# Patient Record
Sex: MALE | Race: WHITE | NOT HISPANIC OR LATINO | Employment: OTHER | URBAN - METROPOLITAN AREA
[De-identification: names, ages, dates, MRNs, and addresses within clinical notes are randomized per-mention and may not be internally consistent; named-entity substitution may affect disease eponyms.]

---

## 2017-10-31 ENCOUNTER — GENERIC CONVERSION - ENCOUNTER (OUTPATIENT)
Dept: OTHER | Facility: OTHER | Age: 77
End: 2017-10-31

## 2017-11-01 ENCOUNTER — APPOINTMENT (OUTPATIENT)
Dept: PHYSICAL THERAPY | Facility: REHABILITATION | Age: 77
End: 2017-11-01
Payer: COMMERCIAL

## 2017-11-01 PROCEDURE — 97110 THERAPEUTIC EXERCISES: CPT

## 2017-11-01 PROCEDURE — 97161 PT EVAL LOW COMPLEX 20 MIN: CPT

## 2017-11-01 PROCEDURE — 97140 MANUAL THERAPY 1/> REGIONS: CPT

## 2017-11-01 PROCEDURE — G8991 OTHER PT/OT GOAL STATUS: HCPCS

## 2017-11-01 PROCEDURE — G8990 OTHER PT/OT CURRENT STATUS: HCPCS

## 2017-11-07 ENCOUNTER — APPOINTMENT (OUTPATIENT)
Dept: PHYSICAL THERAPY | Facility: REHABILITATION | Age: 77
End: 2017-11-07
Payer: COMMERCIAL

## 2017-11-07 PROCEDURE — 97112 NEUROMUSCULAR REEDUCATION: CPT

## 2017-11-07 PROCEDURE — 97110 THERAPEUTIC EXERCISES: CPT

## 2017-11-08 ENCOUNTER — APPOINTMENT (OUTPATIENT)
Dept: PHYSICAL THERAPY | Facility: REHABILITATION | Age: 77
End: 2017-11-08
Payer: COMMERCIAL

## 2017-11-13 ENCOUNTER — APPOINTMENT (OUTPATIENT)
Dept: PHYSICAL THERAPY | Facility: REHABILITATION | Age: 77
End: 2017-11-13
Payer: COMMERCIAL

## 2017-11-13 PROCEDURE — 97110 THERAPEUTIC EXERCISES: CPT

## 2017-11-13 PROCEDURE — 97112 NEUROMUSCULAR REEDUCATION: CPT

## 2017-11-16 ENCOUNTER — APPOINTMENT (OUTPATIENT)
Dept: PHYSICAL THERAPY | Facility: REHABILITATION | Age: 77
End: 2017-11-16
Payer: COMMERCIAL

## 2017-11-16 PROCEDURE — 97140 MANUAL THERAPY 1/> REGIONS: CPT

## 2017-11-16 PROCEDURE — 97112 NEUROMUSCULAR REEDUCATION: CPT

## 2017-11-20 ENCOUNTER — APPOINTMENT (OUTPATIENT)
Dept: PHYSICAL THERAPY | Facility: REHABILITATION | Age: 77
End: 2017-11-20
Payer: COMMERCIAL

## 2017-11-20 PROCEDURE — 97110 THERAPEUTIC EXERCISES: CPT

## 2017-11-20 PROCEDURE — 97112 NEUROMUSCULAR REEDUCATION: CPT

## 2017-11-22 ENCOUNTER — APPOINTMENT (OUTPATIENT)
Dept: PHYSICAL THERAPY | Facility: REHABILITATION | Age: 77
End: 2017-11-22
Payer: COMMERCIAL

## 2017-11-22 PROCEDURE — 97112 NEUROMUSCULAR REEDUCATION: CPT

## 2017-11-22 PROCEDURE — 97110 THERAPEUTIC EXERCISES: CPT

## 2017-11-22 PROCEDURE — 97140 MANUAL THERAPY 1/> REGIONS: CPT

## 2017-11-28 ENCOUNTER — APPOINTMENT (OUTPATIENT)
Dept: PHYSICAL THERAPY | Facility: REHABILITATION | Age: 77
End: 2017-11-28
Payer: COMMERCIAL

## 2017-11-30 ENCOUNTER — APPOINTMENT (OUTPATIENT)
Dept: PHYSICAL THERAPY | Facility: REHABILITATION | Age: 77
End: 2017-11-30
Payer: COMMERCIAL

## 2017-11-30 PROCEDURE — 97112 NEUROMUSCULAR REEDUCATION: CPT

## 2017-11-30 PROCEDURE — 97140 MANUAL THERAPY 1/> REGIONS: CPT

## 2017-11-30 PROCEDURE — 97110 THERAPEUTIC EXERCISES: CPT

## 2017-12-07 ENCOUNTER — APPOINTMENT (OUTPATIENT)
Dept: PHYSICAL THERAPY | Facility: REHABILITATION | Age: 77
End: 2017-12-07
Payer: COMMERCIAL

## 2017-12-12 ENCOUNTER — APPOINTMENT (OUTPATIENT)
Dept: PHYSICAL THERAPY | Facility: REHABILITATION | Age: 77
End: 2017-12-12
Payer: COMMERCIAL

## 2017-12-12 PROCEDURE — 97140 MANUAL THERAPY 1/> REGIONS: CPT

## 2017-12-12 PROCEDURE — 97112 NEUROMUSCULAR REEDUCATION: CPT

## 2017-12-14 ENCOUNTER — APPOINTMENT (OUTPATIENT)
Dept: PHYSICAL THERAPY | Facility: REHABILITATION | Age: 77
End: 2017-12-14
Payer: COMMERCIAL

## 2017-12-14 PROCEDURE — 97112 NEUROMUSCULAR REEDUCATION: CPT

## 2017-12-14 PROCEDURE — 97110 THERAPEUTIC EXERCISES: CPT

## 2017-12-19 ENCOUNTER — APPOINTMENT (OUTPATIENT)
Dept: PHYSICAL THERAPY | Facility: REHABILITATION | Age: 77
End: 2017-12-19
Payer: COMMERCIAL

## 2017-12-19 PROCEDURE — 97110 THERAPEUTIC EXERCISES: CPT

## 2017-12-19 PROCEDURE — 97112 NEUROMUSCULAR REEDUCATION: CPT

## 2017-12-19 PROCEDURE — G8992 OTHER PT/OT  D/C STATUS: HCPCS

## 2017-12-19 PROCEDURE — G8990 OTHER PT/OT CURRENT STATUS: HCPCS

## 2017-12-19 PROCEDURE — G8991 OTHER PT/OT GOAL STATUS: HCPCS

## 2017-12-21 ENCOUNTER — APPOINTMENT (OUTPATIENT)
Dept: PHYSICAL THERAPY | Facility: REHABILITATION | Age: 77
End: 2017-12-21
Payer: COMMERCIAL

## 2018-01-22 VITALS
SYSTOLIC BLOOD PRESSURE: 164 MMHG | HEART RATE: 69 BPM | WEIGHT: 224.5 LBS | HEIGHT: 68 IN | DIASTOLIC BLOOD PRESSURE: 85 MMHG | BODY MASS INDEX: 34.02 KG/M2

## 2023-02-21 ENCOUNTER — OFFICE VISIT (OUTPATIENT)
Dept: PODIATRY | Facility: CLINIC | Age: 83
End: 2023-02-21

## 2023-02-21 VITALS
HEIGHT: 67 IN | WEIGHT: 217 LBS | SYSTOLIC BLOOD PRESSURE: 130 MMHG | BODY MASS INDEX: 34.06 KG/M2 | DIASTOLIC BLOOD PRESSURE: 78 MMHG | HEART RATE: 67 BPM

## 2023-02-21 DIAGNOSIS — M20.42 HAMMER TOE OF LEFT FOOT: ICD-10-CM

## 2023-02-21 DIAGNOSIS — M20.12 HALLUX VALGUS OF LEFT FOOT: Primary | ICD-10-CM

## 2023-02-21 NOTE — PROGRESS NOTES
Assessment/Plan:    I personally reviewed x-rays of the left foot  Severe hallux valgus deformity noted  Hammertoe deformity left second toe present  Metatarsal adductus noted  Degenerative changes of the midfoot present  Reviewed x-rays with patient and wife  Explained that surgery is not recommended due to his age and Parkinson's  Dispensed Silipos pads for the second toe  Recommended cutting toenails and old pairs of shoes both with the bunion hammertoe  Trimmed elongated toenails  Reappoint as needed    No problem-specific Assessment & Plan notes found for this encounter  Diagnoses and all orders for this visit:    Hallux valgus of left foot  -     X-ray foot left 3+ views; Future    Hammer toe of left foot  -     X-ray foot left 3+ views; Future          Subjective:      Patient ID: Petrona Martin is a 78 y o  male  HPI     Patient, an 78-year-old male presents with his wife  Patient complains of pain due to a severe bunion deformity affecting the left foot and a hammertoe deformity affecting the left second toe  Patient has a soft corn on the left second toe due to the bunion deformity  Patient having difficulty finding shoes to wear that are comfortable  Patient notes that he is suffering with Parkinson's syndrome and has poor balance  Wife mentions that he has early stage dementia  The following portions of the patient's history were reviewed and updated as appropriate: allergies, current medications, past family history, past medical history, past social history, past surgical history and problem list     Review of Systems   Musculoskeletal: Positive for gait problem  Neurological: Positive for tremors          Parkinson's   Psychiatric/Behavioral:        Dementia             Objective:      /78 (BP Location: Left arm, Patient Position: Sitting, Cuff Size: Adult)   Pulse 67   Ht 5' 7" (1 702 m)   Wt 98 4 kg (217 lb)   BMI 33 99 kg/m²          Physical Exam  Constitutional:       Appearance: Normal appearance  Cardiovascular:      Pulses: Normal pulses  Musculoskeletal:         General: Deformity present  Comments: Severe hallux valgus deformity left foot  Left second toe is being irritated by hallux which is laterally deviated  Hammertoe deformity left second toe  PIPJ is sensitive  Skin:     Findings: Lesion present  Comments: Soft corn medial aspect left second toe  Neurological:      General: No focal deficit present  Mental Status: He is oriented to person, place, and time  Statement Selected

## 2023-08-08 ENCOUNTER — ESTABLISHED COMPREHENSIVE EXAM (OUTPATIENT)
Dept: URBAN - METROPOLITAN AREA CLINIC 6 | Facility: CLINIC | Age: 83
End: 2023-08-08

## 2023-08-08 DIAGNOSIS — Z96.1: ICD-10-CM

## 2023-08-08 DIAGNOSIS — H04.123: ICD-10-CM

## 2023-08-08 PROCEDURE — 92015 DETERMINE REFRACTIVE STATE: CPT

## 2023-08-08 PROCEDURE — 92014 COMPRE OPH EXAM EST PT 1/>: CPT

## 2023-08-08 ASSESSMENT — KERATOMETRY
OS_AXISANGLE2_DEGREES: 175
OD_AXISANGLE2_DEGREES: 161
OD_AXISANGLE_DEGREES: 71
OS_K1POWER_DIOPTERS: 44.25
OS_K2POWER_DIOPTERS: 45.50
OD_K1POWER_DIOPTERS: 44.00
OD_K2POWER_DIOPTERS: 45.00
OS_AXISANGLE_DEGREES: 85

## 2023-08-08 ASSESSMENT — TONOMETRY
OS_IOP_MMHG: 12
OD_IOP_MMHG: 14

## 2023-08-08 ASSESSMENT — VISUAL ACUITY
OD_CC: 20/40-2
OS_CC: 20/30-1
OU_CC: J1

## 2024-03-25 ENCOUNTER — EVALUATION (OUTPATIENT)
Dept: PHYSICAL THERAPY | Facility: CLINIC | Age: 84
End: 2024-03-25
Payer: COMMERCIAL

## 2024-03-25 DIAGNOSIS — R26.89 BALANCE DISORDER: Primary | ICD-10-CM

## 2024-03-25 PROCEDURE — 97112 NEUROMUSCULAR REEDUCATION: CPT | Performed by: PHYSICAL THERAPIST

## 2024-03-25 PROCEDURE — 97163 PT EVAL HIGH COMPLEX 45 MIN: CPT | Performed by: PHYSICAL THERAPIST

## 2024-03-25 NOTE — PROGRESS NOTES
PT Evaluation     Today's date: 3/25/2024  Patient name: Robi Bello  : 1940  MRN: [id number]  Referring provider: No ref. provider found  Dx:   Encounter Diagnosis     ICD-10-CM    1. Balance disorder  R26.89                         Identified Functional Component Movements (5)  1. Sit to Stand  2.  3.  4.  5.    Identified Hierarchies (1-3)  1.  2.  3.                  Subjective Evaluation    History of Present Illness  Mechanism of injury: Pt diagnosed with PD 5 years ago. Experienced recent fall in 3/16/24 at home and visited ER. Pt tripped over wire and hit head. Pt originally had headache, but pain has subsided since the fall. Imaging was performed and was negative. Pt has received rehab in the last 6 months at [facility] as well as home health physical therapy with [facility]. Spouse reports pt fell 3x in one day and 8x in the same week in January and has been off balance since. Spouse recently had a hip replacement and is unable to help him as much as possible.       Memory           Not a recurrent problem   Quality of life: good    Patient Goals  Patient goals for therapy: increased strength, independence with ADLs/IADLs, return to sport/leisure activities, improved balance and increased motion  Patient goal: Walk without assistive device, drive if possible  Pain  No pain reported    Social Support  Steps to enter house: yes (2 stairs to enter house. No railing.)  2  Stairs in house: yes (Has full staircase, but no longer goes up to second floor.)   Lives in: multiple-level home (Walk in shower installed, has raised toilet seat, has bedroom on main floor)  Lives with: spouse    Employment status: not working (Retired from [employer])  Exercise history: 3 years ago was golfing, not very active at home      Diagnostic Tests  X-ray: normal  MRI studies: normal  CT scan: normal  Treatments  Previous treatment: medication  Discharged from (in last 30 days): home health care        Objective  Patient  Reported Functional Limitations: Walking without AD, stairs, navigating obstacles on the floor     Gait Assessment  03/25/24        6 Minute Walk Test (ft): Pre: HR 66  O2 99%  775 ft with RW  Post: HR 85 O2 98%        2 Minute Walk Test (ft): 225 ft with RW        Gait Speed (ft/s): 20 ft / 8 sec = 2.5 ft/s  0.76 m/s        TUG NT    TUG Score:   TUG Manual Score:   TUG Cognitive Score:        FGA (<22/30 increased fall risk) 14/30         Sit to Stand Assessment  5xSTS score (time): 14 sec   Use of B/L UE (pt did not stand up all the way)         Balance:         Modified Clinical Test of Sensory Interaction on Balance   NT    Firm surface:  EO  EC  FOAM surface  EO  EC Firm surface:  EO  EC  FOAM surface  EO  EC Firm surface:  EO  EC  FOAM surface  EO  EC Firm surface:  EO  EC  FOAM surface  EO  EC     Activities Specific Balance Confidence Scale (ABC) score: 17.5 %        Hand Function Assessment  Hole Peg Test Score: NT          Posture: rounded shoulders, forward head    Vision:  Smooth pursuits: Normal   Horizontal saccades: Normal, but slow speed   Vertical saccades: Normal, but slow speed     Coordination  Finger to Nose: dysmetric b/l,   Rapid forearm supination and pronation: normal at slow speed, unable at increased speed   Heel on Shin: difficulty on RLE, normal on LLE    Manual Muscle Testing - Hip Right  ROM Left ROM   Flexion 5  5      Manual Muscle Testing - Knee Right ROM Left ROM   Flexion 5  5    Extension 5  5      Manual Muscle Testing - Ankle Right ROM LEFT Left   Doriflexion 4  5    Plantarflexion 5  5      Gait Assessment:  Decreased b/l heel strike, Decreased b/l Step Height, Decreased b/l Step Length,    Decreased R Arm Swing, decreased Trunk ROT          Short Term Goal Expiration Date:(***)  Long Term Goal Expiration Date: ()  POC Expiration Date: (***)    Visit count: 1 of 10; PN due ***    POC expires Unit limit Auth Expiration date PT/OT/ST + Visit Limit?   *** N/A N/A BOMN                            Visit/Unit Tracking  AUTH Status:  Date 03/25             BOMN Used 1              Remaining                     Precautions History of falls,        Manuals                                        Neuro Re-Ed                                                                 Ther Ex                                                                        Ther Activity                        Gait Training                        Modalities

## 2024-03-25 NOTE — PROGRESS NOTES
PT Evaluation     Today's date: 3/25/2024  Patient name: Robi Bello  : 1940  MRN: [id number]  Referring provider: No ref. provider found  Dx:   Encounter Diagnosis     ICD-10-CM    1. Balance disorder  R26.89                      Assessment  Assessment details: Patient is a 84 y/o male who presents to outpatient PT with the diagnosis of Parkinson's Disease 5 years ago and a recent fall on 3/16/24. Patient demonstrates deficits in endurance, strength, balance, motor planning, and functional activities. Patient demonstrates co-morbidities of Meniere disease, migraines, and bilateral tremors. Upon evaluation, pt had decreased coordination bilaterally with finger to nose and alternating movement testing with increased difficulty on the R. Pt has strong LE strength according to MMT, but had decreased power and functional strength with 5xSTS testing. Pt is also at an increased fall risk due to his FGA score of 14/30 and gait speed of 0.76 m/s. In addition, pt has poor self-confidence associated with balance and is at an increased risk of falls due to his ABC score of 17.5 %. Pt and family were educated on all results of today's testing as well as importance of safety with ambulating with RW. Pt and family verbalized understanding. Pt would benefit from skilled PT isessions at 2 times per week for 8 weeks in order to maximize progress toward personal goals as established during the first week of therapy interventions.     Impairments: abnormal coordination, abnormal gait, abnormal movement, activity intolerance, impaired balance, impaired physical strength, lacks appropriate home exercise program, safety issue and poor posture   Barriers to therapy: Limited transportation, history of migraines and Meniere disease  Understanding of Dx/Px/POC: good   Prognosis: good    Goals  STG:  Patient will improve 6MWT by 200 ft with RW demonstrating significant improvements in endurance  w/in 4 weeks  Patient will be  able to perform STS without use of UE within 4 weeks to demonstrate improvements in functional strength and power.  Patient will be able to walk household distances (100ft or more) with SPC and supervision within 4 weeks to address his goal of walking more independently.   Patient will be independent with HEP within 4 weeks  LTG:  Patient will improve 6MWT by 400 ft demonstrating significant improvements in endurance  w/in 8 weeks  Patient will demonstrated improved LE strength and endurance by improved 5xSTS to 12s or less without the use of UE within 8 weeks  Patient will improve TUG to 12s or less indicating they are no longer at risk for increased falls 8 weeks  Patient will demonstrates significant improvements in dynamic balance by improving FGA score by 6 points or more within 8 weeks  Patient will improve gait speed to 1.0 m/s indicating they are no longer at higher risk for falls within 8 weeks    Plan  Patient would benefit from: skilled physical therapy  Referral necessary: No  Planned therapy interventions: home exercise program, graded motor, graded exercise, graded activity, gait training, flexibility, functional ROM exercises, community reintegration, ADL training, balance, motor coordination training, neuromuscular re-education, patient education, postural training, sensory integrative techniques, strengthening, stretching, therapeutic activities, therapeutic exercise, therapeutic training and transfer training  Frequency: 2x week  Duration in weeks: 8  Plan of Care beginning date: 3/25/2024  Plan of Care expiration date: 5/20/2024  Treatment plan discussed with: patient and family    Subjective Evaluation    History of Present Illness  Mechanism of injury: Pt diagnosed with PD 5 years ago. Experienced recent fall in 3/16/24 at home and visited ER. Pt tripped over wire and hit head. Pt originally had headache, but pain has subsided since the fall. Imaging was performed and was negative. Pt has  received rehab in the last 6 months at [facility] as well as home health physical therapy with [facility]. Spouse reports pt fell 3x in one day and 8x in the same week in January and has been off balance since. Spouse recently had a hip replacement and is unable to help him as much as possible. Pt reports memory is his most difficult impairment as of right now.        Not a recurrent problem   Quality of life: good    Patient Goals  Patient goals for therapy: increased strength, independence with ADLs/IADLs, return to sport/leisure activities, improved balance and increased motion  Patient goal: Walk without assistive device, drive if possible  Pain  No pain reported    Social Support  Steps to enter house: yes (2 stairs to enter house. No railing.)  2  Stairs in house: yes (Has full staircase, but no longer goes up to second floor.)   Lives in: multiple-level home (Walk in shower installed, has raised toilet seat, has bedroom on main floor)  Lives with: spouse    Employment status: not working (Retired from [employer])  Exercise history: 3 years ago was golfing, not very active at home    Diagnostic Tests  X-ray: normal  MRI studies: normal  CT scan: normal  Treatments  Previous treatment: medication  Discharged from (in last 30 days): home health care    Objective  Patient Reported Functional Limitations: Walking without AD, stairs, navigating obstacles on the floor     Gait Assessment  03/25/24        6 Minute Walk Test (ft): Pre: HR 66  O2 99%  775 ft with RW  Post: HR 85 O2 98%        2 Minute Walk Test (ft): 225 ft with RW        Gait Speed (ft/s): 20 ft / 8 sec = 2.5 ft/s  0.76 m/s        TUG NT    TUG Score:   TUG Manual Score:   TUG Cognitive Score:        FGA (<22/30 increased fall risk) 14/30         Sit to Stand Assessment  5xSTS score (time): 14 sec   Use of B/L UE (pt did not stand up all the way)         Balance:         Modified Clinical Test of Sensory Interaction on Balance   NT    Firm  surface:  EO  EC  FOAM surface  EO  EC Firm surface:  EO  EC  FOAM surface  EO  EC Firm surface:  EO  EC  FOAM surface  EO  EC Firm surface:  EO  EC  FOAM surface  EO  EC     Activities Specific Balance Confidence Scale (ABC) score: 17.5 %        Hand Function Assessment  Hole Peg Test Score: NT          Posture: rounded shoulders, forward head    Vision:  Smooth pursuits: Normal   Horizontal saccades: Normal, but slow speed   Vertical saccades: Normal, but slow speed     Coordination  Finger to Nose: dysmetric b/l,   Rapid forearm supination and pronation: normal at slow speed, unable at increased speed   Heel on Shin: difficulty on RLE, normal on LLE    Manual Muscle Testing - Hip Right  ROM Left ROM   Flexion 5  5      Manual Muscle Testing - Knee Right ROM Left ROM   Flexion 5  5    Extension 5  5      Manual Muscle Testing - Ankle Right ROM LEFT Left   Doriflexion 4  5    Plantarflexion 5  5      Gait Assessment:  Decreased b/l heel strike, Decreased b/l Step Height, Decreased b/l Step Length,    Decreased R Arm Swing, decreased Trunk ROT          Short Term Goal Expiration Date:(04/22/2024)  Long Term Goal Expiration Date: (05/20/2024)  POC Expiration Date: (05/20/2024)    Visit count: 1 of 10; PN due 04/22/2024    POC expires Unit limit Auth Expiration date PT/OT/ST + Visit Limit?   05/20/2024 N/A N/A BOMN                           Visit/Unit Tracking  AUTH Status:  Date 03/25             BOMN Used 1              Remaining                     Precautions History of falls,        Manuals                                        Neuro Re-Ed  Pt and family educated on results of exam findings. Educated on safety with RW.                                                               Ther Ex                                                                        Ther Activity                        Gait Training                        Modalities

## 2024-03-27 ENCOUNTER — APPOINTMENT (OUTPATIENT)
Dept: PHYSICAL THERAPY | Facility: CLINIC | Age: 84
End: 2024-03-27
Payer: COMMERCIAL

## 2024-03-28 ENCOUNTER — OFFICE VISIT (OUTPATIENT)
Dept: PHYSICAL THERAPY | Facility: CLINIC | Age: 84
End: 2024-03-28
Payer: COMMERCIAL

## 2024-03-28 DIAGNOSIS — G20.A1 PARKINSON'S DISEASE, UNSPECIFIED WHETHER DYSKINESIA PRESENT, UNSPECIFIED WHETHER MANIFESTATIONS FLUCTUATE: Primary | ICD-10-CM

## 2024-03-28 DIAGNOSIS — R26.89 BALANCE DISORDER: ICD-10-CM

## 2024-03-28 PROCEDURE — 97112 NEUROMUSCULAR REEDUCATION: CPT | Performed by: PHYSICAL THERAPIST

## 2024-03-28 PROCEDURE — 97150 GROUP THERAPEUTIC PROCEDURES: CPT | Performed by: PHYSICAL THERAPIST

## 2024-03-28 NOTE — PROGRESS NOTES
Daily Note     Today's date: 3/28/2024  Patient name: Robi Bello  : 1940  MRN: [id number]  Referring provider: Joaquim Dickerson MD  Dx:   Encounter Diagnosis     ICD-10-CM    1. Parkinson's disease, unspecified whether dyskinesia present, unspecified whether manifestations fluctuate  G20.A1       2. Balance disorder  R26.89           Start Time: 915  Stop Time:   Total time in clinic (min): 45 minutes    Subjective: Pt reports difficulty with memory.       Objective: See treatment diary below    TUG TUG Score: 26.75 sec (no AD)   TUG Cognitive Score: 28.41 sec (no AD), only able to perform cognitive tasks for first 10 sec      Modified Clinical Test of Sensory Interaction on Balance   Firm surface:  EO 30 sec   EC 30 sec   FOAM surface  EO 30 sec   EC 3 sec, 11 sec, 21 sec      Group ([id number]): 15 minutes   1:1 treatment ([id number]): 30 minutes     Assessment:   Pt tolerated treatment well this session. Upon further testing, pt remains at an increased fall risk at this time due to his TUG score. Performance decreased on TUG with addition of cognitive tasks due to memory deficits. Pt was able to maintain static balance on firm surfaces and on foam with EO. However, pt had significant difficulty maintaining balance on foam with EC which is consistent with decreased vestibular cueing for balance. Pt was challenged with dynamic balance interventions which he tolerated well. Pt was able to increase HR to up to 60% of Hrmax for good promotion of cardiovascular endurance and neuroplasticity. Pt had difficulty with reciprocal arm and leg movements due to decreased coordination. In addition, pt had significant difficulty with lateral stepping requiring max verbal cues for technique and UE support. Pt would benefit from further skilled PT sessions to address deficits in endurance, balance, strength, and overall safety needed to achieve his personal and functional goals. Pt would also benefit from  "potential speech therapy evaluation to address memory deficits. Plan to discuss with patient and spouse next session.       Plan: Continue per plan of care.      Short Term Goal Expiration Date:(04/22/2024)  Long Term Goal Expiration Date: (05/20/2024)  POC Expiration Date: (05/20/2024)    Visit count: 2 of 10; PN due 04/22/2024    POC expires Unit limit Auth Expiration date PT/OT/ST + Visit Limit?   05/20/2024 N/A N/A BOMN                           Visit/Unit Tracking  AUTH Status:  Date 03/25 03/28            BOMN Used 1 2             Remaining                   Manuals 03/25 03/28                                      Neuro Re-Ed  Pt and family educated on results of exam findings. Educated on safety with RW. Pt education on results of test findings.       Hurdles   Solo   1/4 track   No UE support   6\" hurdles   Fwd x 3 laps   HR 80   O2 98%    Lat x 2 laps   (Max verbal cues to stay on task)   HR 77   O2 97%      HKM  Solo   1/4 track   Reciprocal arm swing     X 4 laps       Dynamic balance   Lateral stepping   Solo   1/4 track   X 2 laps       STS  B/L UE   2 x 10   Mod verbal cues for technique   HR 74   O2 97%                              Ther Ex                                                                        Ther Activity                        Gait Training                        Modalities                                "

## 2024-04-02 ENCOUNTER — OFFICE VISIT (OUTPATIENT)
Dept: PHYSICAL THERAPY | Facility: CLINIC | Age: 84
End: 2024-04-02
Payer: COMMERCIAL

## 2024-04-02 DIAGNOSIS — G20.A1 PARKINSON'S DISEASE, UNSPECIFIED WHETHER DYSKINESIA PRESENT, UNSPECIFIED WHETHER MANIFESTATIONS FLUCTUATE: Primary | ICD-10-CM

## 2024-04-02 DIAGNOSIS — R26.89 BALANCE DISORDER: ICD-10-CM

## 2024-04-02 PROCEDURE — 97112 NEUROMUSCULAR REEDUCATION: CPT | Performed by: PHYSICAL THERAPIST

## 2024-04-02 PROCEDURE — 97116 GAIT TRAINING THERAPY: CPT | Performed by: PHYSICAL THERAPIST

## 2024-04-02 NOTE — PROGRESS NOTES
Daily Note     Today's date: 2024  Patient name: Robi Bello  : 1940  MRN: [id number]  Referring provider: Joaquim Dickerson MD  Dx:   Encounter Diagnosis     ICD-10-CM    1. Parkinson's disease, unspecified whether dyskinesia present, unspecified whether manifestations fluctuate  G20.A1       2. Balance disorder  R26.89           Start Time: 1100  Stop Time: 1145  Total time in clinic (min): 45 minutes    Subjective: Overall feels good today. Reports occasional feelings of lightheadedness from supine -> sit transfers or sit -> stand transfers. Continues to complain about memory.       Objective: See treatment diary below    BP:   Seated: 168/84 mmHg L arm seated, (2 min after) 142/82 mmHg L arm seated   Standin/78 mmHg L arm standing       Assessment:   Pt tolerated the treatment fairly well this session. Pt was able to perform treadmill walking, but required max verbal cues for big steps to control festination and shuffling gait. Attempted using [employer] for visual cue for big steps, but performance was inconsistent. Pt responded better to verbal cueing. Following treadmill walking, pt felt lightheaded. An orthostatic vital assessment was performed. Pt's BP was increased in sitting, but was able to recover quickly. BP remained stable in standing which was not indicative of orthostatic hypotension at this time. Pt educated to stay hydrated and to take time with positional changes to improve symptoms of possible orthostatic hypotension due to potential side effects of medications. Pt verbalized understanding. Pt did well with dynamic balance interventions such as marching and stair negotiation, but had the most difficulty with backwards walking. Performance may have been impaired due to memory deficits and difficulty staying on task. Responded well to verbal and visual cues for technique. Pt would benefit from further skilled PT sessions to address deficits in endurance, strength, balance,  "and overall safety needed for ADLs and community ambulation.       Plan: Continue per plan of care.      Short Term Goal Expiration Date:(04/22/2024)  Long Term Goal Expiration Date: (05/20/2024)  POC Expiration Date: (05/20/2024)    Visit count: 3 of 10; PN due 04/22/2024    POC expires Unit limit Auth Expiration date PT/OT/ST + Visit Limit?   05/20/2024 N/A N/A BOMN                           Visit/Unit Tracking  AUTH Status:  Date 03/25 03/28 04/02           BOMN Used 1 2 3            Remaining                 uthorized (3/25/2024-12/31/2024)  Visits Requested Visits Authorized Visits Completed Visits Scheduled   [phone] 5       Manuals 03/25 03/28 04/02                                     Neuro Re-Ed  Pt and family educated on results of exam findings. Educated on safety with RW. Pt education on results of test findings.  Pt education on taking time getting up from supine lying due to symptoms of orthostatic hypotension.      Hurdles   Solo   1/4 track   No UE support   6\" hurdles   Fwd x 3 laps   HR 80   O2 98%    Lat x 2 laps   (Max verbal cues to stay on task)   HR 77   O2 97%      HKM  Solo   1/4 track   Reciprocal arm swing     X 4 laps  Solo   1/4 track   X 4 laps   HR 73  O2 99%    Visual and verbal cues to lift knee to walker     Dynamic balance   Lateral stepping   Solo   1/4 track   X 2 laps  Backwards walking   // bars   X 1 lap     Max verbal cues for technique      STS  B/L UE   2 x 10   Mod verbal cues for technique   HR 74   O2 97%      Stairs    Solo  Clinic staircase   Reciprocal pattern on ascent   Step-to pattern on descent   X 3 laps     Mod verbal cues for technique     Needed to remove glasses due to bifocals throwing balance off                      Ther Ex                                                                        Ther Activity                        Gait Training        Treadmill    Solo   1.2 mph   0% incline   10 minutes   HR 84  O2 99%    Physioball for visual cue for big " stepping   Max verbal cues for big steps              Modalities

## 2024-04-04 ENCOUNTER — OFFICE VISIT (OUTPATIENT)
Dept: PHYSICAL THERAPY | Facility: CLINIC | Age: 84
End: 2024-04-04
Payer: COMMERCIAL

## 2024-04-04 DIAGNOSIS — G20.A1 PARKINSON'S DISEASE, UNSPECIFIED WHETHER DYSKINESIA PRESENT, UNSPECIFIED WHETHER MANIFESTATIONS FLUCTUATE: Primary | ICD-10-CM

## 2024-04-04 DIAGNOSIS — R26.89 BALANCE DISORDER: ICD-10-CM

## 2024-04-04 PROCEDURE — 97112 NEUROMUSCULAR REEDUCATION: CPT | Performed by: PHYSICAL THERAPIST

## 2024-04-04 NOTE — PROGRESS NOTES
Daily Note     Today's date: 2024  Patient name: Robi Bello  : 1940  MRN: [id number]  Referring provider: Joaquim Dickerson MD  Dx:   Encounter Diagnosis     ICD-10-CM    1. Parkinson's disease, unspecified whether dyskinesia present, unspecified whether manifestations fluctuate  G20.A1       2. Balance disorder  R26.89           Start Time: 1100  Stop Time: 1145  Total time in clinic (min): 45 minutes    Subjective: Pt reports no new complaints at the start of the session. Pt shared that he enjoys golfing, but hasn't been able to do it in years because of his PD. At the end of the session, pt reported that he almost fell in the bathroom twice when getting up from the toilet.       Objective: See treatment diary below      Assessment:   Pt tolerated treatment well. Pt attempted the recumbent bike this session to promote cardiovascular endurance and neural priming, but had significant difficulty with maintaining feet on pedals requiring mod-max assistance for foot placement by therapist. Increased external cueing for big movements were utilized this session such as circles on the floor for increased step length and height of walker for marching. Pt responded well, but continues to require increased verbal cues for continued performance. Pt demonstrated improvements with STS this session and was encouraged to be loud with counting and raise arms overhead to encourage trunk extension and big movements. At the end of the session, pt needed to go to the bathroom. Pt asked for SPT to give him privacy and leave the restroom. SPT educated him on using grab bars to improve safety with toilet transfer prior to leaving. Pt then reported he almost fell twice while alone in the bathroom. Plan to ask wife about safety with toileting at home next session and plan to guard pt in bathroom during future sessions until he demonstrates safer transfers. Pt would benefit from further skilled PT sessions to address  "deficits in endurance, strength, balance, and overall safety needed for ADLs and community ambulation.     Plan: Continue per plan of care.      Short Term Goal Expiration Date:(04/22/2024)  Long Term Goal Expiration Date: (05/20/2024)  POC Expiration Date: (05/20/2024)    Visit count: 4 of 10; PN due 04/22/2024    POC expires Unit limit Auth Expiration date PT/OT/ST + Visit Limit?   05/20/2024 N/A N/A BOMN                           Visit/Unit Tracking  AUTH Status:  Date 03/25 03/28 04/02 04/04          BOMN Used 1 2 3 4           Remaining                 uthorized (3/25/2024-12/31/2024)  Visits Requested Visits Authorized Visits Completed Visits Scheduled   [phone] 5       Manuals 03/25 03/28 04/02 04/04                                    Neuro Re-Ed  Pt and family educated on results of exam findings. Educated on safety with RW. Pt education on results of test findings.  Pt education on taking time getting up from supine lying due to symptoms of orthostatic hypotension.  Pt education on safety with toilet transfers.     Pt education on benefits of big movements and loud voice to have a more \"normal\" movement pattern and voice projection.     Hurdles   Solo   1/4 track   No UE support   6\" hurdles   Fwd x 3 laps   HR 80   O2 98%    Lat x 2 laps   (Max verbal cues to stay on task)   HR 77   O2 97%      HKM  Solo   1/4 track   Reciprocal arm swing     X 4 laps  Solo   1/4 track   X 4 laps   HR 73  O2 99%    Visual and verbal cues to lift knee to walker Solo   1/4 track   X 4 laps   HR 88  O2 98%    Visual and verbal cues to lift knee to walker    Dynamic balance   Lateral stepping   Solo   1/4 track   X 2 laps  Backwards walking   // bars   X 1 lap     Max verbal cues for technique  Big stepping with [place] targets on floor for external cue  X 4 laps   Solo   1/4 track     STS  B/L UE   2 x 10   Mod verbal cues for technique   HR 74   O2 97%  B/L UE   2 x 10   W/ above head arm raise for high five   HR 82, 100  O2 " 98%, 99%    Emphasis on loud counting and big movements     Stairs    Solo  Clinic staircase   Reciprocal pattern on ascent   Step-to pattern on descent   X 3 laps     Mod verbal cues for technique     Needed to remove glasses due to bifocals throwing balance off      Boxing     Reciprocal punches (cross body to encourage trunk rotation)   Solo  2 x 2 minute rounds   HR 82   O2 98%    Mod verbal cues for technique             Ther Ex        Bike    Recumbent bike   L1 x 6 minutes     Mod-max A for foot placement on pedals   Verbal cues to maintain foot on pedals                                                             Ther Activity                        Gait Training        Treadmill    Solo   1.2 mph   0% incline   10 minutes   HR 84  O2 99%    Physioball for visual cue for big stepping   Max verbal cues for big steps              Modalities

## 2024-04-05 ENCOUNTER — APPOINTMENT (OUTPATIENT)
Dept: SPEECH THERAPY | Facility: CLINIC | Age: 84
End: 2024-04-05
Payer: COMMERCIAL

## 2024-04-05 ENCOUNTER — APPOINTMENT (OUTPATIENT)
Dept: PHYSICAL THERAPY | Facility: CLINIC | Age: 84
End: 2024-04-05
Payer: COMMERCIAL

## 2024-04-08 ENCOUNTER — APPOINTMENT (OUTPATIENT)
Dept: PHYSICAL THERAPY | Facility: CLINIC | Age: 84
End: 2024-04-08
Payer: COMMERCIAL

## 2024-04-08 ENCOUNTER — APPOINTMENT (OUTPATIENT)
Dept: SPEECH THERAPY | Facility: CLINIC | Age: 84
End: 2024-04-08
Payer: COMMERCIAL

## 2024-04-09 ENCOUNTER — OFFICE VISIT (OUTPATIENT)
Dept: PHYSICAL THERAPY | Facility: CLINIC | Age: 84
End: 2024-04-09
Payer: COMMERCIAL

## 2024-04-09 DIAGNOSIS — G20.A1 PARKINSON'S DISEASE, UNSPECIFIED WHETHER DYSKINESIA PRESENT, UNSPECIFIED WHETHER MANIFESTATIONS FLUCTUATE: Primary | ICD-10-CM

## 2024-04-09 DIAGNOSIS — R26.89 BALANCE DISORDER: ICD-10-CM

## 2024-04-09 PROCEDURE — 97116 GAIT TRAINING THERAPY: CPT | Performed by: PHYSICAL THERAPIST

## 2024-04-09 PROCEDURE — 97112 NEUROMUSCULAR REEDUCATION: CPT | Performed by: PHYSICAL THERAPIST

## 2024-04-09 NOTE — PROGRESS NOTES
Daily Note     Today's date: 2024  Patient name: Robi Bello  : 1940  MRN: [id number]  Referring provider: Joaquim Dickerson MD  Dx: No diagnosis found.               Subjective: Has been having trouble with rolling in bed because he feels stiff. Also complains of R LBP with bed mobility that feels like it's sharp and aching.       Objective: See treatment diary below    Transfers    Sit To Stand Independent, use of b/l UE    Supine to sit  Mod Assist   Sit To Supine Independent   Roll Independent, more difficulty to the L      Assessment:   Pt tolerated the treatment well this session. Bed mobility was assessed this session due to pt's subjective reports of difficulty at home. Pt is able to perform sit <> supine transfer and rolling independently, but is slow with all movements. Pt has more difficulty rolling to the L than the R and requires modAx1 to perform sidelying <> sit transfer due to truncal rigidity and LBP. Pt performed trunk rotations and floor to ceiling daily maximal exercise to encourage trunk rotation and flexibility. Pt demonstrated good technique, but required max verbal and visual cues to perform exercises. Pt was provided with written handout of ceiling to floor daily maximal exercise for HEP. Pt verbalized understanding. Pt demonstrated improvements with treadmill walking this session by increasing step height and length with verbal cues and an external cue of tapping where the pt needed to step. Pt demonstrated increased fatigue post-session. Pt would benefit from further skilled PT sessions to address deficits in flexibility, endurance, balance, strength, and overall safety needed for ADLs and community navigation.       Plan: Continue per plan of care.      Short Term Goal Expiration Date:(2024)  Long Term Goal Expiration Date: (2024)  POC Expiration Date: (2024)    Visit count: 4 of 10; PN due 2024    POC expires Unit limit Auth Expiration date  "PT/OT/ST + Visit Limit?   05/20/2024 N/A N/A BOMN                           Visit/Unit Tracking  AUTH Status:  Date 03/25 03/28 04/02 04/04 04/09         BOMN Used 1 2 3 4 5          Remaining                 uthorized (3/25/2024-12/31/2024)  Visits Requested Visits Authorized Visits Completed Visits Scheduled   99 [phone] 5       Manuals 03/25 03/28 04/02 04/04 04/09                                   Neuro Re-Ed  Pt and family educated on results of exam findings. Educated on safety with RW. Pt education on results of test findings.  Pt education on taking time getting up from supine lying due to symptoms of orthostatic hypotension.  Pt education on safety with toilet transfers.     Pt education on benefits of big movements and loud voice to have a more \"normal\" movement pattern and voice projection.  Pt education of benefits of daily maximal exercises to improve ROM and mobility with Parkinson's disease.     Pt education on breathing during exercise.    Hurdles   Solo   1/4 track   No UE support   6\" hurdles   Fwd x 3 laps   HR 80   O2 98%    Lat x 2 laps   (Max verbal cues to stay on task)   HR 77   O2 97%      HKM  Solo   1/4 track   Reciprocal arm swing     X 4 laps  Solo   1/4 track   X 4 laps   HR 73  O2 99%    Visual and verbal cues to lift knee to walker Solo   1/4 track   X 4 laps   HR 88  O2 98%    Visual and verbal cues to lift knee to walker    Dynamic balance   Lateral stepping   Solo   1/4 track   X 2 laps  Backwards walking   // bars   X 1 lap     Max verbal cues for technique  Big stepping with [place] targets on floor for external cue  X 4 laps   Solo   1/4 track     STS  B/L UE   2 x 10   Mod verbal cues for technique   HR 74   O2 97%  B/L UE   2 x 10   W/ above head arm raise for high five   HR 82, 100  O2 98%, 99%    Emphasis on loud counting and big movements     Stairs    [facility] staircase   Reciprocal pattern on ascent   Step-to pattern on descent   X 3 laps     Mod verbal cues for technique "     Needed to remove glasses due to bifocals throwing balance off      Boxing     Reciprocal punches (cross body to encourage trunk rotation)   Solo  2 x 2 minute rounds   HR 82   O2 98%    Mod verbal cues for technique     Daily maximal exercises      Floor to ceiling   X 20   Seated    Max verbal and visual cues for technique    Review for HEP   Diaphragmatic breathing      B/L UE D2 flexion with diaphragmatic breathing  Seated   X 10   (Encourage upright posture and breathing during exercises)   Bed mobility      Sit <> supine transfers   Rolling b/l   W/ supervision     Supine <> sit (mod Ax1 and max verbal cues for technique)    Open books      X 10 each side  (Encourage trunk rotation and decrease truncal rigidity due to PD )      Max verbal cues for technique    Ther Ex        Bike    Recumbent bike   L1 x 6 minutes     Mod-max A for foot placement on pedals   Verbal cues to maintain foot on pedals                                                             Ther Activity                        Gait Training        Treadmill    Solo   1.2 mph   0% incline   10 minutes   HR 84  O2 99%    Physioball for visual cue for big stepping   Max verbal cues for big steps   Solo   0.8 mph   0% incline   10 minutes   HR 88  O2 99%    Mod verbal cues for big stepping and foot clearance.   Mod visual cue for big stepping by tapping spot on the treadmill to step           Modalities

## 2024-04-11 ENCOUNTER — APPOINTMENT (OUTPATIENT)
Dept: PHYSICAL THERAPY | Facility: CLINIC | Age: 84
End: 2024-04-11
Payer: COMMERCIAL

## 2024-04-11 ENCOUNTER — OFFICE VISIT (OUTPATIENT)
Dept: PHYSICAL THERAPY | Facility: CLINIC | Age: 84
End: 2024-04-11
Payer: COMMERCIAL

## 2024-04-11 ENCOUNTER — APPOINTMENT (OUTPATIENT)
Dept: SPEECH THERAPY | Facility: CLINIC | Age: 84
End: 2024-04-11
Payer: COMMERCIAL

## 2024-04-11 DIAGNOSIS — R26.89 BALANCE DISORDER: ICD-10-CM

## 2024-04-11 DIAGNOSIS — G20.A1 PARKINSON'S DISEASE, UNSPECIFIED WHETHER DYSKINESIA PRESENT, UNSPECIFIED WHETHER MANIFESTATIONS FLUCTUATE: Primary | ICD-10-CM

## 2024-04-11 PROCEDURE — 97530 THERAPEUTIC ACTIVITIES: CPT | Performed by: PHYSICAL THERAPIST

## 2024-04-11 PROCEDURE — 97112 NEUROMUSCULAR REEDUCATION: CPT | Performed by: PHYSICAL THERAPIST

## 2024-04-11 NOTE — PROGRESS NOTES
Daily Note     Today's date: 2024  Patient name: Robi Bello  : 1940  MRN: [id number]  Referring provider: Joaquim Dickerson MD  Dx:   Encounter Diagnosis     ICD-10-CM    1. Parkinson's disease, unspecified whether dyskinesia present, unspecified whether manifestations fluctuate  G20.A1       2. Balance disorder  R26.89           Start Time: 1215  Stop Time: 1300  Total time in clinic (min): 45 minutes    Subjective: Didn't complete HEP provided at last session notes he forget about it shortly after session due to difficulty with memory.       Objective: See treatment diary below      Assessment: Tolerated treatment well. Patient demonstrated fatigue post treatment and would benefit from continued PT. Session focused on trunk mobility; flexion/extension and rotation  to assist with bed mobility.   Cues to breath and avoid holding his breath with extending out COG. Requires reminders cues and demo to stay on task and for proper technique.   Requires constant cueing and min ax1 75% of the time with all bed mobility. Slight back pain reported after bed mobility. Decreased after a few minutes in upright posture. Patient demonstrated freezing with obstacles in path navigating throughout gym, required cues and HHA to navigate. Demonstrated increased step height and length with cueing.        Plan: Continue per plan of care.      Short Term Goal Expiration Date:(2024)  Long Term Goal Expiration Date: (2024)  POC Expiration Date: (2024)    Visit count: 4 of 10; PN due 2024    POC expires Unit limit Auth Expiration date PT/OT/ST + Visit Limit?   2024 N/A N/A BOMN                           Visit/Unit Tracking  AUTH Status:  Date         BOMN Used 1 2 3 4 5 6         Remaining                 Authorized (3/25/2024-2024)  Visits Requested Visits Authorized Visits Completed Visits Scheduled   99 [phone] 2   Details      Manuals   "04/04 04/09                                   Neuro Re-Ed    Pt education on taking time getting up from supine lying due to symptoms of orthostatic hypotension.  Pt education on safety with toilet transfers.     Pt education on benefits of big movements and loud voice to have a more \"normal\" movement pattern and voice projection.  Pt education of benefits of daily maximal exercises to improve ROM and mobility with Parkinson's disease.     Pt education on breathing during exercise.    Hurdles         HKM   Solo   1/4 track   X 4 laps   HR 73  O2 99%    Visual and verbal cues to lift knee to walker Solo   1/4 track   X 4 laps   HR 88  O2 98%    Visual and verbal cues to lift knee to walker    Dynamic balance    Backwards walking   // bars   X 1 lap     Max verbal cues for technique  Big stepping with [place] targets on floor for external cue  X 4 laps   Solo   1/4 track     STS    B/L UE   2 x 10   W/ above head arm raise for high five   HR 82, 100  O2 98%, 99%    Emphasis on loud counting and big movements     Stairs    Solo  Clinic staircase   Reciprocal pattern on ascent   Step-to pattern on descent   X 3 laps     Mod verbal cues for technique     Needed to remove glasses due to bifocals throwing balance off      Boxing     Reciprocal punches (cross body to encourage trunk rotation)   Solo  2 x 2 minute rounds   HR 82   O2 98%    Mod verbal cues for technique     Daily maximal exercises  Floor to ceiling with demo and max cues for increase excursion     Side to side rotation with external cues placing clips on vertical stick promoting increase trunk rotation and uE flexion out side of comfort zone.     X 20 each direction     Floor to ceiling   X 20   Seated    Max verbal and visual cues for technique    Review for HEP   Diaphragmatic breathing      B/L UE D2 flexion with diaphragmatic breathing  Seated   X 10   (Encourage upright posture and breathing during exercises)   Bed mobility      Sit <> supine transfers "   Rolling b/l   W/ supervision     Supine <> sit (mod Ax1 and max verbal cues for technique)    Open books      X 10 each side  (Encourage trunk rotation and decrease truncal rigidity due to PD )      Max verbal cues for technique    Ther Ex        Bike    Recumbent bike   L1 x 6 minutes     Mod-max A for foot placement on pedals   Verbal cues to maintain foot on pedals                                                             Ther Activity        Bed mobility  Rolling x 5   With min a x 1     Supine to sit x3  W/ min a x 1                Gait Training        Treadmill  Resume NV  Did not complete due to time constraint  Solo   1.2 mph   0% incline   10 minutes   HR 84  O2 99%    Physioball for visual cue for big stepping   Max verbal cues for big steps   Solo   0.8 mph   0% incline   10 minutes   HR 88  O2 99%    Mod verbal cues for big stepping and foot clearance.   Mod visual cue for big stepping by tapping spot on the treadmill to step   Level surface  Busy environment in and out of clinic  No AD    CG x 150ft     HHA 150ft cues for navigating obstacles             Modalities

## 2024-04-16 ENCOUNTER — OFFICE VISIT (OUTPATIENT)
Dept: PHYSICAL THERAPY | Facility: CLINIC | Age: 84
End: 2024-04-16
Payer: COMMERCIAL

## 2024-04-16 ENCOUNTER — EVALUATION (OUTPATIENT)
Dept: SPEECH THERAPY | Facility: CLINIC | Age: 84
End: 2024-04-16
Payer: COMMERCIAL

## 2024-04-16 DIAGNOSIS — G20.A1 MODERATE DEMENTIA DUE TO PARKINSON'S DISEASE, UNSPECIFIED WHETHER BEHAVIORAL, PSYCHOTIC, OR MOOD DISTURBANCE OR ANXIETY (HCC): ICD-10-CM

## 2024-04-16 DIAGNOSIS — R48.8 OTHER SYMBOLIC DYSFUNCTIONS: Primary | ICD-10-CM

## 2024-04-16 DIAGNOSIS — G20.A1 PARKINSON'S DISEASE, UNSPECIFIED WHETHER DYSKINESIA PRESENT, UNSPECIFIED WHETHER MANIFESTATIONS FLUCTUATE: Primary | ICD-10-CM

## 2024-04-16 DIAGNOSIS — R26.89 BALANCE DISORDER: ICD-10-CM

## 2024-04-16 DIAGNOSIS — F02.B0 MODERATE DEMENTIA DUE TO PARKINSON'S DISEASE, UNSPECIFIED WHETHER BEHAVIORAL, PSYCHOTIC, OR MOOD DISTURBANCE OR ANXIETY (HCC): ICD-10-CM

## 2024-04-16 PROCEDURE — 97116 GAIT TRAINING THERAPY: CPT | Performed by: PHYSICAL THERAPIST

## 2024-04-16 PROCEDURE — 97112 NEUROMUSCULAR REEDUCATION: CPT | Performed by: PHYSICAL THERAPIST

## 2024-04-16 PROCEDURE — 96125 COGNITIVE TEST BY HC PRO: CPT

## 2024-04-16 RX ORDER — MEMANTINE HYDROCHLORIDE 10 MG/1
10 TABLET ORAL 2 TIMES DAILY
COMMUNITY

## 2024-04-16 NOTE — LETTER
2024    Joaquim Dickerson MD  [address] [place] [id number]    Patient: Robi Bello   YOB: 1940   Date of Visit: 2024     Encounter Diagnosis     ICD-10-CM    1. Other symbolic dysfunctions  R48.8       2. Moderate dementia due to Parkinson's disease, unspecified whether behavioral, psychotic, or mood disturbance or anxiety (HCC)  G20.A1     F02.B0           Dear Dr. Dickerson:    Thank you for your recent referral of Robi Bello. Please review the attached evaluation summary from Robi's recent visit.     Please verify that you agree with the plan of care by signing the attached order.     If you have any questions or concerns, please do not hesitate to call.     I sincerely appreciate the opportunity to share in the care of one of your patients and hope to have another opportunity to work with you in the near future.     Sincerely,    Nori Johnson, SLP      Referring Provider:     Based upon review of the patient's progress and continued therapy plan, it is my medical opinion that Robi Bello should continue speech therapy treatment at the Physical Therapy at [address]:                    Joaquim Dickerson MD  [address] [place] [id number]  Via Fax: [phone]        Speech-Language Pathology Initial Evaluation    Today's date: 2024   Patient’s name: Robi eBllo  : 1940  MRN: [id number]  Safety measures: PD, Fall Risk  Referring provider: Joaquim Dickerson MD    Encounter Diagnosis     ICD-10-CM    1. Other symbolic dysfunctions  R48.8       2. Moderate dementia due to Parkinson's disease, unspecified whether behavioral, psychotic, or mood disturbance or anxiety (HCC)  G20.A1     F02.B0           Assessment: Patient presents with moderate to severe cognitive-linguistic deficits characterized by decreased auditory immediate memory, auditory and visual attention, and word finding secondary to Dementia from PD.  During  testing today, Patient demonstrated great strengths with temporal and spatial orientation in regards to his demographic information.  Patient was also noted to have strengths with confrontational naming.  Patient demonstrated weaknesses with visual attention (e.g. symbol cancellation, symbol trails, mazes, and design generation), auditory memory (e.g. story retelling), and thought organization (e.g. clock drawing, symbol trails, mazes, design generation).  Patient was noted to ask for clarification on tasks throughout task.  Clinician continued to remind him that the information he needed to complete tasks was often found on the page in front of him.  Patient did recognize when he made errors, but was unable to correct them himself.  Based on testing results today and nature of Patient's diagnosis, it is recommended that he be put on a maintenance program.  Patient will be seen 1X a week to target the goals mentioned below to maintain his current level of functioning and attempt to prevent rapid regression.        Short Term Goals  1. Patient will complete word generation tasks (e.g., categorical naming, sentence/phrase completion, word deduction, definitions, etc.) at 75% accuracy with min semantic and phonemic cues from clinician as needed to target the maintenance of his/her expressive vocabulary, as well as promote socialization and safety.    2. Patient will complete cognitive-linguistic activities (e.g., verbal and visual problem solving scenarios, sequencing, reasoning, etc.) at 75% accuracy with min semantic cues from clinician as needed to target the maintenance of his/her cognitive-linguistic functioning, as well as promote safety and overall QOL.    Long Term Goals  1. Patient will maintain his/her highest level of independence with her current cognitive-linguistic functioning to meet her needs with the implementation of compensatory strategies to promote positive communication interactions and  socialization, participation in meaningful activities, safety, and quality of life (to be achieved by discharge).    2. Patient will complete cognitive-linguistic therapy that addresses patient’s specific deficits in processing speed, short-term working memory, attention to detail, monitoring, sequencing, and organization skills, with instruction, to alleviate effects of executive functioning disorder deficits by discharge.      Plan  Patient would benefit from outpatient skilled Speech Therapy services: Cognitive-linguistic therapy and Maintenance therapy program    Frequency: 1x weekly  Duration:  8-10 Weeks    Intervention certification from: 4/16/2024  Intervention certification to: 06/26/2024      Subjective  History of present illness: Patient is a 84 y.o. male who was referred to outpatient skilled Speech Therapy services for a cognitive-linguistic evaluation. Patient was diagnosed with PD approx. 5 years ago.  Since then, he has demonstrated difficulty with cognition.  Patient denied any changes or difficulty with swallowing and voicing at this time.  Patient's wife reports that he has the most trouble with memory.  Patient does have h/o hearing loss which is currently untreated.  This could be contributing to his trouble with memory as well.  Patient is currently taking 10 mg of Namenda.      Patient's goal(s): I am not sure.     Hearing: Decreased in R ear   Vision: WFL for testing (with glasses)     Home environment/lifestyle: At home with his wifeTresa  Highest level of education: High school  Vocational status: Retired ( for [employer])       Objective (testing)  The Cognitive Linguistic Quick Test (CLQT) is designed to measure an individual’s five cognitive domains (attention, memory, executive functions, language, and visuospatial skills). This norm-referenced tool has been standardized on adults ages 18 through 89 years old with neurological impairment as a result of CVA, TBI, or  dementia. The following results were obtained during the administration of the assessment.    Cognitive Domain: Score: Range of Severity:   Attention 31/215 Severe   Memory 104/185 Moderate   Executive Functions 3/40 Severe   Language  21/37 Moderate   Visuospatial Skills  22/105 Moderate        *Composite Severity Ratin.6/4.0  Moderate             Clock Drawin/13 Severe     Patient scored below Criterion Cut Scores on the following subtests: Symbol Cancellation, Clock Drawing, Story Retelling, Symbol Trails, Generative Naming, Mazes, and Design Generation.      *Patient named 3 concrete category members (animals) in 60 sec (norm=15+). -- BELOW AVERAGE    *Patient named 3 abstract category members (words beginning with letter 'm') in 60 sec (norm=10+). -- BELOW AVERAGE        Treatment  No treatment was performed on this date of service.      Visit tracking:  Re-eval Due POC Expires Auth Expiration Date ST Visit Limit   2024 BOMN                           Visit/Unit Tracking  AUTH Status:  Date 2024   BOMN Used 1    Remaining  BOMN       Intervention comments:  45 minutes test administration; 45 minutes scoring and documentation of POC

## 2024-04-16 NOTE — PROGRESS NOTES
Speech-Language Pathology Initial Evaluation    Today's date: 2024   Patient’s name: Robi Bello  : 1940  MRN: [id number]  Safety measures: PD, Fall Risk  Referring provider: Joaquim Dickerson MD    Encounter Diagnosis     ICD-10-CM    1. Other symbolic dysfunctions  R48.8       2. Moderate dementia due to Parkinson's disease, unspecified whether behavioral, psychotic, or mood disturbance or anxiety ([facility])  G20.A1     F02.B0           Assessment: Patient presents with moderate to severe cognitive-linguistic deficits characterized by decreased auditory immediate memory, auditory and visual attention, and word finding secondary to Dementia from PD.  During testing today, Patient demonstrated great strengths with temporal and spatial orientation in regards to his demographic information.  Patient was also noted to have strengths with confrontational naming.  Patient demonstrated weaknesses with visual attention (e.g. symbol cancellation, symbol trails, mazes, and design generation), auditory memory (e.g. story retelling), and thought organization (e.g. clock drawing, symbol trails, mazes, design generation).  Patient was noted to ask for clarification on tasks throughout task.  Clinician continued to remind him that the information he needed to complete tasks was often found on the page in front of him.  Patient did recognize when he made errors, but was unable to correct them himself.  Based on testing results today and nature of Patient's diagnosis, it is recommended that he be put on a maintenance program.  Patient will be seen 1X a week to target the goals mentioned below to maintain his current level of functioning and attempt to prevent rapid regression.        Short Term Goals  1. Patient will complete word generation tasks (e.g., categorical naming, sentence/phrase completion, word deduction, definitions, etc.) at 75% accuracy with min semantic and phonemic cues from clinician as needed to  target the maintenance of his/her expressive vocabulary, as well as promote socialization and safety.    2. Patient will complete cognitive-linguistic activities (e.g., verbal and visual problem solving scenarios, sequencing, reasoning, etc.) at 75% accuracy with min semantic cues from clinician as needed to target the maintenance of his/her cognitive-linguistic functioning, as well as promote safety and overall QOL.    Long Term Goals  1. Patient will maintain his/her highest level of independence with her current cognitive-linguistic functioning to meet her needs with the implementation of compensatory strategies to promote positive communication interactions and socialization, participation in meaningful activities, safety, and quality of life (to be achieved by discharge).    2. Patient will complete cognitive-linguistic therapy that addresses patient’s specific deficits in processing speed, short-term working memory, attention to detail, monitoring, sequencing, and organization skills, with instruction, to alleviate effects of executive functioning disorder deficits by discharge.      Plan  Patient would benefit from outpatient skilled Speech Therapy services: Cognitive-linguistic therapy and Maintenance therapy program    Frequency: 1x weekly  Duration:  8-10 Weeks    Intervention certification from: 4/16/2024  Intervention certification to: 06/26/2024      Subjective  History of present illness: Patient is a 84 y.o. male who was referred to outpatient skilled Speech Therapy services for a cognitive-linguistic evaluation. Patient was diagnosed with PD approx. 5 years ago.  Since then, he has demonstrated difficulty with cognition.  Patient denied any changes or difficulty with swallowing and voicing at this time.  Patient's wife reports that he has the most trouble with memory.  Patient does have h/o hearing loss which is currently untreated.  This could be contributing to his trouble with memory as well.   Patient is currently taking 10 mg of Namenda.      Patient's goal(s): I am not sure.     Hearing: Decreased in R ear   Vision: WFL for testing (with glasses)     Home environment/lifestyle: At home with his wife, Tresa  Highest level of education: High school  Vocational status: Retired ( for [employer])       Objective (testing)  The Cognitive Linguistic Quick Test (CLQT) is designed to measure an individual’s five cognitive domains (attention, memory, executive functions, language, and visuospatial skills). This norm-referenced tool has been standardized on adults ages 18 through 89 years old with neurological impairment as a result of CVA, TBI, or dementia. The following results were obtained during the administration of the assessment.    Cognitive Domain: Score: Range of Severity:   Attention 31/215 Severe   Memory 104/185 Moderate   Executive Functions 3/40 Severe   Language  21/37 Moderate   Visuospatial Skills  22/105 Moderate        *Composite Severity Ratin.6/4.0  Moderate             Clock Drawin/13 Severe     Patient scored below Criterion Cut Scores on the following subtests: Symbol Cancellation, Clock Drawing, Story Retelling, Symbol Trails, Generative Naming, Mazes, and Design Generation.      *Patient named 3 concrete category members (animals) in 60 sec (norm=15+). -- BELOW AVERAGE    *Patient named 3 abstract category members (words beginning with letter 'm') in 60 sec (norm=10+). -- BELOW AVERAGE        Treatment  No treatment was performed on this date of service.      Visit tracking:  Re-eval Due POC Expires Auth Expiration Date ST Visit Limit   2024 BOMN                           Visit/Unit Tracking  AUTH Status:  Date 2024   BOMN Used 1    Remaining  BOMN       Intervention comments:  45 minutes test administration; 45 minutes scoring and documentation of POC

## 2024-04-16 NOTE — PROGRESS NOTES
Daily Note     Today's date: 2024  Patient name: Robi Bello  : 1940  MRN: [id number]  Referring provider: Joaquim Dickerson MD  Dx: No diagnosis found.               Subjective: Trying to do some walking at home. Bed mobility is still difficult due to back pain.      Objective: See treatment diary below      Assessment:   Pt tolerated the session well. Pt performed treadmill walking for good promotion of cardiovascular endurance and neuroplasticity. Pt continues to have deficits in step length and foot clearance, but responded well to external cues of stepping to a pool noodle and verbal cues for big steps. Pt demonstrated improved bed mobility this session by being able to roll independently with up to 5 repetitions for practice. Pt also showed improved independence with sidelying <> sit transfer requiring only minAx1. However, pt requires max verbal cues for technique and requires increased repetitions to aid in learning bed mobility skills. Pt performed HKM well with the external cue of using walker for step height, but had increased difficulty with backwards walking requiring max verbal cues for technique and to stay on task. Pt would benefit from further skilled PT sessions to address deficits in gait, balance, endurance, flexibility, and overall safety needed for ADLs and community navigation.       Plan: Continue per plan of care.      Short Term Goal Expiration Date:(2024)  Long Term Goal Expiration Date: (2024)  POC Expiration Date: (2024)    Visit count: 4 of 10; PN due 2024    POC expires Unit limit Auth Expiration date PT/OT/ST + Visit Limit?   2024 N/A N/A BOMN                           Visit/Unit Tracking  AUTH Status:  Date        BOMN Used 1 2 3 4 5 6 7        Remaining                 Authorized (3/25/2024-2024)  Visits Requested Visits Authorized Visits Completed Visits Scheduled   99 99 6 2  "  Details      Manuals 04/11 04/16 04/02 04/04 04/09                                   Neuro Re-Ed   Pt education on importance of increasing trunk rotation and flexibility to perform bed mobility tasks.  Pt education on taking time getting up from supine lying due to symptoms of orthostatic hypotension.  Pt education on safety with toilet transfers.     Pt education on benefits of big movements and loud voice to have a more \"normal\" movement pattern and voice projection.  Pt education of benefits of daily maximal exercises to improve ROM and mobility with Parkinson's disease.     Pt education on breathing during exercise.    Hurdles         HKM  Solo   1/4 track   X 3 laps     Visual and verbal cues to lift knee to walker Solo   1/4 track   X 4 laps   HR 73  O2 99%    Visual and verbal cues to lift knee to walker Solo   1/4 track   X 4 laps   HR 88  O2 98%    Visual and verbal cues to lift knee to walker    Dynamic balance   Backwards walking   Solo   1/4 track   X 1 lap     Max verbal cues for technique  Backwards walking   // bars   X 1 lap     Max verbal cues for technique  Big stepping with [place] targets on floor for external cue  X 4 laps   Solo   1/4 track     STS    B/L UE   2 x 10   W/ above head arm raise for high five   HR 82, 100  O2 98%, 99%    Emphasis on loud counting and big movements     Stairs    Solo  Clinic staircase   Reciprocal pattern on ascent   Step-to pattern on descent   X 3 laps     Mod verbal cues for technique     Needed to remove glasses due to bifocals throwing balance off      Boxing     Reciprocal punches (cross body to encourage trunk rotation)   Solo  2 x 2 minute rounds   HR 82   O2 98%    Mod verbal cues for technique     Daily maximal exercises  Floor to ceiling with demo and max cues for increase excursion     Side to side rotation with external cues placing clips on vertical stick promoting increase trunk rotation and uE flexion out side of comfort zone.     X 20 each " direction     Floor to ceiling   X 20   Seated    Max verbal and visual cues for technique    Review for HEP   Diaphragmatic breathing      B/L UE D2 flexion with diaphragmatic breathing  Seated   X 10   (Encourage upright posture and breathing during exercises)   Bed mobility   Sit > supine transfers w/ supervision     Rolling to L and R, increased cueing to reaching across with opposite arm and cross leg over to roll  X 10 each side     Sidelying > sit transfer   MinAx1   Max verbal cues    Sit <> supine transfers   Rolling b/l   W/ supervision     Supine <> sit (mod Ax1 and max verbal cues for technique)    Open books      X 10 each side  (Encourage trunk rotation and decrease truncal rigidity due to PD )      Max verbal cues for technique    Ther Ex        Bike    Recumbent bike   L1 x 6 minutes     Mod-max A for foot placement on pedals   Verbal cues to maintain foot on pedals                                                             Ther Activity        Bed mobility  Rolling x 5   With min a x 1     Supine to sit x3  W/ min a x 1                Gait Training        Treadmill  Resume NV  Did not complete due to time constraint Solo   1.2 mph   0% incline   10 minutes   HR 74  O2 97%    Pool noodle for visual cue for big stepping.  Max verbal cues for big steps.  Solo   1.2 mph   0% incline   10 minutes   HR 84  O2 99%    Physioball for visual cue for big stepping   Max verbal cues for big steps   Solo   0.8 mph   0% incline   10 minutes   HR 88  O2 99%    Mod verbal cues for big stepping and foot clearance.   Mod visual cue for big stepping by tapping spot on the treadmill to step   Level surface  Busy environment in and out of clinic  No AD    CG x 150ft     HHA 150ft cues for navigating obstacles             Modalities

## 2024-04-18 ENCOUNTER — OFFICE VISIT (OUTPATIENT)
Dept: PHYSICAL THERAPY | Facility: CLINIC | Age: 84
End: 2024-04-18
Payer: COMMERCIAL

## 2024-04-18 DIAGNOSIS — G20.A1 PARKINSON'S DISEASE, UNSPECIFIED WHETHER DYSKINESIA PRESENT, UNSPECIFIED WHETHER MANIFESTATIONS FLUCTUATE: Primary | ICD-10-CM

## 2024-04-18 DIAGNOSIS — R26.89 BALANCE DISORDER: ICD-10-CM

## 2024-04-18 PROCEDURE — 97112 NEUROMUSCULAR REEDUCATION: CPT | Performed by: PHYSICAL THERAPIST

## 2024-04-25 ENCOUNTER — OFFICE VISIT (OUTPATIENT)
Dept: SPEECH THERAPY | Facility: CLINIC | Age: 84
End: 2024-04-25
Payer: COMMERCIAL

## 2024-04-25 ENCOUNTER — OFFICE VISIT (OUTPATIENT)
Dept: PHYSICAL THERAPY | Facility: CLINIC | Age: 84
End: 2024-04-25
Payer: COMMERCIAL

## 2024-04-25 DIAGNOSIS — F02.B0 MODERATE DEMENTIA DUE TO PARKINSON'S DISEASE, UNSPECIFIED WHETHER BEHAVIORAL, PSYCHOTIC, OR MOOD DISTURBANCE OR ANXIETY (HCC): ICD-10-CM

## 2024-04-25 DIAGNOSIS — R26.89 BALANCE DISORDER: ICD-10-CM

## 2024-04-25 DIAGNOSIS — R48.8 OTHER SYMBOLIC DYSFUNCTIONS: Primary | ICD-10-CM

## 2024-04-25 DIAGNOSIS — G20.A1 PARKINSON'S DISEASE, UNSPECIFIED WHETHER DYSKINESIA PRESENT, UNSPECIFIED WHETHER MANIFESTATIONS FLUCTUATE: Primary | ICD-10-CM

## 2024-04-25 DIAGNOSIS — G20.A1 MODERATE DEMENTIA DUE TO PARKINSON'S DISEASE, UNSPECIFIED WHETHER BEHAVIORAL, PSYCHOTIC, OR MOOD DISTURBANCE OR ANXIETY (HCC): ICD-10-CM

## 2024-04-25 PROCEDURE — 97129 THER IVNTJ 1ST 15 MIN: CPT

## 2024-04-25 PROCEDURE — 97110 THERAPEUTIC EXERCISES: CPT | Performed by: PHYSICAL THERAPIST

## 2024-04-25 PROCEDURE — 97130 THER IVNTJ EA ADDL 15 MIN: CPT

## 2024-04-25 PROCEDURE — 97112 NEUROMUSCULAR REEDUCATION: CPT | Performed by: PHYSICAL THERAPIST

## 2024-04-25 PROCEDURE — 97150 GROUP THERAPEUTIC PROCEDURES: CPT | Performed by: PHYSICAL THERAPIST

## 2024-04-25 NOTE — PROGRESS NOTES
Progress Update     Today's date: 2024  Patient name: Robi Bello  : 1940  MRN: [id number]  Referring provider: Joaquim Dickerson MD  Dx:   Encounter Diagnosis     ICD-10-CM    1. Parkinson's disease, unspecified whether dyskinesia present, unspecified whether manifestations fluctuate  G20.A1       2. Balance disorder  R26.89           Start Time: 1015  Stop Time: 1100  Total time in clinic (min): 45 minutes  Assessment:   Pt tolerated treatment well. Upon reassessment, pt has shown improvements in endurance, balance, and strength. Pt demonstrated improved LE strength and power as evidenced by being able to perform the 5xSTS without use of UEs , a significant increase in gait speed to 1.02 m/s without an AD, improved balance via an FGA score from 1430 to 19/30 as well as being able to perform static standing for 30 sec with EC on foam. Pt also had improved confidence associated with balance with an improved ABC scale score from 17.5% to 51.8%. Although his gait speed demonstrates a decreased fall risk and other improvements, pt continues to be at a high risk of falls based on his FGA, TUG, 5xSTS, and ABC scale. Pt was able to meet all short term goals at this point except for his 6MWT test goal. However, pt was able to complete the 6MWT without an AD this session which may have contributed to not increasing his walking distance during the test. Due to the patients deficits in endurance, balance, strength, and overall safety, pt would benefit from further skilled PT sessions to improve mobility related to his ADLs and community ambulation.     Impairments: abnormal coordination, abnormal gait, abnormal movement, activity intolerance, impaired balance, impaired physical strength, lacks appropriate home exercise program, safety issue and poor posture   Barriers to therapy: Limited transportation, history of migraines and Meniere disease  Understanding of Dx/Px/POC: good   Prognosis:  good    Goals  STG:  Patient will improve 6MWT by 200 ft with RW demonstrating significant improvements in endurance  w/in 4 weeks (Not MET, but was able to complete without AD)  Patient will be able to perform STS without use of UE within 4 weeks to demonstrate improvements in functional strength and power. MET  Patient will be able to walk household distances (100ft or more) with SPC and supervision within 4 weeks to address his goal of walking more independently. MET  Patient will be independent with HEP within 4 weeks MET  LTG:  Patient will improve 6MWT by 400 ft demonstrating significant improvements in endurance  w/in 8 weeks  Patient will demonstrated improved LE strength and endurance by improved 5xSTS to 12s or less without the use of UE within 8 weeks  Patient will improve TUG to 12s or less indicating they are no longer at risk for increased falls 8 weeks  Patient will demonstrates significant improvements in dynamic balance by improving FGA score by 6 points or more within 8 weeks  Patient will improve gait speed to 1.0 m/s indicating they are no longer at higher risk for falls within 8 weeks MET (4/25)    New Goals as of 4/25/24:  Patient will be able to perform all bed mobility independently with minimal verbal cues within 4 weeks.   Patient will improve gait speed to 1.2 m/s indicating a speed needed to safely cross the street within 4 weeks.   Patient will be able to walk 1000ft or more during the 6MWT without an assistive device within 4 weeks to demonstrate improved endurance.     Plan  Patient would benefit from: skilled physical therapy  Referral necessary: No  Planned therapy interventions: home exercise program, graded motor, graded exercise, graded activity, gait training, flexibility, functional ROM exercises, community reintegration, ADL training, balance, motor coordination training, neuromuscular re-education, patient education, postural training, sensory integrative techniques,  strengthening, stretching, therapeutic activities, therapeutic exercise, therapeutic training and transfer training  Frequency: 2x week  Duration in weeks: 8  Plan of Care beginning date: 3/25/2024  Plan of Care expiration date: 5/20/2024  Treatment plan discussed with: patient and family    Subjective: Believes therapy is helping. HEP is going well. Rolling in bed is getting easier, but still having trouble getting to sitting without assistance from wife.     Patient pain: 0/10  Patient goals: be able to sit up from laying in bed independently, improve balance, improve walking     Objective: See treatment diary below    Gait Assessment  03/25/24 03/28/24 04/25/24      6 Minute Walk Test (ft): Pre: HR 66  O2 99%  775 ft with RW  Post: HR 85 O2 98%  750 ft without RW   Post: HR 87 O2 99%      2 Minute Walk Test (ft): 225 ft with RW        Gait Speed (ft/s): 20 ft / 8 sec = 2.5 ft/s  0.76 m/s  20 ft / 6 sec =   1.02 m/s       TUG NT    TUG Score:   TUG Manual Score:   TUG Cognitive Score: TUG Score: 26.75 sec (no AD)  TUG Cognitive Score: 28.41 sec (no AD), only able to perform cognitive tasks for first 10 sec  TUG Score: 23 sec (No AD)  Tug COG Score: 26 cog (No AD)      FGA (<22/30 increased fall risk) 14/30 19/30      Sit to Stand Assessment  5xSTS score (time): 14 sec   Use of B/L UE (pt did not stand up all the way)   40 sec no UE       Balance:         Modified Clinical Test of Sensory Interaction on Balance   NT  Firm surface:  EO  EC  FOAM surface  EO  EC Firm surface:  EO 30 sec   EC 30 sec   FOAM surface  EO 30 sec   EC 3 sec, 11 sec, 21 sec  Firm surface:  EO 30 sec   EC 30 sec   FOAM surface  EO 30 sec   EC 11sec, 30 sec  Firm surface:  EO  EC  FOAM surface  EO  EC     Activities Specific Balance Confidence Scale (ABC) score: 17.5 %  51.88%      Hand Function Assessment  Hole Peg Test Score: NT  NT        1:1 Treatment (7361-6471): 30 minutes   Group (5769-2370): 10 minutes   Self directed (9253-0445): 5  "minutes        Short Term Goal Expiration Date:(04/22/2024)  Long Term Goal Expiration Date: (05/20/2024)  POC Expiration Date: (05/20/2024)    Visit count: 9 of 10; PN due 04/22/2024    POC expires Unit limit Auth Expiration date PT/OT/ST + Visit Limit?   05/20/2024 N/A N/A BOMN                           Visit/Unit Tracking  AUTH Status:  Date 03/25 03/28 04/02 04/04 04/09 04/11 04/16 04/18 04/25  PN     BOMN Used 1 2 3 4 5 6 7 8 9      Remaining  9 8 7 6 5 4 3 2 1       Authorized (3/25/2024-12/31/2024)  Visits Requested Visits Authorized Visits Completed Visits Scheduled   99 99 6 2   Details      Manuals 04/11 04/16 04/18 04/25 04/09                                   Neuro Re-Ed   Pt education on importance of increasing trunk rotation and flexibility to perform bed mobility tasks.  Pt education on technique with bed mobility and that decreased trunk rotation and rigidity may lead to back pain.  Pt education on all balance test findings including FGA, mCTSIB, and TUG Pt education of benefits of daily maximal exercises to improve ROM and mobility with Parkinson's disease.     Pt education on breathing during exercise.    Hurdles         HKM  Solo   1/4 track   X 3 laps     Visual and verbal cues to lift knee to walker Hallway   No AD   X 100 ft     Mod verbal cues for technique      Dynamic balance   Backwards walking   Solo   1/4 track   X 1 lap     Max verbal cues for technique  Forward walking in hallway   No AD   X 100 ft   Focus on big steps     Forward walking in hallway   No AD   Reciprocal arm swing   X 3 laps (300 ft)    Backwards walking in hallway   No AD   Reciprocal arm swing   X 1 lap (100ft)     STS   Single UE   2 x 10   Emphasis on upright posture during stand     Verbal cues to \"jump out of chair\" to emphasize big movement      Stairs         Boxing         Daily maximal exercises  Floor to ceiling with demo and max cues for increase excursion     Side to side rotation with external cues placing " clips on vertical stick promoting increase trunk rotation and uE flexion out side of comfort zone.     X 20 each direction     Floor to ceiling   X 20   Seated    Max verbal and visual cues for technique    Review for HEP   Diaphragmatic breathing      B/L UE D2 flexion with diaphragmatic breathing  Seated   X 10   (Encourage upright posture and breathing during exercises)   Bed mobility   Sit > supine transfers w/ supervision     Rolling to L and R, increased cueing to reaching across with opposite arm and cross leg over to roll  X 10 each side     Sidelying > sit transfer   MinAx1   Max verbal cues  Sit > supine transfer w/ supervison   X 2     Rolling to L and R with use of 1 blazepod for external cue to reach   X 12 each side     Min verbal cues for technique     Sidelying > sit transfer   MinAx1   Max verbal cues     Review for HEP   Sit <> supine transfers   Rolling b/l   W/ supervision     Supine <> sit (mod Ax1 and max verbal cues for technique)    Open books      X 10 each side  (Encourage trunk rotation and decrease truncal rigidity due to PD )      Max verbal cues for technique    Ther Ex    Pt education on all test findings such as 5xSTS and 6MWT    Bike                                                                Ther Activity        Bed mobility  Rolling x 5   With min a x 1     Supine to sit x3  W/ min a x 1                Gait Training        Treadmill  Resume NV  Did not complete due to time constraint Solo   1.2 mph   0% incline   10 minutes   HR 74  O2 97%    Pool noodle for visual cue for big stepping.  Max verbal cues for big steps.    Solo   0.8 mph   0% incline   10 minutes   HR 88  O2 99%    Mod verbal cues for big stepping and foot clearance.   Mod visual cue for big stepping by tapping spot on the treadmill to step   Level surface  Busy environment in and out of clinic  No AD    CG x 150ft     HHA 150ft cues for navigating obstacles             Modalities

## 2024-04-25 NOTE — PROGRESS NOTES
Daily Speech Treatment Note    Today's date: 2024   Patient’s name: Robi Bello  : 1940  MRN: [id number]  Safety measures: PD, Fall Risk  Referring provider: Joaquim Dickerson MD    Encounter Diagnosis     ICD-10-CM    1. Other symbolic dysfunctions  R48.8       2. Moderate dementia due to Parkinson's disease, unspecified whether behavioral, psychotic, or mood disturbance or anxiety ([facility])  G20.A1     F02.B0           Visit Tracking:  Visit tracking:  Re-eval Due POC Expires Auth Expiration Date ST Visit Limit   2024 BOMN                           Visit/Unit Tracking  AUTH Status:  Date 2024   BOMN Used 1 2    Remaining  BOMN BOMN       Subjective/Behavioral:  -Patient stated they preferred the BLINK! Game used during today's session more so than the more drill based activities.    Objective/Assessment:  -Reviewed testing results and goals in plan care with patient. Patient is in agreement at this time.    Short-term goals:  1. Patient will complete word generation tasks (e.g., categorical naming, sentence/phrase completion, word deduction, definitions, etc.) at 75% accuracy with min semantic and phonemic cues from clinician as needed to target the maintenance of his/her expressive vocabulary, as well as promote socialization and safety.    To target word generation to promote QOL: Patient worked on generating words for concrete categories based on items given (e.g. asparagus, radish, cabbage, _________). Across 6 attempts and given minimal semantic cues and phonemic cues for the category and other items, the patient generated an item for 5/6 (83%) questions. Task was slow for the patient and there moments where they attempted to provide an item that was listed already. Rest of page was not provided as homework due to patient struggling with the sheet without the assistance of the clinician's guidance.    2. Patient will complete cognitive-linguistic  activities (e.g., verbal and visual problem solving scenarios, sequencing, reasoning, etc.) at 75% accuracy with min semantic cues from clinician as needed to target the maintenance of his/her cognitive-linguistic functioning, as well as promote safety and overall QOL.    To target cognitive-linguistic skills to target maintenance: Patient played a modified game of BLINK! Where the clinician and patient matched cards based on features to items already played. Across 11 cards the patient correctly matched or didn't play cards do to there being no match 8/11 (73%) times given minimal guiding cues regarding the game by the clinician.   Next, the patient worked on recalling details about [place] by studying cards for 1 minute and 30 seconds and then answering questions. For questions they got wrong, the patient was given the cards back for 1 minute and the clinician cued them for what questions to look over again. Across 14 questions, the patient recalled 10/14 (71%) items on a first attempt or a second attempt given minimal semantic or phonological cueing. Patient needed to be cued to use rehearsal during their time studying the card.    Plan:  -Continue with current plan of care.

## 2024-04-30 ENCOUNTER — OFFICE VISIT (OUTPATIENT)
Dept: PHYSICAL THERAPY | Facility: CLINIC | Age: 84
End: 2024-04-30
Payer: COMMERCIAL

## 2024-04-30 DIAGNOSIS — G20.A1 PARKINSON'S DISEASE, UNSPECIFIED WHETHER DYSKINESIA PRESENT, UNSPECIFIED WHETHER MANIFESTATIONS FLUCTUATE: Primary | ICD-10-CM

## 2024-04-30 DIAGNOSIS — R26.89 BALANCE DISORDER: ICD-10-CM

## 2024-04-30 PROCEDURE — 97112 NEUROMUSCULAR REEDUCATION: CPT | Performed by: PHYSICAL THERAPIST

## 2024-04-30 NOTE — PROGRESS NOTES
Daily Note     Today's date: 2024  Patient name: Robi Bello  : 1940  MRN: [id number]  Referring provider: Jaoquim Dickerson MD  Dx:   Encounter Diagnosis     ICD-10-CM    1. Parkinson's disease, unspecified whether dyskinesia present, unspecified whether manifestations fluctuate  G20.A1       2. Balance disorder  R26.89           Start Time: 1020  Stop Time: 1100  Total time in clinic (min): 40 minutes    Subjective: No new complaints this session. Rolling has been getting easier and has been practicing daily for HEP. Feeling mixed up at times and having trouble with memory.       Objective: See treatment diary below      Assessment:   Pt tolerated treatment well this session. Pt was challenged with dynamic balance interventions such as hurdles and transitioning from even and uneven surfaces. Pt tolerated well despite having difficulty with lateral stepping over hurdles. Pt needed max cues to stay on task and for technique with exercises. Pt also experienced freezing which resolved with lateral weight shifting and marching in place. Pt did well transitioning from airex pads and steps, but required SPC for support and max verbal cues for sequencing. Pt would benefit from further skilled PT sessions to address deficits in endurance, balance, strength, and overall safety needed for ADLs and community ambulation.       Plan: Continue per plan of care.      Short Term Goal Expiration Date:(2024)  Long Term Goal Expiration Date: (2024)  POC Expiration Date: (2024)    Visit count: 9 of 10; PN due 2024    POC expires Unit limit Auth Expiration date PT/OT/ST + Visit Limit?   2024 N/A N/A BOMN                           Visit/Unit Tracking  AUTH Status:  Date   PN     BOMN Used 1 2 3 4 5 6 7 8 9 10     Remaining  9 8 7 6 5 4 3 2 1 0      Authorized (3/25/2024-2024)  Visits Requested Visits Authorized Visits Completed  "Visits Scheduled   99 [phone] 9         Manuals 04/11 04/16 04/18 04/25 04/30                                   Neuro Re-Ed   Pt education on importance of increasing trunk rotation and flexibility to perform bed mobility tasks.  Pt education on technique with bed mobility and that decreased trunk rotation and rigidity may lead to back pain.  Pt education on all balance test findings including FGA, mCTSIB, and TUG Pt education on strategies to reduce freezing episodes including lateral weight shifting and marching.    Hurdles      Solo   1/4 track   4# AW B/L  6\" hurdles staggered for increased step length   X 3 laps     Lateral x 2 laps   (Max verbal cues)    HKM  Solo   1/4 track   X 3 laps     Visual and verbal cues to lift knee to walker Hallway   No AD   X 100 ft     Mod verbal cues for technique   Solo   1/4 track   X 3 laps     Attempted to do reciprical arm swing but was unable to coordinate movement   Dynamic balance   Backwards walking   Solo   1/4 track   X 1 lap     Max verbal cues for technique  Forward walking in hallway   No AD   X 100 ft   Focus on big steps     Forward walking in hallway   No AD   Reciprocal arm swing   X 3 laps (300 ft)    Backwards walking in hallway   No AD   Reciprocal arm swing   X 1 lap (100ft)  Solo   1/4 track   Obstacles   (3 airex pads, 2x6\" steps)   Use of SPC   X 3 laps   Max verbal cues for technique    STS   Single UE   2 x 10   Emphasis on upright posture during stand     Verbal cues to \"jump out of chair\" to emphasize big movement   Single UE   2 x 10   Emphasis on upright posture during stand    Stairs         Boxing         Daily maximal exercises  Floor to ceiling with demo and max cues for increase excursion     Side to side rotation with external cues placing clips on vertical stick promoting increase trunk rotation and uE flexion out side of comfort zone.     X 20 each direction        Diaphragmatic breathing         Bed mobility   Sit > supine transfers w/ " supervision     Rolling to L and R, increased cueing to reaching across with opposite arm and cross leg over to roll  X 10 each side     Sidelying > sit transfer   MinAx1   Max verbal cues  Sit > supine transfer w/ supervison   X 2     Rolling to L and R with use of 1 blazepod for external cue to reach   X 12 each side     Min verbal cues for technique     Sidelying > sit transfer   MinAx1   Max verbal cues     Review for HEP      Open books         Ther Ex    Pt education on all test findings such as 5xSTS and 6MWT    Bike                                                                Ther Activity        Bed mobility  Rolling x 5   With min a x 1     Supine to sit x3  W/ min a x 1                Gait Training        Treadmill  Resume NV  Did not complete due to time constraint Solo   1.2 mph   0% incline   10 minutes   HR 74  O2 97%    Pool noodle for visual cue for big stepping.  Max verbal cues for big steps.       Level surface  Busy environment in and out of clinic  No AD    CG x 150ft     HHA 150ft cues for navigating obstacles             Modalities

## 2024-05-02 ENCOUNTER — OFFICE VISIT (OUTPATIENT)
Dept: PHYSICAL THERAPY | Facility: CLINIC | Age: 84
End: 2024-05-02
Payer: COMMERCIAL

## 2024-05-02 DIAGNOSIS — R26.89 BALANCE DISORDER: ICD-10-CM

## 2024-05-02 DIAGNOSIS — G20.A1 PARKINSON'S DISEASE, UNSPECIFIED WHETHER DYSKINESIA PRESENT, UNSPECIFIED WHETHER MANIFESTATIONS FLUCTUATE: Primary | ICD-10-CM

## 2024-05-02 PROCEDURE — 97110 THERAPEUTIC EXERCISES: CPT | Performed by: PHYSICAL THERAPIST

## 2024-05-02 PROCEDURE — 97112 NEUROMUSCULAR REEDUCATION: CPT | Performed by: PHYSICAL THERAPIST

## 2024-05-02 NOTE — PROGRESS NOTES
"Daily Note     Today's date: 2024  Patient name: Robi Bello  : 1940  MRN: [id number]  Referring provider: Joqauim Dickerson MD  Dx: No diagnosis found.               Subjective: \"My head doesn't feel right. I can't remember and think straight. R LBP is acting up.\" Haven't been sleeping in his usual bed which has been bothering his back.      Objective: See treatment diary below  LBP: 6/10     Assessment:   Pt tolerated treatment well this session. Pt performed biking on the Nu-step to encourage cardiovascular endurance and flexibility. Pt was also challenged with dynamic balance interventions with blazepods and cone weaving. Pt tolerated well despite having difficulty with cone weaving requiring mod verbal cues for technique. Pt demonstrated improved ability to perform backwards walking and lateral stepping. Pt responded well to counting the amount of steps to take to each blaze pod to encourage increased step length. Pt would benefit from further skilled PT sessions to address deficits in endurance, balance, strength, and overall safety needed for ADLs and community ambulation.       Plan: Continue per plan of care.      Short Term Goal Expiration Date:(2024)  Long Term Goal Expiration Date: (2024)  POC Expiration Date: (2024)    Visit count: 9 of 10; PN due 2024    POC expires Unit limit Auth Expiration date PT/OT/ST + Visit Limit?   2024 N/A N/A BOMN                           Visit/Unit Tracking  AUTH Status:  Date   PN    BOMN Used 1 2 3 4 5 6 7 8 9 10 1    Remaining  9 8 7 6 5 4 3 2 1 0 9     Authorized (3/25/2024-2024)  Visits Requested Visits Authorized Visits Completed Visits Scheduled   [phone]         Manuals                                    Neuro Re-Ed   Pt education on importance of increasing trunk rotation and flexibility to perform bed mobility tasks.  " "Pt education on technique with bed mobility and that decreased trunk rotation and rigidity may lead to back pain.  Pt education on all balance test findings including FGA, mCTSIB, and TUG Pt education on strategies to reduce freezing episodes including lateral weight shifting and marching.    Hurdles      Solo   1/4 track   4# AW B/L  6\" hurdles staggered for increased step length   X 3 laps     Lateral x 2 laps   (Max verbal cues)    HKM  Solo   1/4 track   X 3 laps     Visual and verbal cues to lift knee to walker Hallway   No AD   X 100 ft     Mod verbal cues for technique   Solo   1/4 track   X 3 laps     Attempted to do reciprical arm swing but was unable to coordinate movement   Dynamic balance  Solo  1/4 track  Cone weaving   5 cones   Mod verbal cues for technique   X 2 laps    Backwards walking   Solo   1/4 track   X 1 lap     Max verbal cues for technique  Forward walking in hallway   No AD   X 100 ft   Focus on big steps     Forward walking in hallway   No AD   Reciprocal arm swing   X 3 laps (300 ft)    Backwards walking in hallway   No AD   Reciprocal arm swing   X 1 lap (100ft)  Solo   1/4 track   Obstacles   (3 airex pads, 2x6\" steps)   Use of SPC   X 3 laps   Max verbal cues for technique    STS Single UE   1 x 10     No UE   Tactile cue for forward weight shift   2 x 10   Single UE   2 x 10   Emphasis on upright posture during stand     Verbal cues to \"jump out of chair\" to emphasize big movement   Single UE   2 x 10   Emphasis on upright posture during stand    Stairs         Boxing         Daily maximal exercises         Diaphragmatic breathing         Bed mobility   Sit > supine transfers w/ supervision     Rolling to L and R, increased cueing to reaching across with opposite arm and cross leg over to roll  X 10 each side     Sidelying > sit transfer   MinAx1   Max verbal cues  Sit > supine transfer w/ supervison   X 2     Rolling to L and R with use of 1 blazepod for external cue to reach   X 12 " each side     Min verbal cues for technique     Sidelying > sit transfer   MinAx1   Max verbal cues     Review for HEP      Open books         Blazepods  4 blazepods   Solo   1/4 track   Fwd/bwd walking   X 2 trials   2 min rounds  Trial 1: 8 hits  Trial 2: 8 hits     4 blazepods   Solo   1/4 track   Lateral stepping   X 4 trials  1  min rounds   Trial 1: 5 hits   Trial 2: 5 hits   Trial 3: 5 hits  Trial 4: 6 hits   - Verbal cueing for taking a certain number of steps to each pod to improve step length        Ther Ex    Pt education on all test findings such as 5xSTS and 6MWT    Bike Nu-step   L5 x 10 min     Flexibility and endurance                                                                Ther Activity        Bed mobility                 Gait Training        Treadmill   Solo   1.2 mph   0% incline   10 minutes   HR 74  O2 97%    Pool noodle for visual cue for big stepping.  Max verbal cues for big steps.       Level surface            Modalities

## 2024-05-03 NOTE — PROGRESS NOTES
"Daily Speech Treatment Note    Today's date: 2024  Patient’s name: Robi Bello  : 1940  MRN: [id number]  Safety measures: PD, Fall Risk  Referring provider: Joaquim Dickerson MD    Encounter Diagnosis     ICD-10-CM    1. Other symbolic dysfunctions  R48.8       2. Moderate dementia due to Parkinson's disease, unspecified whether behavioral, psychotic, or mood disturbance or anxiety ([facility])  G20.A1     F02.B0           Visit Tracking:  Visit tracking:  Re-eval Due POC Expires Auth Expiration Date ST Visit Limit   2024 BOMN                           Visit/Unit Tracking  AUTH Status:  Date 2024   BOMN Used 1 2 3    Remaining  BOMN BOMN BOMN       Subjective/Behavioral:  -Patient participated in therapeutic activities    Objective/Assessment:  -Reviewed testing results and goals in plan care with patient. Patient is in agreement at this time.    Short-term goals:  1. Patient will complete word generation tasks (e.g., categorical naming, sentence/phrase completion, word deduction, definitions, etc.) at 75% accuracy with min semantic and phonemic cues from clinician as needed to target the maintenance of his/her expressive vocabulary, as well as promote socialization and safety.  Patient completed phrase completion with 85% accuracy    SLP gave description and patient named object with 85% accuracy    2. Patient will complete cognitive-linguistic activities (e.g., verbal and visual problem solving scenarios, sequencing, reasoning, etc.) at 75% accuracy with min semantic cues from clinician as needed to target the maintenance of his/her cognitive-linguistic functioning, as well as promote safety and overall QOL.  Patient able to name correct time for activities of daily living in 8/8 trials; patient had difficulty with temporal problem solving, such as \"if your dr's appt is at 9:00 and it takes 30 minutes to get there. What time do you need to " "leave?\"    Patient completed calendar activity with 50% accuracy when looking at a calendar      Plan:  -Continue with current plan of care.   "

## 2024-05-07 ENCOUNTER — OFFICE VISIT (OUTPATIENT)
Dept: PHYSICAL THERAPY | Facility: CLINIC | Age: 84
End: 2024-05-07
Payer: COMMERCIAL

## 2024-05-07 ENCOUNTER — OFFICE VISIT (OUTPATIENT)
Dept: SPEECH THERAPY | Facility: CLINIC | Age: 84
End: 2024-05-07
Payer: COMMERCIAL

## 2024-05-07 DIAGNOSIS — R48.8 OTHER SYMBOLIC DYSFUNCTIONS: Primary | ICD-10-CM

## 2024-05-07 DIAGNOSIS — R26.89 BALANCE DISORDER: ICD-10-CM

## 2024-05-07 DIAGNOSIS — G20.A1 MODERATE DEMENTIA DUE TO PARKINSON'S DISEASE, UNSPECIFIED WHETHER BEHAVIORAL, PSYCHOTIC, OR MOOD DISTURBANCE OR ANXIETY (HCC): ICD-10-CM

## 2024-05-07 DIAGNOSIS — G20.A1 PARKINSON'S DISEASE, UNSPECIFIED WHETHER DYSKINESIA PRESENT, UNSPECIFIED WHETHER MANIFESTATIONS FLUCTUATE: Primary | ICD-10-CM

## 2024-05-07 DIAGNOSIS — F02.B0 MODERATE DEMENTIA DUE TO PARKINSON'S DISEASE, UNSPECIFIED WHETHER BEHAVIORAL, PSYCHOTIC, OR MOOD DISTURBANCE OR ANXIETY (HCC): ICD-10-CM

## 2024-05-07 PROCEDURE — 97116 GAIT TRAINING THERAPY: CPT | Performed by: PHYSICAL THERAPIST

## 2024-05-07 PROCEDURE — 92507 TX SP LANG VOICE COMM INDIV: CPT

## 2024-05-07 PROCEDURE — 97112 NEUROMUSCULAR REEDUCATION: CPT | Performed by: PHYSICAL THERAPIST

## 2024-05-07 NOTE — PROGRESS NOTES
Daily Note     Today's date: 2024  Patient name: Robi Bello  : 1940  MRN: [id number]  Referring provider: Joaquim Dickerson MD  Dx: No diagnosis found.               Subjective: Some complaints of lightheadedness with activity.       Objective: See treatment diary below      Assessment:   Pt tolerated the treatment well this session. Pt performed treadmill walking to encourage cardiovascular endurance, neuroplasticity, and improve gait. Pt responded well to using gray physioball as an external cue for big stepping, but had an episode of lightheadedness after walking for 5 minutes. Pt had an increased BP after treadmill walking which recovered with a 2 minute rest. Pt was educated during rest to stay hydrated and to perform transfers slowly to encourage safety with orthostatic-like symptoms. Pt performed cone weaving better this session with decreased verbal cues needed for technique. However, pt seemed to have decreased scanning for cones due to neck and trunk rigidity. Pt responded well to playing catch with physioball to encourage trunk rotation. Pt would benefit from further skilled PT sessions to address deficits in endurance, balance, strength, and overall safety needed to peform ADLs and community ambulation.     Plan: Continue per plan of care.      Short Term Goal Expiration Date:(2024)  Long Term Goal Expiration Date: (2024)  POC Expiration Date: (2024)    Visit count: 9 of 10; PN due 2024    POC expires Unit limit Auth Expiration date PT/OT/ST + Visit Limit?   2024 N/A N/A BOMN                           Visit/Unit Tracking  AUTH Status:  Date   PN    BOMN Used 1 2 3 4 5 6 7 8 9 10 1 2    Remaining  9 8 7 6 5 4 3 2 1 0 9 8   uthorized (3/25/2024-2024)  Visits Requested Visits Authorized Visits Completed Visits Scheduled   [phone] 13 [phone]   Details        Manuals   "04/30                                   Neuro Re-Ed    Pt education on technique with bed mobility and that decreased trunk rotation and rigidity may lead to back pain.  Pt education on all balance test findings including FGA, mCTSIB, and TUG Pt education on strategies to reduce freezing episodes including lateral weight shifting and marching.    Hurdles      Solo   1/4 track   4# AW B/L  6\" hurdles staggered for increased step length   X 3 laps     Lateral x 2 laps   (Max verbal cues)    HKM  Solo   1/4 track   Reciprocal arm swing   X 3 laps    Hallway   No AD   X 100 ft     Mod verbal cues for technique   Solo   1/4 track   X 3 laps     Attempted to do reciprical arm swing but was unable to coordinate movement   Dynamic balance  Solo  1/4 track  Cone weaving   5 cones   Mod verbal cues for technique   X 2 laps    Solo  1/4 track  Cone weaving   5 cones   Min verbal cues for technique   X 3 laps     Solo  1/4 track   WBOS   Foam beams   Use of SPC   X 4 laps    Forward walking in hallway   No AD   X 100 ft   Focus on big steps     Forward walking in hallway   No AD   Reciprocal arm swing   X 3 laps (300 ft)    Backwards walking in hallway   No AD   Reciprocal arm swing   X 1 lap (100ft)  Solo   1/4 track   Obstacles   (3 airex pads, 2x6\" steps)   Use of SPC   X 3 laps   Max verbal cues for technique    STS Single UE   1 x 10     No UE   Tactile cue for forward weight shift   2 x 10  Single UE   2 x 10  Single UE   2 x 10   Emphasis on upright posture during stand     Verbal cues to \"jump out of chair\" to emphasize big movement   Single UE   2 x 10   Emphasis on upright posture during stand    Stairs         Boxing         Daily maximal exercises         Diaphragmatic breathing         Bed mobility    Sit > supine transfer w/ supervison   X 2     Rolling to L and R with use of 1 blazepod for external cue to reach   X 12 each side     Min verbal cues for technique     Sidelying > sit transfer   MinAx1   Max verbal " cues     Review for HEP      Open books         Blazepods  4 blazepods   Solo   1/4 track   Fwd/bwd walking   X 2 trials   2 min rounds  Trial 1: 8 hits  Trial 2: 8 hits     4 blazepods   Solo   1/4 track   Lateral stepping   X 4 trials  1  min rounds   Trial 1: 5 hits   Trial 2: 5 hits   Trial 3: 5 hits  Trial 4: 6 hits   - Verbal cueing for taking a certain number of steps to each pod to improve step length        Ther Ex    Pt education on all test findings such as 5xSTS and 6MWT    Bike Nu-step   L5 x 10 min     Flexibility and endurance                                                                Ther Activity        Bed mobility                 Gait Training        Treadmill   Treadmill walking   1.0 mph   5 minutes   HR 59   O2 99%  /86   (5 min after, 132/80)     3 minutes   1.2 mph     Gray physioball for exaggerated heel strike and external cue for big stepping      Level surface            Modalities

## 2024-05-09 ENCOUNTER — OFFICE VISIT (OUTPATIENT)
Dept: PHYSICAL THERAPY | Facility: CLINIC | Age: 84
End: 2024-05-09
Payer: COMMERCIAL

## 2024-05-09 DIAGNOSIS — R26.89 BALANCE DISORDER: ICD-10-CM

## 2024-05-09 DIAGNOSIS — G20.A1 PARKINSON'S DISEASE, UNSPECIFIED WHETHER DYSKINESIA PRESENT, UNSPECIFIED WHETHER MANIFESTATIONS FLUCTUATE: Primary | ICD-10-CM

## 2024-05-09 PROCEDURE — 97112 NEUROMUSCULAR REEDUCATION: CPT | Performed by: PHYSICAL THERAPIST

## 2024-05-09 NOTE — PROGRESS NOTES
Daily Note     Today's date: 2024  Patient name: Robi Bello  : 1940  MRN: [id number]  Referring provider: Joaquim Dickerson MD  Dx:   Encounter Diagnosis     ICD-10-CM    1. Parkinson's disease, unspecified whether dyskinesia present, unspecified whether manifestations fluctuate  G20.A1       2. Balance disorder  R26.89           Start Time: 1100  Stop Time: 1145  Total time in clinic (min): 45 minutes    Subjective: Feeling good today. Still having difficulty getting out of bed due to back pain. Has been on and off with keeping up with HEP.       Objective: See treatment diary below      Assessment:   Pt tolerated the treatment well this session.Pt demonstrated improvements with walking with reciprocal arm swing and lateral stepping this session requiring decreased verbal cueing for technique since last session. Pt responds well to having a goal number of steps to reach destination to facilitate increased step length. Pt performed exercises to encourage trunk rotation this session to help decrease truncal rigidity and LBP. Pt responded well. Pt had a few episodes of lightheadedness this session with STS transfers and supine to sitting. Pt was encouraged to stay hydrated and to perform transfers slowly to encourage safety with orthostatic-like symptoms. Pt continues to have difficulty with sidelying <> sitting transfers requiring max verbal cues and minAx1 by SPT. Pt would benefit from further skilled PT sessions to address deficits in endurance, balance, strength, and overall safety needed to peform ADLs and community ambulation.       Plan: Continue per plan of care.      Short Term Goal Expiration Date:(2024)  Long Term Goal Expiration Date: (2024)  POC Expiration Date: (2024)    Visit count: 9 of 10; PN due 2024    POC expires Unit limit Auth Expiration date PT/OT/ST + Visit Limit?   2024 N/A N/A BOMN                           Visit/Unit Tracking  AUTH Status:   "Date 03/25 03/28 04/02 04/04 04/09 04/11 04/16 04/18 04/25  PN 04/30 05/01 05/07 05/09   BOMN Used 1 2 3 4 5 6 7 8 9 1 2 3 4    Remaining  9 8 7 6 5 4 3 2 1 9 8 7 6     Authorized (3/25/2024-12/31/2024)  Visits Requested Visits Authorized Visits Completed Visits Scheduled   [phone]         Manuals 05/01 05/07 05/09 04/25 04/30                                   Neuro Re-Ed    Pt education on reasons lightheaded symptoms may be happening and strategies and safety measures to use when feeling lightheaded.  Pt education on all balance test findings including FGA, mCTSIB, and TUG Pt education on strategies to reduce freezing episodes including lateral weight shifting and marching.    Hurdles      Solo   1/4 track   4# AW B/L  6\" hurdles staggered for increased step length   X 3 laps     Lateral x 2 laps   (Max verbal cues)    HKM  Solo   1/4 track   Reciprocal arm swing   X 3 laps    Solo   1/4 track   Reciprocal arm swing   X 4 laps   Solo   1/4 track   X 3 laps     Attempted to do reciprical arm swing but was unable to coordinate movement   Dynamic balance  Solo  1/4 track  Cone weaving   5 cones   Mod verbal cues for technique   X 2 laps    Solo  1/4 track  Cone weaving   5 cones   Min verbal cues for technique   X 3 laps     Solo  1/4 track   WBOS   Foam beams   Use of SPC   X 4 laps    Solo   1/4 track   Bwd walking   X 3 laps     Solo   1/4 track   Lateral stepping   X 3 laps   Solo   1/4 track   Obstacles   (3 airex pads, 2x6\" steps)   Use of SPC   X 3 laps   Max verbal cues for technique    STS Single UE   1 x 10     No UE   Tactile cue for forward weight shift   2 x 10  Single UE   2 x 10  B/L UE   1 x 10   Single UE   2 x 10   Emphasis on upright posture during stand    Stairs         Boxing         Daily maximal exercises         Diaphragmatic breathing         Bed mobility    Rolling   Max cues for technique to reach and cross leg towards side pt rolls to   X 5 each side     Sidelying <> Sit transfers   X " "3   MinAx1      Open books         Blazepods  4 blazepods   Solo   1/4 track   Fwd/bwd walking   X 2 trials   2 min rounds  Trial 1: 8 hits  Trial 2: 8 hits     4 blazepods   Solo   1/4 track   Lateral stepping   X 4 trials  1  min rounds   Trial 1: 5 hits   Trial 2: 5 hits   Trial 3: 5 hits  Trial 4: 6 hits   - Verbal cueing for taking a certain number of steps to each pod to improve step length   Seated on low mat table   2 blazepods on either side of pt   \"[place]\" ball lifts to hit blazepod either side   2 x 1:30 rounds   Trial 1: 8 hits   Trial 2: 12 hits      Trunk rotation    Solo   1/4 track   Passing red physioball with SPT with emphasis on trunk rotation and big UE reaching  X 10 each side     Supine   Lower trunk rotations   X 10 each side      Ther Ex    Pt education on all test findings such as 5xSTS and 6MWT    Bike Nu-step   L5 x 10 min     Flexibility and endurance                                                                Ther Activity        Bed mobility                 Gait Training        Treadmill   Treadmill walking   1.0 mph   5 minutes   HR 59   O2 99%  /86   (5 min after, 132/80)     3 minutes   1.2 mph     Gray physioball for exaggerated heel strike and external cue for big stepping      Level surface            Modalities                                                      "

## 2024-05-14 ENCOUNTER — OFFICE VISIT (OUTPATIENT)
Dept: PHYSICAL THERAPY | Facility: CLINIC | Age: 84
End: 2024-05-14
Payer: COMMERCIAL

## 2024-05-14 DIAGNOSIS — G20.A1 PARKINSON'S DISEASE, UNSPECIFIED WHETHER DYSKINESIA PRESENT, UNSPECIFIED WHETHER MANIFESTATIONS FLUCTUATE: Primary | ICD-10-CM

## 2024-05-14 DIAGNOSIS — R26.89 BALANCE DISORDER: ICD-10-CM

## 2024-05-14 PROCEDURE — 97112 NEUROMUSCULAR REEDUCATION: CPT | Performed by: PHYSICAL THERAPIST

## 2024-05-14 PROCEDURE — 97110 THERAPEUTIC EXERCISES: CPT | Performed by: PHYSICAL THERAPIST

## 2024-05-14 NOTE — PROGRESS NOTES
Daily Note     Today's date: 2024  Patient name: Robi Bello  : 1940  MRN: [id number]  Referring provider: Joaquim Dickerson MD  Dx:   Encounter Diagnosis     ICD-10-CM    1. Parkinson's disease, unspecified whether dyskinesia present, unspecified whether manifestations fluctuate  G20.A1       2. Balance disorder  R26.89           Start Time: 1200          Subjective: notes he hasn't been feeling the greatest over the last week or so feels disconnected.   Rolling in bed takes him awhile but he is able to do it a little bit better     Objective: See treatment diary below  Start of session /85      Assessment: Tolerated treatment well. Patient demonstrated fatigue post treatment and would benefit from continued PT  Requies vc's and demos to attend to task and for proper sequencing and technique. With slower processing. Challenged with reciprocal arm and leg movements. Slight complaints of low back pain throughout session but able to progress thorugh.     Plan: Continue per plan of care.  Attempt use of ankle weights at NV     Short Term Goal Expiration Date:(2024)  Long Term Goal Expiration Date: (2024)  POC Expiration Date: (2024)    Visit count: 5 of 10; PN due 2024    POC expires Unit limit Auth Expiration date PT/OT/ST + Visit Limit?   2024 N/A N/A BOMN                           Visit/Unit Tracking  AUTH Status:  Date 03/25 03/28 04/02 04/04 04/09 04/11 04/16 04/18 04/25  PN    BOMN Used 1 2 3 4 5 6 7 8 9 1 2 3 4    Remaining  9 8 7 6 5 4 3 2 1 9 8 7 6     AUTH Status:  Date                BOMN Used 5                Remaining  5                 Authorized (3/25/  Authorized (3/25/2024-2024)  Visits Requested Visits Authorized Visits Completed Visits Scheduled   [phone]         Manuals                                     Neuro Re-Ed    Pt education on reasons lightheaded symptoms may be happening and  "strategies and safety measures to use when feeling lightheaded.      Hurdles     Solo   Low  W/SPC  Non reciprocally   Fwdx4 laps    Lat  x 2 laps     HKM  Solo   1/4 track   Reciprocal arm swing   X 3 laps    Solo   1/4 track   Reciprocal arm swing   X 4 laps    Solo   1/4 track   Reciprocal arm swing     X 1 lap with demo   X2 laps no UE  X 2  laps w/UE    Dynamic balance  Solo  1/4 track  Cone weaving   5 cones   Mod verbal cues for technique   X 2 laps    Solo  1/4 track  Cone weaving   5 cones   Min verbal cues for technique   X 3 laps     Solo  1/4 track   WBOS   Foam beams   Use of SPC   X 4 laps    Solo   1/4 track   Bwd walking   X 3 laps     Solo   1/4 track   Lateral stepping   X 3 laps  Solo   1/4 track   Bwd walking   X 4 laps     STS Single UE   1 x 10     No UE   Tactile cue for forward weight shift   2 x 10  Single UE   2 x 10  B/L UE   1 x 10      Stairs         Boxing         Daily maximal exercises         Diaphragmatic breathing         Bed mobility    Rolling   Max cues for technique to reach and cross leg towards side pt rolls to   X 5 each side     Sidelying <> Sit transfers   X 3   MinAx1      Open books         Blazepods  4 blazepods   Solo   1/4 track   Fwd/bwd walking   X 2 trials   2 min rounds  Trial 1: 8 hits  Trial 2: 8 hits     4 blazepods   Solo   1/4 track   Lateral stepping   X 4 trials  1  min rounds   Trial 1: 5 hits   Trial 2: 5 hits   Trial 3: 5 hits  Trial 4: 6 hits   - Verbal cueing for taking a certain number of steps to each pod to improve step length   Seated on low mat table   2 blazepods on either side of pt   \"[place]\" ball lifts to hit blazepod either side   2 x 1:30 rounds   Trial 1: 8 hits   Trial 2: 12 hits      Trunk rotation    Solo   1/4 track   Passing red physioball with SPT with emphasis on trunk rotation and big UE reaching  X 10 each side     Supine   Lower trunk rotations   X 10 each side      Ther Ex    BP pre bike  135/80    Attempted recumbent unable "         Bike Nu-step   L5 x 10 min     Flexibility and endurance    Nustep  L5 x 10 min for endurance training                                                             Ther Activity        Bed mobility                 Gait Training        Treadmill   Treadmill walking   1.0 mph   5 minutes   HR 59   O2 99%  /86   (5 min after, 132/80)     3 minutes   1.2 mph     Gray physioball for exaggerated heel strike and external cue for big stepping      Level surface        CS in/out of clinic with cues to carry over big steps with use SPC  100ft x2    Modalities

## 2024-05-16 ENCOUNTER — OFFICE VISIT (OUTPATIENT)
Dept: PHYSICAL THERAPY | Facility: CLINIC | Age: 84
End: 2024-05-16
Payer: COMMERCIAL

## 2024-05-16 ENCOUNTER — OFFICE VISIT (OUTPATIENT)
Dept: SPEECH THERAPY | Facility: CLINIC | Age: 84
End: 2024-05-16
Payer: COMMERCIAL

## 2024-05-16 DIAGNOSIS — G20.A1 MODERATE DEMENTIA DUE TO PARKINSON'S DISEASE, UNSPECIFIED WHETHER BEHAVIORAL, PSYCHOTIC, OR MOOD DISTURBANCE OR ANXIETY (HCC): ICD-10-CM

## 2024-05-16 DIAGNOSIS — G20.A1 PARKINSON'S DISEASE, UNSPECIFIED WHETHER DYSKINESIA PRESENT, UNSPECIFIED WHETHER MANIFESTATIONS FLUCTUATE: Primary | ICD-10-CM

## 2024-05-16 DIAGNOSIS — R48.8 OTHER SYMBOLIC DYSFUNCTIONS: Primary | ICD-10-CM

## 2024-05-16 DIAGNOSIS — R26.89 BALANCE DISORDER: ICD-10-CM

## 2024-05-16 DIAGNOSIS — F02.B0 MODERATE DEMENTIA DUE TO PARKINSON'S DISEASE, UNSPECIFIED WHETHER BEHAVIORAL, PSYCHOTIC, OR MOOD DISTURBANCE OR ANXIETY (HCC): ICD-10-CM

## 2024-05-16 PROCEDURE — 97116 GAIT TRAINING THERAPY: CPT | Performed by: PHYSICAL THERAPIST

## 2024-05-16 PROCEDURE — 92507 TX SP LANG VOICE COMM INDIV: CPT

## 2024-05-16 PROCEDURE — 97112 NEUROMUSCULAR REEDUCATION: CPT | Performed by: PHYSICAL THERAPIST

## 2024-05-16 NOTE — PROGRESS NOTES
Daily Note     Today's date: 2024  Patient name: Robi Bello  : 1940  MRN: [id number]  Referring provider: Joaquim Dickerson MD  Dx:   Encounter Diagnosis     ICD-10-CM    1. Parkinson's disease, unspecified whether dyskinesia present, unspecified whether manifestations fluctuate  G20.A1       2. Balance disorder  R26.89           Start Time: 1230          Subjective: Still feeling a bit off. Had some issue with constipation.     Objective: See treatment diary below      Assessment: Tolerated treatment well. Patient demonstrated fatigue post treatment and would benefit from continued PT  Inconsistent carry over with increased step height and length with gait training requiring VC's. Requires cues to progress through session for sequencing and to navigate through clinic. With complaints of forearm soreness on treadmill likely from gripping tightly. Improved navigation over obstacles with repeated practice, demonstrate good step height and length to clear.     Plan: Continue per plan of care.  Progress update NV      Short Term Goal Expiration Date:(2024)  Long Term Goal Expiration Date: (2024)  POC Expiration Date: (2024)    Visit count: 5 of 10; PN due 2024    POC expires Unit limit Auth Expiration date PT/OT/ST + Visit Limit?   2024 N/A N/A BOMN                           Visit/Unit Tracking  AUTH Status:  Date 03/25 03/28 04/02 04/04 04/09 04/11 04/16 04/18 04/25  PN    BOMN Used 1 2 3 4 5 6 7 8 9 1 2 3 4    Remaining  9 8 7 6 5 4 3 2 1 9 8 7 6     AUTH Status:  Date               BOMN Used 5 6               Remaining  5 4                Authorized (3/25/  Authorized (3/25/2024-2024)  Visits Requested Visits Authorized Visits Completed Visits Scheduled   [phone] 5         Manuals                                    Neuro Re-Ed    Pt education on reasons lightheaded symptoms may be happening and  "strategies and safety measures to use when feeling lightheaded.      Hurdles     Solo   Low  W/SPC  Non reciprocally   Fwdx4 laps    Lat  x 2 laps  Solo    Obstacles of varying width and height (pool noodles crossed, bolsters x2 and foam beam)    Fwd x 5 laps no UE   Lat x 4 laps    HKM  Solo   1/4 track   Reciprocal arm swing   X 3 laps    Solo   1/4 track   Reciprocal arm swing   X 4 laps    Solo   1/4 track   Reciprocal arm swing     X 1 lap with demo   X2 laps no UE  X 2  laps w/UE    Dynamic balance  Solo  1/4 track  Cone weaving   5 cones   Mod verbal cues for technique   X 2 laps    Solo  1/4 track  Cone weaving   5 cones   Min verbal cues for technique   X 3 laps     Solo  1/4 track   WBOS   Foam beams   Use of SPC   X 4 laps    Solo   1/4 track   Bwd walking   X 3 laps     Solo   1/4 track   Lateral stepping   X 3 laps  Solo   1/4 track   Bwd walking   X 4 laps  Trunk ROT with physio ball ball toss   X5 ea   STS Single UE   1 x 10     No UE   Tactile cue for forward weight shift   2 x 10  Single UE   2 x 10  B/L UE   1 x 10   Single UE  2x10   Cues for upright posture in standing and controlled descent    Stairs         Boxing         Daily maximal exercises         Diaphragmatic breathing         Bed mobility    Rolling   Max cues for technique to reach and cross leg towards side pt rolls to   X 5 each side     Sidelying <> Sit transfers   X 3   MinAx1      Open books         Blazepods  4 blazepods   Solo   1/4 track   Fwd/bwd walking   X 2 trials   2 min rounds  Trial 1: 8 hits  Trial 2: 8 hits     4 blazepods   Solo   1/4 track   Lateral stepping   X 4 trials  1  min rounds   Trial 1: 5 hits   Trial 2: 5 hits   Trial 3: 5 hits  Trial 4: 6 hits   - Verbal cueing for taking a certain number of steps to each pod to improve step length   Seated on low mat table   2 blazepods on either side of pt   \"[place]\" ball lifts to hit blazepod either side   2 x 1:30 rounds   Trial 1: 8 hits   Trial 2: 12 hits    "   Trunk rotation    Solo   1/4 track   Passing red physioball with SPT with emphasis on trunk rotation and big UE reaching  X 10 each side     Supine   Lower trunk rotations   X 10 each side      Ther Ex    BP pre bike  135/80    Attempted recumbent unable         Bike Nu-step   L5 x 10 min     Flexibility and endurance    Nustep  L5 x 10 min for endurance training                                                             Ther Activity        Bed mobility                 Gait Training        Treadmill   Treadmill walking   1.0 mph   5 minutes   HR 59   O2 99%  /86   (5 min after, 132/80)     3 minutes   1.2 mph     Gray physioball for exaggerated heel strike and external cue for big stepping   1.0 mph  B/L UE   VC for increase step height and length     5min x2  HR 85   O2 96%   Level surface        CS in/out of clinic with cues to carry over big steps with use SPC  100ft x2    Modalities

## 2024-05-16 NOTE — PROGRESS NOTES
Daily Speech Treatment Note    Today's date: 2024  Patient’s name: Robi Bello  : 1940  MRN: [id number]  Safety measures: PD, Fall Risk  Referring provider: Joaquim Dickerson MD    Encounter Diagnosis     ICD-10-CM    1. Other symbolic dysfunctions  R48.8       2. Moderate dementia due to Parkinson's disease, unspecified whether behavioral, psychotic, or mood disturbance or anxiety ([facility])  G20.A1     F02.B0           Visit Tracking:  Visit tracking:  Re-eval Due POC Expires Auth Expiration Date ST Visit Limit   2024 BOMN                           Visit/Unit Tracking  AUTH Status:  Date 2024   BOMN Used 1 2 3 4    Remaining  BOMN BOMN BOMN BOMN       Subjective/Behavioral:  -Patient stated that nothing is new.     Objective/Assessment:  -Reviewed testing results and goals in plan care with patient. Patient is in agreement at this time.      Short-term goals:  1. Patient will complete word generation tasks (e.g., categorical naming, sentence/phrase completion, word deduction, definitions, etc.) at 75% accuracy with min semantic and phonemic cues from clinician as needed to target the maintenance of his/her expressive vocabulary, as well as promote socialization and safety.    To target word generation: Patient was verbally presented with an analogy with the last blank missing (e.g. [place] is to [place] as [place] is to ____).  Patient completed this task in 20/25 (80%) opportunities independently, increasing to 25/25 (100%) opportunities given semantic cues.    2. Patient will complete cognitive-linguistic activities (e.g., verbal and visual problem solving scenarios, sequencing, reasoning, etc.) at 75% accuracy with min semantic cues from clinician as needed to target the maintenance of his/her cognitive-linguistic functioning, as well as promote safety and overall QOL.    To target thought organization: Patient created patterns using  colorful blocks following a template.      Trial One: 5/16 (31%) opportunities independently, increasing to 16/16 (100%) opportunities given moderate to maximum visual and verbal cues.    Trial Two: 8/16 (50%) opportunities independently, increasing to 16/16 (100%) opportunities given moderate to maximum visual and verbal cues.      Plan:  -Continue with current plan of care.

## 2024-05-20 ENCOUNTER — PROBLEM (OUTPATIENT)
Dept: URBAN - METROPOLITAN AREA CLINIC 6 | Facility: CLINIC | Age: 84
End: 2024-05-20

## 2024-05-20 DIAGNOSIS — H04.123: ICD-10-CM

## 2024-05-20 DIAGNOSIS — Z96.1: ICD-10-CM

## 2024-05-20 DIAGNOSIS — H02.052: ICD-10-CM

## 2024-05-20 DIAGNOSIS — H02.885: ICD-10-CM

## 2024-05-20 DIAGNOSIS — H02.882: ICD-10-CM

## 2024-05-20 PROCEDURE — 67820 REVISE EYELASHES: CPT

## 2024-05-20 PROCEDURE — 92012 INTRM OPH EXAM EST PATIENT: CPT | Mod: 25

## 2024-05-20 ASSESSMENT — KERATOMETRY
OD_AXISANGLE2_DEGREES: 161
OD_K2POWER_DIOPTERS: 45.00
OS_AXISANGLE2_DEGREES: 175
OD_K1POWER_DIOPTERS: 44.00
OS_K2POWER_DIOPTERS: 45.50
OS_AXISANGLE_DEGREES: 85
OS_K1POWER_DIOPTERS: 44.25
OD_AXISANGLE_DEGREES: 71

## 2024-05-20 ASSESSMENT — VISUAL ACUITY
OS_CC: 20/40-1
OD_CC: 20/40-1

## 2024-05-20 ASSESSMENT — TONOMETRY
OS_IOP_MMHG: 11
OD_IOP_MMHG: 13

## 2024-05-21 ENCOUNTER — OFFICE VISIT (OUTPATIENT)
Dept: PHYSICAL THERAPY | Facility: CLINIC | Age: 84
End: 2024-05-21
Payer: COMMERCIAL

## 2024-05-21 ENCOUNTER — OFFICE VISIT (OUTPATIENT)
Dept: SPEECH THERAPY | Facility: CLINIC | Age: 84
End: 2024-05-21
Payer: COMMERCIAL

## 2024-05-21 DIAGNOSIS — G20.A1 PARKINSON'S DISEASE, UNSPECIFIED WHETHER DYSKINESIA PRESENT, UNSPECIFIED WHETHER MANIFESTATIONS FLUCTUATE: Primary | ICD-10-CM

## 2024-05-21 DIAGNOSIS — G20.A1 MODERATE DEMENTIA DUE TO PARKINSON'S DISEASE, UNSPECIFIED WHETHER BEHAVIORAL, PSYCHOTIC, OR MOOD DISTURBANCE OR ANXIETY (HCC): ICD-10-CM

## 2024-05-21 DIAGNOSIS — R48.8 OTHER SYMBOLIC DYSFUNCTIONS: Primary | ICD-10-CM

## 2024-05-21 DIAGNOSIS — F02.B0 MODERATE DEMENTIA DUE TO PARKINSON'S DISEASE, UNSPECIFIED WHETHER BEHAVIORAL, PSYCHOTIC, OR MOOD DISTURBANCE OR ANXIETY (HCC): ICD-10-CM

## 2024-05-21 DIAGNOSIS — R26.89 BALANCE DISORDER: ICD-10-CM

## 2024-05-21 PROCEDURE — 97530 THERAPEUTIC ACTIVITIES: CPT | Performed by: PHYSICAL THERAPIST

## 2024-05-21 PROCEDURE — 97112 NEUROMUSCULAR REEDUCATION: CPT | Performed by: PHYSICAL THERAPIST

## 2024-05-21 PROCEDURE — 92507 TX SP LANG VOICE COMM INDIV: CPT

## 2024-05-21 NOTE — LETTER
May 24, 2024    Joaquim Dickerson MD  [address] [place] [id number]    Patient: Robi Bello   YOB: 1940   Date of Visit: 2024     Encounter Diagnosis     ICD-10-CM    1. Parkinson's disease, unspecified whether dyskinesia present, unspecified whether manifestations fluctuate  G20.A1       2. Balance disorder  R26.89           Dear Dr. Dickerson:    Thank you for your recent referral of Robi Bello. Please review the attached evaluation summary from Robi's recent visit.     Please verify that you agree with the plan of care by signing the attached order.     If you have any questions or concerns, please do not hesitate to call.     I sincerely appreciate the opportunity to share in the care of one of your patients and hope to have another opportunity to work with you in the near future.       Sincerely,    Gerda Bolanos, PT      Referring Provider:      I certify that I have read the below Plan of Care and certify the need for these services furnished under this plan of treatment while under my care.                    Joaquim Dickerson MD  [address] [place] [id number]  Via Fax: [phone]          RE-Evaluation    Today's date: 2024  Patient name: Robi Belol  : 1940  MRN: [id number]  Referring provider: Joaquim Dickerson MD  Dx:   Encounter Diagnosis     ICD-10-CM    1. Parkinson's disease, unspecified whether dyskinesia present, unspecified whether manifestations fluctuate  G20.A1       2. Balance disorder  R26.89           Start Time: 1153  Stop Time: 1231  Total time in clinic (min): 38 minutes    Assessment:  Progress update performed today as patient plan of care has . Patient limited with endurance, balance and gait. AT this time he has made limited progress which is likely due to low back pain, memory and cognitive issues, decreased compliance with HEP and movement and slow processing. Due to progressive nature of disease process,  as well as on and off time with fluctuations of good days and bad days. Patient would benefit from another session of PT for testing in order to ensure scores obtained today are accurate and reflect his true functional status. Spoke with patients wife indicating results and benefits of increased activity outside of therapy, patient wife agrees noting patient isn't always safe at home. Agreed to increasing frequency of skilled therapy to 3x/week moving forward to help facilitate neuroplasticity and motor learning.        Impairments: abnormal coordination, abnormal gait, abnormal movement, activity intolerance, impaired balance, impaired physical strength, lacks appropriate home exercise program, safety issue and poor posture   Barriers to therapy: Limited transportation, history of migraines and Meniere disease  Understanding of Dx/Px/POC: good   Prognosis: good    Goals  STG:  Patient will improve 6MWT by 200 ft with RW demonstrating significant improvements in endurance  w/in 4 weeks (Not MET, but was able to complete without AD)  Patient will be able to perform STS without use of UE within 4 weeks to demonstrate improvements in functional strength and power. MET  Patient will be able to walk household distances (100ft or more) with SPC and supervision within 4 weeks to address his goal of walking more independently. MET  Patient will be independent with HEP within 4 weeks MET  LTG:  Patient will improve 6MWT by 400 ft demonstrating significant improvements in endurance  w/in 8 weeks  Patient will demonstrated improved LE strength and endurance by improved 5xSTS to 12s or less without the use of UE within 8 weeks  Patient will improve TUG to 12s or less indicating they are no longer at risk for increased falls 8 weeks  Patient will demonstrates significant improvements in dynamic balance by improving FGA score by 6 points or more within 8 weeks  Patient will improve gait speed to 1.0 m/s indicating they are no  longer at higher risk for falls within 8 weeks MET (4/25)    New Goals as of 4/25/24: further review at next visit   Patient will be able to perform all bed mobility independently with minimal verbal cues within 4 weeks. - MET  Patient will improve gait speed to 1.2 m/s indicating a speed needed to safely cross the street within 4 weeks.   Patient will be able to walk 1000ft or more during the 6MWT without an assistive device within 4 weeks to demonstrate improved endurance.     Plan  Patient would benefit from: skilled physical therapy  Referral necessary: No  Planned therapy interventions: home exercise program, graded motor, graded exercise, graded activity, gait training, flexibility, functional ROM exercises, community reintegration, ADL training, balance, motor coordination training, neuromuscular re-education, patient education, postural training, sensory integrative techniques, strengthening, stretching, therapeutic activities, therapeutic exercise, therapeutic training and transfer training  Frequency: 2x week  Duration in weeks: 8  Plan of Care beginning date: 3/25/2024  Plan of Care expiration date: 5/20/2024 please extend until next PT visit,  expected 05/23/2024 in order for further assess and evaluate.   Treatment plan discussed with: patient and family    Subjective: with complaints of back pain = center/top hasn't changed much just is always there.     Objective: See treatment diary below      Gait Assessment  03/25/24 03/28/24 04/25/24 05/21     6 Minute Walk Test (ft): Pre: HR 66  O2 99%  775 ft with RW  Post: HR 85 O2 98%  750 ft without RW   Post: HR 87 O2 99% 615ft     2 Minute Walk Test (ft): 225 ft with RW        Gait Speed (ft/s): 20 ft / 8 sec = 2.5 ft/s  0.76 m/s  20 ft / 6 sec = 1.02 m/s  10s     TUG NT    TUG Score:   TUG Manual Score:   TUG Cognitive Score: TUG Score: 26.75 sec (no AD)  TUG Cognitive Score: 28.41 sec (no AD), only able to perform cognitive tasks for first 10 sec  TUG  Score: 23 sec (No AD)  Tug COG Score: 26 cog (No AD) 24s W/AD  22a no AD      FGA (<22/30 increased fall risk) 14/30 19/30      Sit to Stand Assessment  5xSTS score (time): 14 sec   Use of B/L UE (pt did not stand up all the way)   40 sec no UE  W/UE 14s       Balance:         Modified Clinical Test of Sensory Interaction on Balance   NT  Firm surface:  EO  EC  FOAM surface  EO  EC Firm surface:  EO 30 sec   EC 30 sec   FOAM surface  EO 30 sec   EC 3 sec, 11 sec, 21 sec  Firm surface:  EO 30 sec   EC 30 sec   FOAM surface  EO 30 sec   EC 11sec, 30 sec  Firm surface:  EO  EC  FOAM surface  EO  EC     Activities Specific Balance Confidence Scale (ABC) score: 17.5 %  51.88%      Hand Function Assessment  Hole Peg Test Score: NT  NT             Short Term Goal Expiration Date:(04/22/2024)  Long Term Goal Expiration Date: (05/20/2024)  POC Expiration Date: (05/20/2024)    Visit count: 5 of 10; PN due 05/20/2024    POC expires Unit limit Auth Expiration date PT/OT/ST + Visit Limit?   05/20/2024 N/A N/A BOMN                           Visit/Unit Tracking  AUTH Status:  Date 03/25 03/28 04/02 04/04 04/09 04/11 04/16 04/18 04/25  PN 04/30 05/01 05/07 05/09   BOMN Used 1 2 3 4 5 6 7 8 9 1 2 3 4    Remaining  9 8 7 6 5 4 3 2 1 9 8 7 6     AUTH Status:  Date 05/14 05/16 05/21             BOMN Used 5 6 7              Remaining  5 4 3               Authorized (3/25/  Authorized (3/25/2024-12/31/2024)  Visits Requested Visits Authorized Visits Completed Visits Scheduled   [phone]         Manuals 5/21 05/09 05/14 05/16                                   Neuro Re-Ed    Pt education on reasons lightheaded symptoms may be happening and strategies and safety measures to use when feeling lightheaded.      Hurdles     Solo   Low  W/SPC  Non reciprocally   Fwdx4 laps    Lat  x 2 laps  Solo    Obstacles of varying width and height (pool noodles crossed, bolsters x2 and foam beam)    Fwd x 5 laps no UE   Lat x 4 laps    HKM   Solo   1/4  "track   Reciprocal arm swing   X 4 laps    Solo   1/4 track   Reciprocal arm swing     X 1 lap with demo   X2 laps no UE  X 2  laps w/UE    Dynamic balance    Solo   1/4 track   Bwd walking   X 3 laps     Solo   1/4 track   Lateral stepping   X 3 laps  Solo   1/4 track   Bwd walking   X 4 laps  Trunk ROT with physio ball ball toss   X5 ea   STS   B/L UE   1 x 10   Single UE  2x10   Cues for upright posture in standing and controlled descent    Stairs         Boxing         Daily maximal exercises         Diaphragmatic breathing         Bed mobility    Rolling   Max cues for technique to reach and cross leg towards side pt rolls to   X 5 each side     Sidelying <> Sit transfers   X 3   MinAx1      Open books         Blazepods    Seated on low mat table   2 blazepods on either side of pt   \"[place]\" ball lifts to hit blazepod either side   2 x 1:30 rounds   Trial 1: 8 hits   Trial 2: 12 hits      Trunk rotation    Solo   1/4 track   Passing red physioball with SPT with emphasis on trunk rotation and big UE reaching  X 10 each side     Supine   Lower trunk rotations   X 10 each side      Ther Ex    BP pre bike  135/80    Attempted recumbent unable         Bike    Nustep  L5 x 10 min for endurance training                                                             Ther Activity        Bed mobility                 Gait Training        Treadmill      1.0 mph  B/L UE   VC for increase step height and length     5min x2  HR 85   O2 96%   Level surface     CS in/out of clinic with cues to carry over big steps with use SPC  100ft x2    Modalities                                                                            "

## 2024-05-21 NOTE — PROGRESS NOTES
"Daily Speech Treatment Note    Today's date: 2024  Patient’s name: Robi Bello  : 1940  MRN: [id number]  Safety measures: PD, Fall Risk  Referring provider: Joaquim Dickerson MD    Encounter Diagnosis     ICD-10-CM    1. Other symbolic dysfunctions  R48.8       2. Moderate dementia due to Parkinson's disease, unspecified whether behavioral, psychotic, or mood disturbance or anxiety ([facility])  G20.A1     F02.B0             Visit Tracking:  Visit tracking:  Re-eval Due POC Expires Auth Expiration Date ST Visit Limit   2024 BOMN                           Visit/Unit Tracking  AUTH Status:  Date 2024   BOMN Used 1 2 3 4 5    Remaining  BOMN BOMN BOMN BOMN BOMN       Subjective/Behavioral:  -\"I guess I am not getting this.\" (Patient reported this when attempting the alphabet sorting task.  In order to help, Clinician suggested that Patient put all the cards onto the table and then sort them starting at A and working his way toward Z.  Patient also benefited from singing the alphabet song.     Objective/Assessment:  -Reviewed testing results and goals in plan care with patient. Patient is in agreement at this time.      Short-term goals:  1. Patient will complete word generation tasks (e.g., categorical naming, sentence/phrase completion, word deduction, definitions, etc.) at 75% accuracy with min semantic and phonemic cues from clinician as needed to target the maintenance of his/her expressive vocabulary, as well as promote socialization and safety.    To target thought organization: Patient was presented with 26 letter cards and was instructed to sort the cards from A to Z.  At first, Patient attempted to sort cards as they were in his hand (e.g. putting M toward the middle of the table, putting Y toward then end of the table), but this appeared to confuse Patient.  Once Patient had all the cards on the table, he was better able " "to sort into the appropriate sequence.  Singing the alphabet song also appeared to help.  Patient completed this task in 20/26 (77%) opportunities independently, increasing to 26/26 (100%) opportunities given mod verbal cues.  Patient was noted to perseverate on \"w\" throughout task.     Once cards were sorted, Patient was then given pictures and was instructed to place the picture on the letter that matched (e.g. apple - a, boat - b, cat - c).  He completed this task in 24/26 (92%) opportunities independently, increasing to 26/26 (100%) opportunities given min verbal cues.    2. Patient will complete cognitive-linguistic activities (e.g., verbal and visual problem solving scenarios, sequencing, reasoning, etc.) at 75% accuracy with min semantic cues from clinician as needed to target the maintenance of his/her cognitive-linguistic functioning, as well as promote safety and overall QOL.    To target temporal orientation: Patient was presented with calendars and was asked questions regarding calendar navigation.  He completed this task in 8/18 (44%) opportunities independently, increasing to 18/18 (100%) opportunities given mod to max verbal and visual cues.  It should be noted that Patient demonstrated the most difficulty with more simple tasks (e.g. point to the month, point to the year) versus more complex directives (e.g. point to the last Wednesday of the month, point to all the [employer] holidays).        Plan:  -Continue with current plan of care.   "

## 2024-05-21 NOTE — PROGRESS NOTES
RE-Evaluation    Today's date: 2024  Patient name: Robi Bello  : 1940  MRN: [id number]  Referring provider: Joaquim Dickerson MD  Dx:   Encounter Diagnosis     ICD-10-CM    1. Parkinson's disease, unspecified whether dyskinesia present, unspecified whether manifestations fluctuate  G20.A1       2. Balance disorder  R26.89           Start Time: 1153  Stop Time: 1231  Total time in clinic (min): 38 minutes    Assessment:  Progress update performed today as patient plan of care has . Patient limited with endurance, balance and gait. AT this time he has made limited progress which is likely due to low back pain, memory and cognitive issues, decreased compliance with HEP and movement and slow processing. Due to progressive nature of disease process, as well as on and off time with fluctuations of good days and bad days. Patient would benefit from another session of PT for testing in order to ensure scores obtained today are accurate and reflect his true functional status. Spoke with patients wife indicating results and benefits of increased activity outside of therapy, patient wife agrees noting patient isn't always safe at home. Agreed to increasing frequency of skilled therapy to 3x/week moving forward to help facilitate neuroplasticity and motor learning.        Impairments: abnormal coordination, abnormal gait, abnormal movement, activity intolerance, impaired balance, impaired physical strength, lacks appropriate home exercise program, safety issue and poor posture   Barriers to therapy: Limited transportation, history of migraines and Meniere disease  Understanding of Dx/Px/POC: good   Prognosis: good    Goals  STG:  Patient will improve 6MWT by 200 ft with RW demonstrating significant improvements in endurance  w/in 4 weeks (Not MET, but was able to complete without AD)  Patient will be able to perform STS without use of UE within 4 weeks to demonstrate improvements in functional strength  and power. MET  Patient will be able to walk household distances (100ft or more) with SPC and supervision within 4 weeks to address his goal of walking more independently. MET  Patient will be independent with HEP within 4 weeks MET  LTG:  Patient will improve 6MWT by 400 ft demonstrating significant improvements in endurance  w/in 8 weeks  Patient will demonstrated improved LE strength and endurance by improved 5xSTS to 12s or less without the use of UE within 8 weeks  Patient will improve TUG to 12s or less indicating they are no longer at risk for increased falls 8 weeks  Patient will demonstrates significant improvements in dynamic balance by improving FGA score by 6 points or more within 8 weeks  Patient will improve gait speed to 1.0 m/s indicating they are no longer at higher risk for falls within 8 weeks MET (4/25)    New Goals as of 4/25/24: further review at next visit   Patient will be able to perform all bed mobility independently with minimal verbal cues within 4 weeks. - MET  Patient will improve gait speed to 1.2 m/s indicating a speed needed to safely cross the street within 4 weeks.   Patient will be able to walk 1000ft or more during the 6MWT without an assistive device within 4 weeks to demonstrate improved endurance.     Plan  Patient would benefit from: skilled physical therapy  Referral necessary: No  Planned therapy interventions: home exercise program, graded motor, graded exercise, graded activity, gait training, flexibility, functional ROM exercises, community reintegration, ADL training, balance, motor coordination training, neuromuscular re-education, patient education, postural training, sensory integrative techniques, strengthening, stretching, therapeutic activities, therapeutic exercise, therapeutic training and transfer training  Frequency: 2x week  Duration in weeks: 8  Plan of Care beginning date: 3/25/2024  Plan of Care expiration date: 5/20/2024 please extend until next PT visit,   expected 05/23/2024 in order for further assess and evaluate.   Treatment plan discussed with: patient and family    Subjective: with complaints of back pain = center/top hasn't changed much just is always there.     Objective: See treatment diary below      Gait Assessment  03/25/24 03/28/24 04/25/24 05/21     6 Minute Walk Test (ft): Pre: HR 66  O2 99%  775 ft with RW  Post: HR 85 O2 98%  750 ft without RW   Post: HR 87 O2 99% 615ft     2 Minute Walk Test (ft): 225 ft with RW        Gait Speed (ft/s): 20 ft / 8 sec = 2.5 ft/s  0.76 m/s  20 ft / 6 sec = 1.02 m/s  10s     TUG NT    TUG Score:   TUG Manual Score:   TUG Cognitive Score: TUG Score: 26.75 sec (no AD)  TUG Cognitive Score: 28.41 sec (no AD), only able to perform cognitive tasks for first 10 sec  TUG Score: 23 sec (No AD)  Tug COG Score: 26 cog (No AD) 24s W/AD  22a no AD      FGA (<22/30 increased fall risk) 14/30 19/30      Sit to Stand Assessment  5xSTS score (time): 14 sec   Use of B/L UE (pt did not stand up all the way)   40 sec no UE  W/UE 14s       Balance:         Modified Clinical Test of Sensory Interaction on Balance   NT  Firm surface:  EO  EC  FOAM surface  EO  EC Firm surface:  EO 30 sec   EC 30 sec   FOAM surface  EO 30 sec   EC 3 sec, 11 sec, 21 sec  Firm surface:  EO 30 sec   EC 30 sec   FOAM surface  EO 30 sec   EC 11sec, 30 sec  Firm surface:  EO  EC  FOAM surface  EO  EC     Activities Specific Balance Confidence Scale (ABC) score: 17.5 %  51.88%      Hand Function Assessment  Hole Peg Test Score: NT  NT             Short Term Goal Expiration Date:(04/22/2024)  Long Term Goal Expiration Date: (05/20/2024)  POC Expiration Date: (05/20/2024)    Visit count: 5 of 10; PN due 05/20/2024    POC expires Unit limit Auth Expiration date PT/OT/ST + Visit Limit?   05/20/2024 N/A N/A BOMN                           Visit/Unit Tracking  AUTH Status:  Date 03/25 03/28 04/02 04/04 04/09 04/11 04/16 04/18 04/25  PN 04/30 05/01 05/07 05/09   BOMN  "Used 1 2 3 4 5 6 7 8 9 1 2 3 4    Remaining  9 8 7 6 5 4 3 2 1 9 8 7 6     AUTH Status:  Date 05/14 05/16 05/21             BOMN Used 5 6 7              Remaining  5 4 3               Authorized (3/25/  Authorized (3/25/2024-12/31/2024)  Visits Requested Visits Authorized Visits Completed Visits Scheduled   [phone]         Manuals 5/21 05/09 05/14 05/16                                   Neuro Re-Ed    Pt education on reasons lightheaded symptoms may be happening and strategies and safety measures to use when feeling lightheaded.      Hurdles     Solo   Low  W/SPC  Non reciprocally   Fwdx4 laps    Lat  x 2 laps  Solo    Obstacles of varying width and height (pool noodles crossed, bolsters x2 and foam beam)    Fwd x 5 laps no UE   Lat x 4 laps    HKM   Solo   1/4 track   Reciprocal arm swing   X 4 laps    Solo   1/4 track   Reciprocal arm swing     X 1 lap with demo   X2 laps no UE  X 2  laps w/UE    Dynamic balance    Solo   1/4 track   Bwd walking   X 3 laps     Solo   1/4 track   Lateral stepping   X 3 laps  Solo   1/4 track   Bwd walking   X 4 laps  Trunk ROT with physio ball ball toss   X5 ea   STS   B/L UE   1 x 10   Single UE  2x10   Cues for upright posture in standing and controlled descent    Stairs         Boxing         Daily maximal exercises         Diaphragmatic breathing         Bed mobility    Rolling   Max cues for technique to reach and cross leg towards side pt rolls to   X 5 each side     Sidelying <> Sit transfers   X 3   MinAx1      Open books         Blazepods    Seated on low mat table   2 blazepods on either side of pt   \"[place]\" ball lifts to hit blazepod either side   2 x 1:30 rounds   Trial 1: 8 hits   Trial 2: 12 hits      Trunk rotation    Solo   1/4 track   Passing red physioball with SPT with emphasis on trunk rotation and big UE reaching  X 10 each side     Supine   Lower trunk rotations   X 10 each side      Ther Ex    BP pre bike  135/80    Attempted recumbent unable       "   Bike    Nustep  L5 x 10 min for endurance training                                                             Ther Activity        Bed mobility                 Gait Training        Treadmill      1.0 mph  B/L UE   VC for increase step height and length     5min x2  HR 85   O2 96%   Level surface     CS in/out of clinic with cues to carry over big steps with use SPC  100ft x2    Modalities

## 2024-05-23 ENCOUNTER — OFFICE VISIT (OUTPATIENT)
Dept: PHYSICAL THERAPY | Facility: CLINIC | Age: 84
End: 2024-05-23
Payer: COMMERCIAL

## 2024-05-23 DIAGNOSIS — R26.89 BALANCE DISORDER: ICD-10-CM

## 2024-05-23 DIAGNOSIS — G20.A1 PARKINSON'S DISEASE, UNSPECIFIED WHETHER DYSKINESIA PRESENT, UNSPECIFIED WHETHER MANIFESTATIONS FLUCTUATE: Primary | ICD-10-CM

## 2024-05-23 PROCEDURE — 97112 NEUROMUSCULAR REEDUCATION: CPT | Performed by: PHYSICAL THERAPIST

## 2024-05-23 PROCEDURE — 97530 THERAPEUTIC ACTIVITIES: CPT | Performed by: PHYSICAL THERAPIST

## 2024-05-23 NOTE — PROGRESS NOTES
Re-Evaluation     Today's date: 2024  Patient name: Robi Bello  : 1940  MRN: [id number]  Referring provider: Joaquim Dickerson MD  Dx:   Encounter Diagnosis     ICD-10-CM    1. Parkinson's disease, unspecified whether dyskinesia present, unspecified whether manifestations fluctuate  G20.A1       2. Balance disorder  R26.89           Start Time: 1105  Stop Time: 1145  Total time in clinic (min): 40 minutes      Assessment: Tolerated treatment well. Patient demonstrated fatigue post treatment and would benefit from continued PT  With confusion upon entering clinic patient initially unsure what he was coming to PT for but remembered after reminders. Due to progressive nature of movement disorder and fluctuating on off times testing was completed again today. Patient made some improvements compared to last session and appeared more confident ambulating without AD today.  Demonstrated improved TUG without AD and with dual task challenge, although score still suggest high fall risk. He continues to demonstrate antalgic gait patter, short step length decreased gait speed and endurance and remains at higher risk for falls. He continues to be a good candidate for skilled therapy to further maximize functional mobility and slow progression of disease process. Limited progress in therapy could be attributed to lack of HEP performance, low back pain, dementia and slow cognitive processing. He would benefit from 3x/week vs 2x/week as patient mostly sedentary at home due to decreased safety awareness and slow processing.      Impairments: abnormal coordination, abnormal gait, abnormal movement, activity intolerance, impaired balance, impaired physical strength, lacks appropriate home exercise program, safety issue and poor posture   Barriers to therapy: Limited transportation, history of migraines and Meniere disease  Understanding of Dx/Px/POC: good   Prognosis: good    LTG:  Patient will improve 6MWT by 400  ft demonstrating significant improvements in endurance  w/in 8 weeks - In progress   Patient will demonstrated improved LE strength and endurance by improved 5xSTS to 12s or less without the use of UE within 8 weeks - nOT ME but WNL   Patient will improve TUG to 12s or less indicating they are no longer at risk for increased falls 8 weeks - NOT MET   Patient will demonstrates significant improvements in dynamic balance by improving FGA score by 6 points or more within 8 weeks -NOT MET   Patient will improve gait speed to 1.0 m/s indicating they are no longer at higher risk for falls within 8 weeks MET (4/25)    New Goals as of 4/25/24:  Patient will be able to perform all bed mobility independently with minimal verbal cues within 4 weeks. - MET  Patient will improve gait speed to 1.2 m/s indicating a speed needed to safely cross the street within 4 weeks. - NOT MET   Patient will be able to walk 1000ft or more during the 6MWT without an assistive device within 4 weeks to demonstrate improved endurance. - NOT MET     NEW GOALS AS OF 05/23/2024  STG:  Patient will achieve gait speed of 1.0 m/s to demonstrate community ambulation within 4 weeks   Patient will be complaint with increasing activity level at home for improved endurance within 4 weeks     LTG  Patient will increased 6 MWT by 200ft or great within 8 weeks   Patient will improve  FGA by 4 points or greater demonstrating significant improvements in balance within 8 weeks     Plan  Patient would benefit from: skilled physical therapy  Referral necessary: No  Planned therapy interventions: home exercise program, graded motor, graded exercise, graded activity, gait training, flexibility, functional ROM exercises, community reintegration, ADL training, balance, motor coordination training, neuromuscular re-education, patient education, postural training, sensory integrative techniques, strengthening, stretching, therapeutic activities, therapeutic exercise,  "therapeutic training and transfer training  Frequency: 3x week  Duration in weeks: 8  Plan of Care beginning date: 05/23/2024  Plan of Care expiration date: 07/18/2024  Treatment plan discussed with: patient and family    Subjective: Feeling okay today \"I still can't remember much\"      Objective: See treatment diary below    Gait Assessment  03/25/24 03/28/24 04/25/24 05/21 05/23    6 Minute Walk Test (ft): Pre: HR 66  O2 99%  775 ft with RW  Post: HR 85 O2 98%  750 ft without RW   Post: HR 87 O2 99% 615ft w/SPC 700ft  w/o SPC    HR 76  O2 97%     2 Minute Walk Test (ft): 225 ft with RW        Gait Speed (ft/s): 20 ft / 8 sec = 2.5 ft/s  0.76 m/s  20 ft / 6 sec = 1.02 m/s  20ft/10s w/SPC    20ft/8.3s w/SPC = 2.4ft/s  0.73 m/s   20ft/7.3s w/o SPC=2.7ft/s  0.83 m/s    TUG NT    TUG Score:   TUG Manual Score:   TUG Cognitive Score: TUG Score: 26.75 sec (no AD)  TUG Cognitive Score: 28.41 sec (no AD), only able to perform cognitive tasks for first 10 sec  TUG Score: 23 sec (No AD)  Tug COG Score: 26 cog (No AD) 24s W/AD  22a no AD  17.7s W/AD  18.0s W/O AD  W/cog task: 20.8s with inconsistent count and passes target    FGA (<22/30 increased fall risk) 14/30 19/30 16/30    Sit to Stand Assessment  5xSTS score (time): 14 sec   Use of B/L UE (pt did not stand up all the way)   40 sec no UE  W/UE 14s   14.5s w/UE    Balance:         Modified Clinical Test of Sensory Interaction on Balance   NT  Firm surface:  EO  EC  FOAM surface  EO  EC Firm surface:  EO 30 sec   EC 30 sec   FOAM surface  EO 30 sec   EC 3 sec, 11 sec, 21 sec  Firm surface:  EO 30 sec   EC 30 sec   FOAM surface  EO 30 sec   EC 11sec, 30 sec                Short Term Goal Expiration Date:(06/20/2024  Long Term Goal Expiration Date: (07/18/2024)  POC Expiration Date: (07/18/2024)    Visit count: 1 of 10; PN due 06/20/2024    POC expires Unit limit Auth Expiration date PT/OT/ST + Visit Limit?   05/20/2024 N/A N/A BOMN                           Visit/Unit " "Tracking  AUTH Status:  Date 03/25 03/28 04/02 04/04 04/09 04/11 04/16 04/18 04/25  PN 04/30 05/01 05/07 05/09   BOMN Used 1 2 3 4 5 6 7 8 9 1 2 3 4    Remaining  9 8 7 6 5 4 3 2 1 9 8 7 6     AUTH Status:  Date 05/14 05/16 05/21 05/23            BOMN Used 5 6 7 1             Remaining  5 4 3 9              Auth  Authorized (3/25/2024-12/31/2024)  Visits Requested Visits Authorized Visits Completed Visits Scheduled   [phone]         Manuals 5/21 05/23 05/09 05/14 05/16                                   Neuro Re-Ed    Pt education on reasons lightheaded symptoms may be happening and strategies and safety measures to use when feeling lightheaded.      Hurdles     Solo   Low  W/SPC  Non reciprocally   Fwdx4 laps    Lat  x 2 laps  Solo    Obstacles of varying width and height (pool noodles crossed, bolsters x2 and foam beam)    Fwd x 5 laps no UE   Lat x 4 laps    HKM   Solo   1/4 track   Reciprocal arm swing   X 4 laps    Solo   1/4 track   Reciprocal arm swing     X 1 lap with demo   X2 laps no UE  X 2  laps w/UE    Dynamic balance    Solo   1/4 track   Bwd walking   X 3 laps     Solo   1/4 track   Lateral stepping   X 3 laps  Solo   1/4 track   Bwd walking   X 4 laps  Trunk ROT with physio ball ball toss   X5 ea   STS   B/L UE   1 x 10   Single UE  2x10   Cues for upright posture in standing and controlled descent    Stairs         Boxing         Daily maximal exercises         Diaphragmatic breathing         Bed mobility    Rolling   Max cues for technique to reach and cross leg towards side pt rolls to   X 5 each side     Sidelying <> Sit transfers   X 3   MinAx1      Open books         Blazepods    Seated on low mat table   2 blazepods on either side of pt   \"[place]allenge\" ball lifts to hit blazepod either side   2 x 1:30 rounds   Trial 1: 8 hits   Trial 2: 12 hits      Trunk rotation    Solo   1/4 track   Passing red physioball with SPT with emphasis on trunk rotation and big UE reaching  X 10 each side     Supine "   Lower trunk rotations   X 10 each side      Ther Ex    BP pre bike  135/80    Attempted recumbent unable         Bike    Nustep  L5 x 10 min for endurance training                                                             Ther Activity        Bed mobility                 Gait Training        Treadmill      1.0 mph  B/L UE   VC for increase step height and length     5min x2  HR 85   O2 96%   Level surface     CS in/out of clinic with cues to carry over big steps with use SPC  100ft x2    Modalities

## 2024-05-23 NOTE — LETTER
May 24, 2024    Joaquim Dickerson MD  [address] [place] [id number]    Patient: Robi Bello   YOB: 1940   Date of Visit: 2024     Encounter Diagnosis     ICD-10-CM    1. Parkinson's disease, unspecified whether dyskinesia present, unspecified whether manifestations fluctuate  G20.A1       2. Balance disorder  R26.89           Dear Dr. Dickerson:    Thank you for your recent referral of Robi Bello. Please review the attached evaluation summary from Robi's recent visit.     Please verify that you agree with the plan of care by signing the attached order.     If you have any questions or concerns, please do not hesitate to call.     I sincerely appreciate the opportunity to share in the care of one of your patients and hope to have another opportunity to work with you in the near future.       Sincerely,    Gerda Bolanos, PT      Referring Provider:      I certify that I have read the below Plan of Care and certify the need for these services furnished under this plan of treatment while under my care.                    Joaquim Dickerson MD  [address] [place] [id number]  Via Fax: [phone]          Re-Evaluation     Today's date: 2024  Patient name: Robi Bello  : 1940  MRN: [id number]  Referring provider: Joaquim Dickerson MD  Dx:   Encounter Diagnosis     ICD-10-CM    1. Parkinson's disease, unspecified whether dyskinesia present, unspecified whether manifestations fluctuate  G20.A1       2. Balance disorder  R26.89           Start Time: 1105  Stop Time: 1145  Total time in clinic (min): 40 minutes      Assessment: Tolerated treatment well. Patient demonstrated fatigue post treatment and would benefit from continued PT  With confusion upon entering clinic patient initially unsure what he was coming to PT for but remembered after reminders. Due to progressive nature of movement disorder and fluctuating on off times testing was completed again  today. Patient made some improvements compared to last session and appeared more confident ambulating without AD today.  Demonstrated improved TUG without AD and with dual task challenge, although score still suggest high fall risk. He continues to demonstrate antalgic gait patter, short step length decreased gait speed and endurance and remains at higher risk for falls. He continues to be a good candidate for skilled therapy to further maximize functional mobility and slow progression of disease process. Limited progress in therapy could be attributed to lack of HEP performance, low back pain, dementia and slow cognitive processing. He would benefit from 3x/week vs 2x/week as patient mostly sedentary at home due to decreased safety awareness and slow processing.      Impairments: abnormal coordination, abnormal gait, abnormal movement, activity intolerance, impaired balance, impaired physical strength, lacks appropriate home exercise program, safety issue and poor posture   Barriers to therapy: Limited transportation, history of migraines and Meniere disease  Understanding of Dx/Px/POC: good   Prognosis: good    LTG:  Patient will improve 6MWT by 400 ft demonstrating significant improvements in endurance  w/in 8 weeks - In progress   Patient will demonstrated improved LE strength and endurance by improved 5xSTS to 12s or less without the use of UE within 8 weeks - nOT ME but WNL   Patient will improve TUG to 12s or less indicating they are no longer at risk for increased falls 8 weeks - NOT MET   Patient will demonstrates significant improvements in dynamic balance by improving FGA score by 6 points or more within 8 weeks -NOT MET   Patient will improve gait speed to 1.0 m/s indicating they are no longer at higher risk for falls within 8 weeks MET (4/25)    New Goals as of 4/25/24:  Patient will be able to perform all bed mobility independently with minimal verbal cues within 4 weeks. - MET  Patient will improve  "gait speed to 1.2 m/s indicating a speed needed to safely cross the street within 4 weeks. - NOT MET   Patient will be able to walk 1000ft or more during the 6MWT without an assistive device within 4 weeks to demonstrate improved endurance. - NOT MET     NEW GOALS AS OF 05/23/2024  STG:  Patient will achieve gait speed of 1.0 m/s to demonstrate community ambulation within 4 weeks   Patient will be complaint with increasing activity level at home for improved endurance within 4 weeks     LTG  Patient will increased 6 MWT by 200ft or great within 8 weeks   Patient will improve  FGA by 4 points or greater demonstrating significant improvements in balance within 8 weeks     Plan  Patient would benefit from: skilled physical therapy  Referral necessary: No  Planned therapy interventions: home exercise program, graded motor, graded exercise, graded activity, gait training, flexibility, functional ROM exercises, community reintegration, ADL training, balance, motor coordination training, neuromuscular re-education, patient education, postural training, sensory integrative techniques, strengthening, stretching, therapeutic activities, therapeutic exercise, therapeutic training and transfer training  Frequency: 3x week  Duration in weeks: 8  Plan of Care beginning date: 05/23/2024  Plan of Care expiration date: 07/18/2024  Treatment plan discussed with: patient and family    Subjective: Feeling okay today \"I still can't remember much\"      Objective: See treatment diary below    Gait Assessment  03/25/24 03/28/24 04/25/24 05/21 05/23    6 Minute Walk Test (ft): Pre: HR 66  O2 99%  775 ft with RW  Post: HR 85 O2 98%  750 ft without RW   Post: HR 87 O2 99% 615ft w/SPC 700ft  w/o SPC    HR 76  O2 97%     2 Minute Walk Test (ft): 225 ft with RW        Gait Speed (ft/s): 20 ft / 8 sec = 2.5 ft/s  0.76 m/s  20 ft / 6 sec = 1.02 m/s  20ft/10s w/SPC    20ft/8.3s w/SPC = 2.4ft/s  0.73 m/s   20ft/7.3s w/o SPC=2.7ft/s  0.83 m/s    TUG " NT    TUG Score:   TUG Manual Score:   TUG Cognitive Score: TUG Score: 26.75 sec (no AD)  TUG Cognitive Score: 28.41 sec (no AD), only able to perform cognitive tasks for first 10 sec  TUG Score: 23 sec (No AD)  Tug COG Score: 26 cog (No AD) 24s W/AD  22a no AD  17.7s W/AD  18.0s W/O AD  W/cog task: 20.8s with inconsistent count and passes target    FGA (<22/30 increased fall risk) 14/30 19/30 16/30    Sit to Stand Assessment  5xSTS score (time): 14 sec   Use of B/L UE (pt did not stand up all the way)   40 sec no UE  W/UE 14s   14.5s w/UE    Balance:         Modified Clinical Test of Sensory Interaction on Balance   NT  Firm surface:  EO  EC  FOAM surface  EO  EC Firm surface:  EO 30 sec   EC 30 sec   FOAM surface  EO 30 sec   EC 3 sec, 11 sec, 21 sec  Firm surface:  EO 30 sec   EC 30 sec   FOAM surface  EO 30 sec   EC 11sec, 30 sec                Short Term Goal Expiration Date:(06/20/2024  Long Term Goal Expiration Date: (07/18/2024)  POC Expiration Date: (07/18/2024)    Visit count: 1 of 10; PN due 06/20/2024    POC expires Unit limit Auth Expiration date PT/OT/ST + Visit Limit?   05/20/2024 N/A N/A BOMN                           Visit/Unit Tracking  AUTH Status:  Date 03/25 03/28 04/02 04/04 04/09 04/11 04/16 04/18 04/25  PN 04/30 05/01 05/07 05/09   BOMN Used 1 2 3 4 5 6 7 8 9 1 2 3 4    Remaining  9 8 7 6 5 4 3 2 1 9 8 7 6     AUTH Status:  Date 05/14 05/16 05/21 05/23            BOMN Used 5 6 7 1             Remaining  5 4 3 9              Auth  Authorized (3/25/2024-12/31/2024)  Visits Requested Visits Authorized Visits Completed Visits Scheduled   [phone] 17 10         Manuals 5/21 05/23 05/09 05/14 05/16                                   Neuro Re-Ed    Pt education on reasons lightheaded symptoms may be happening and strategies and safety measures to use when feeling lightheaded.      Hurdles     Solo   Low  W/SPC  Non reciprocally   Fwdx4 laps    Lat  x 2 laps  Solo    Obstacles of varying width and  "height (pool noodles crossed, bolsters x2 and foam beam)    Fwd x 5 laps no UE   Lat x 4 laps    HKM   Solo   1/4 track   Reciprocal arm swing   X 4 laps    Solo   1/4 track   Reciprocal arm swing     X 1 lap with demo   X2 laps no UE  X 2  laps w/UE    Dynamic balance    Solo   1/4 track   Bwd walking   X 3 laps     Solo   1/4 track   Lateral stepping   X 3 laps  Solo   1/4 track   Bwd walking   X 4 laps  Trunk ROT with physio ball ball toss   X5 ea   STS   B/L UE   1 x 10   Single UE  2x10   Cues for upright posture in standing and controlled descent    Stairs         Boxing         Daily maximal exercises         Diaphragmatic breathing         Bed mobility    Rolling   Max cues for technique to reach and cross leg towards side pt rolls to   X 5 each side     Sidelying <> Sit transfers   X 3   MinAx1      Open books         Blazepods    Seated on low mat table   2 blazepods on either side of pt   \"[place]\" ball lifts to hit blazepod either side   2 x 1:30 rounds   Trial 1: 8 hits   Trial 2: 12 hits      Trunk rotation    Solo   1/4 track   Passing red physioball with SPT with emphasis on trunk rotation and big UE reaching  X 10 each side     Supine   Lower trunk rotations   X 10 each side      Ther Ex    BP pre bike  135/80    Attempted recumbent unable         Bike    Nustep  L5 x 10 min for endurance training                                                             Ther Activity        Bed mobility                 Gait Training        Treadmill      1.0 mph  B/L UE   VC for increase step height and length     5min x2  HR 85   O2 96%   Level surface     CS in/out of clinic with cues to carry over big steps with use SPC  100ft x2    Modalities                                                                              "

## 2024-05-28 ENCOUNTER — OFFICE VISIT (OUTPATIENT)
Dept: PHYSICAL THERAPY | Facility: CLINIC | Age: 84
End: 2024-05-28
Payer: COMMERCIAL

## 2024-05-28 DIAGNOSIS — R26.89 BALANCE DISORDER: ICD-10-CM

## 2024-05-28 DIAGNOSIS — G20.A1 PARKINSON'S DISEASE, UNSPECIFIED WHETHER DYSKINESIA PRESENT, UNSPECIFIED WHETHER MANIFESTATIONS FLUCTUATE: Primary | ICD-10-CM

## 2024-05-28 PROCEDURE — 97116 GAIT TRAINING THERAPY: CPT | Performed by: PHYSICAL THERAPIST

## 2024-05-28 PROCEDURE — 97110 THERAPEUTIC EXERCISES: CPT | Performed by: PHYSICAL THERAPIST

## 2024-05-28 NOTE — PROGRESS NOTES
Daily Note     Today's date: 2024  Patient name: Robi Bello  : 1940  MRN: [id number]  Referring provider: Joaquim Dickerson MD  Dx:   Encounter Diagnosis     ICD-10-CM    1. Parkinson's disease, unspecified whether dyskinesia present, unspecified whether manifestations fluctuate  G20.A1       2. Balance disorder  R26.89           Start Time: 1100  Stop Time: 1145  Total time in clinic (min): 45 minutes    Subjective: patient reports he is feeling good today. Didn't do much over the weekend.       Objective: See treatment diary below      Assessment: Tolerated treatment well. Patient demonstrated fatigue post treatment and would benefit from continued PT  Patient participated in physical session today focusing on endurance, gait training, balance.  Demonstrated challenge with sequencing and following simple instructions requires cues and redirection as well as VC;s for motor planning to carry out task. Attempted goal driven activities of turning and SPT with good success with increased frequency.  With cog fatigue at end of session. Patient will continue to benefit from skilled therapy for continued focus on  to maximize safety, functional mobility to improve QOL       Plan: Continue per plan of care.      Short Term Goal Expiration Date:(2024  Long Term Goal Expiration Date: (2024)  POC Expiration Date: (2024)    Visit count: 1 of 10; PN due 2024    POC expires Unit limit Auth Expiration date PT/OT/ST + Visit Limit?   2024 N/A N/A BOMN                           Visit/Unit Tracking  AUTH Status:  Date 03/25 03/28 04/02 04/04 04/09 04/11 04/16 04/18 04/25  PN    BOMN Used 1 2 3 4 5 6 7 8 9 1 2 3 4    Remaining  9 8 7 6 5 4 3 2 1 9 8 7 6     AUTH Status:  Date 05/14 05/16 05/21 05/23 05/28           BOMN Used 5 6 7 1 2            Remaining  5 4 3 9 8             Auth  Authorized (3/25/2024-2024)  Visits Requested Visits Authorized Visits  Completed Visits Scheduled   [phone] 18 [phone]         Manuals 5/21 05/23 5/28                                     Neuro Re-Ed         Hurdles         HKM        Dynamic balance    solo  Turning around cones     STS        Stairs         Boxing         Daily maximal exercises         Diaphragmatic breathing         Bed mobility         Open books         Blazepods         Trunk rotation         Ther Ex        Bike                                                                Ther Activity   SPT tansfer w/o AD solo  X 5 laps      Bed mobility                 Gait Training        Treadmill    Solo step   1.2 mph  B/L UE   X 10 min      Level surface    Solo  Fwd no AD       Modalities

## 2024-05-29 NOTE — PROGRESS NOTES
Daily Speech Treatment Note    Today's date: 2024  Patient’s name: Robi Bello  : 1940  MRN: [id number]  Safety measures: PD, Fall Risk  Referring provider: Joaquim Dickerson MD    Encounter Diagnosis     ICD-10-CM    1. Other symbolic dysfunctions  R48.8       2. Moderate dementia due to Parkinson's disease, unspecified whether behavioral, psychotic, or mood disturbance or anxiety ([facility])  G20.A1     F02.B0             Visit Tracking:  Visit tracking:  Re-eval Due POC Expires Auth Expiration Date ST Visit Limit   2024 BOMN                           Visit/Unit Tracking  AUTH Status:  Date 2024   BOMN Used 1 2 3 4 5 6    Remaining  BOMN BOMN BOMN BOMN BOMN BOMN       Subjective/Behavioral:  Patient with no present complaints.    Objective/Assessment:  -See goals targeted below during today's treatment session.      Short-term goals:  1. Patient will complete word generation tasks (e.g., categorical naming, sentence/phrase completion, word deduction, definitions, etc.) at 75% accuracy with min semantic and phonemic cues from clinician as needed to target the maintenance of his/her expressive vocabulary, as well as promote socialization and safety.      2. Patient will complete cognitive-linguistic activities (e.g., verbal and visual problem solving scenarios, sequencing, reasoning, etc.) at 75% accuracy with min semantic cues from clinician as needed to target the maintenance of his/her cognitive-linguistic functioning, as well as promote safety and overall QOL.  Patient was shown a series of objects/animals on a page(4 to 6 items). The patient named each object and created a story to include each item. He then committed details to memory, repeating story twice. The picture was taken away and questions were asked to test recall of information. Patient completed task with 80% accuracy, with moderate cueing.      Patient was read a brief story (large paragraph) about [employer]. He was then asked a series of questions about what was just read. - Completed with 35% accuracy. Missed questions were reviewed and the paragraph was read a second time. He was again asked the same series of questions, answering with 80% accuracy (10 out of 10 questions correct).  Patient benefited from repetition as a strategy for recall. Strategies for memory recall and context clues were reviewed.      Plan:  -Continue with current plan of care.

## 2024-05-30 ENCOUNTER — OFFICE VISIT (OUTPATIENT)
Dept: SPEECH THERAPY | Facility: CLINIC | Age: 84
End: 2024-05-30
Payer: COMMERCIAL

## 2024-05-30 ENCOUNTER — OFFICE VISIT (OUTPATIENT)
Dept: PHYSICAL THERAPY | Facility: CLINIC | Age: 84
End: 2024-05-30
Payer: COMMERCIAL

## 2024-05-30 DIAGNOSIS — R26.89 BALANCE DISORDER: ICD-10-CM

## 2024-05-30 DIAGNOSIS — G20.A1 PARKINSON'S DISEASE, UNSPECIFIED WHETHER DYSKINESIA PRESENT, UNSPECIFIED WHETHER MANIFESTATIONS FLUCTUATE: Primary | ICD-10-CM

## 2024-05-30 DIAGNOSIS — R48.8 OTHER SYMBOLIC DYSFUNCTIONS: Primary | ICD-10-CM

## 2024-05-30 DIAGNOSIS — F02.B0 MODERATE DEMENTIA DUE TO PARKINSON'S DISEASE, UNSPECIFIED WHETHER BEHAVIORAL, PSYCHOTIC, OR MOOD DISTURBANCE OR ANXIETY (HCC): ICD-10-CM

## 2024-05-30 DIAGNOSIS — G20.A1 MODERATE DEMENTIA DUE TO PARKINSON'S DISEASE, UNSPECIFIED WHETHER BEHAVIORAL, PSYCHOTIC, OR MOOD DISTURBANCE OR ANXIETY (HCC): ICD-10-CM

## 2024-05-30 PROCEDURE — 97112 NEUROMUSCULAR REEDUCATION: CPT | Performed by: PHYSICAL THERAPIST

## 2024-05-30 PROCEDURE — 92507 TX SP LANG VOICE COMM INDIV: CPT | Performed by: SPEECH-LANGUAGE PATHOLOGIST

## 2024-05-30 PROCEDURE — 97116 GAIT TRAINING THERAPY: CPT | Performed by: PHYSICAL THERAPIST

## 2024-05-30 NOTE — PROGRESS NOTES
"Daily Note     Today's date: 2024  Patient name: Robi Bello  : 1940  MRN: [id number]  Referring provider: Joaquim Dickerson MD  Dx:   Encounter Diagnosis     ICD-10-CM    1. Parkinson's disease, unspecified whether dyskinesia present, unspecified whether manifestations fluctuate  G20.A1       2. Balance disorder  R26.89                      Subjective: feeling \"goofy\" and brain is tired after speech therapy. Continues to note right sided pain      Objective: See treatment diary below      Assessment: Tolerated treatment well. Patient participated in physical session today focusing on endurance, gait training, balance and coordination stability and rotation.   Demonstrated challenge with sequencing due to cognitive slowness and motor planning.    Improvements noted with  STS for higher surface without use of UE cues needed to maintain static position and control descent. Patient will continue to benefit from skilled therapy for continued focus on  to maximize safety, functional mobility to improve QOL         Plan: Continue per plan of care.      Short Term Goal Expiration Date:(2024  Long Term Goal Expiration Date: (2024)  POC Expiration Date: (2024)    Visit count: 1 of 10; PN due 2024    POC expires Unit limit Auth Expiration date PT/OT/ST + Visit Limit?   2024 N/A N/A BOMN                           Visit/Unit Tracking  AUTH Status:  Date 03/25 03/28 04/02 04/04 04/09 04/11 04/16 04/18 04/25  PN    BOMN Used 1 2 3 4 5 6 7 8 9 1 2 3 4    Remaining  9 8 7 6 5 4 3 2 1 9 8 7 6     AUTH Status:  Date 05/14 05/16 05/21 05/23 05/28 5/30          BOMN Used 5 6 7 1 2 3           Remaining  5 4 3 9 8 7            Auth  Authorized (3/25/2024-2024)  Visits Requested Visits Authorized Visits Completed Visits Scheduled   [phone]         Manuals                                     Neuro Re-Ed         Hurdles         HKM      "   Dynamic balance    solo  Turning around cones Visual cues and targets for big steps, for turning around cones  Solo       STS    2x10 no UE    Stairs         Boxing         Daily maximal exercises         Diaphragmatic breathing         Bed mobility         Open books         Blazepods         Trunk rotation     Cross body and reach w/clothes plans  seated EOM      Ther Ex        Bike                                                                Ther Activity   SPT tansfer w/o AD solo  X 5 laps      Bed mobility                 Gait Training        Treadmill    Solo step   1.2 mph  B/L UE   X 10 min  Solo step   1.4 mph   B/L UE  W/visual cues for longer step length     5min x 2 ( due to cog fatigue)    Level surface    Solo  Fwd no AD   Solo  Fwd no AD 1/4 track x 4 laps    Modalities

## 2024-06-03 ENCOUNTER — OFFICE VISIT (OUTPATIENT)
Dept: PHYSICAL THERAPY | Facility: CLINIC | Age: 84
End: 2024-06-03
Payer: COMMERCIAL

## 2024-06-03 DIAGNOSIS — R26.89 BALANCE DISORDER: ICD-10-CM

## 2024-06-03 DIAGNOSIS — G20.A1 PARKINSON'S DISEASE, UNSPECIFIED WHETHER DYSKINESIA PRESENT, UNSPECIFIED WHETHER MANIFESTATIONS FLUCTUATE: Primary | ICD-10-CM

## 2024-06-03 PROCEDURE — 97112 NEUROMUSCULAR REEDUCATION: CPT | Performed by: PHYSICAL THERAPIST

## 2024-06-03 PROCEDURE — 97110 THERAPEUTIC EXERCISES: CPT | Performed by: PHYSICAL THERAPIST

## 2024-06-03 NOTE — PROGRESS NOTES
Daily Note     Today's date: 6/3/2024  Patient name: Robi Bello  : 1940  MRN: [id number]  Referring provider: Joaquim Dickerson MD  Dx:   Encounter Diagnosis     ICD-10-CM    1. Parkinson's disease, unspecified whether dyskinesia present, unspecified whether manifestations fluctuate  G20.A1       2. Balance disorder  R26.89                      Subjective: has been waking up at 3am-4 am due to stiffness in knees and back. Not getting as much sleep as he would like.       Objective: See treatment diary below      Assessment: Tolerated treatment well. Patient demonstrated fatigue post treatment and would benefit from continued PT  Able to tolerate 10 consecutive minutes on treadmill for endurance and increased steps with incline.   Challenged with multi step instructions. Frequently loses attention to tasks requires redirection. Demonstrate difficulty with sequencing and movement planning due to confusion    Plan: Continue per plan of care.      Short Term Goal Expiration Date:(2024  Long Term Goal Expiration Date: (2024)  POC Expiration Date: (2024)    Visit count: 1 of 10; PN due 2024    POC expires Unit limit Auth Expiration date PT/OT/ST + Visit Limit?   2024 N/A N/A BOMN                           Visit/Unit Tracking  AUTH Status:  Date 03/25 03/28 04/02 04/04 04/09 04/11 04/16 04/18 04/25  PN    BOMN Used 1 2 3 4 5 6 7 8 9 1 2 3 4    Remaining  9 8 7 6 5 4 3 2 1 9 8 7 6     AUTH Status:  Date 05/14 05/16 05/21 05/23 05/28 5/30 06/03         BOMN Used 5 6 7 1 2 3 4          Remaining  5 4 3 9 8 7 6           Auth  Authorized (3/25/2024-2024)  Visits Requested Visits Authorized Visits Completed Visits Scheduled   [phone]         Manuals                                    Neuro Re-Ed      Requires repeat instructions and demonstrations throughout session due to slow processing.    Hurdles      Without blaze  "pods  solo  6inch  Lat  x 2 laps    HKM        Dynamic balance    solo  Turning around cones Visual cues and targets for big steps, for turning around cones  Solo       STS    2x10 no UE    Stairs         Boxing         Daily maximal exercises         Diaphragmatic breathing         Bed mobility         Open books         Blazepods      Green  Random   Over hurdles   Fwd  1 min 45\" ea   Trial 1: 2   Trail 2 :2  Trial 3:3    Lateral x2          Trunk rotation     Cross body and reach w/clothes plans  seated EOM      Ther Ex        Bike        Treadmill      Solo step   1.0 -1.2 mph   B/L UE  2.0% incline     X 10 min                                                    Ther Activity   SPT tansfer w/o AD solo  X 5 laps      Bed mobility                 Gait Training        Treadmill    Solo step   1.2 mph  B/L UE   X 10 min  Solo step   1.4 mph   B/L UE  W/visual cues for longer step length     5min x 2 ( due to cog fatigue)    Level surface    Solo  Fwd no AD   Solo  Fwd no AD 1/4 track x 4 laps    Modalities                                                                    "

## 2024-06-04 ENCOUNTER — OFFICE VISIT (OUTPATIENT)
Dept: SPEECH THERAPY | Facility: CLINIC | Age: 84
End: 2024-06-04
Payer: COMMERCIAL

## 2024-06-04 ENCOUNTER — OFFICE VISIT (OUTPATIENT)
Dept: PHYSICAL THERAPY | Facility: CLINIC | Age: 84
End: 2024-06-04
Payer: COMMERCIAL

## 2024-06-04 DIAGNOSIS — R26.89 BALANCE DISORDER: ICD-10-CM

## 2024-06-04 DIAGNOSIS — G20.A1 MODERATE DEMENTIA DUE TO PARKINSON'S DISEASE, UNSPECIFIED WHETHER BEHAVIORAL, PSYCHOTIC, OR MOOD DISTURBANCE OR ANXIETY (HCC): ICD-10-CM

## 2024-06-04 DIAGNOSIS — F02.B0 MODERATE DEMENTIA DUE TO PARKINSON'S DISEASE, UNSPECIFIED WHETHER BEHAVIORAL, PSYCHOTIC, OR MOOD DISTURBANCE OR ANXIETY (HCC): ICD-10-CM

## 2024-06-04 DIAGNOSIS — R48.8 OTHER SYMBOLIC DYSFUNCTIONS: Primary | ICD-10-CM

## 2024-06-04 DIAGNOSIS — G20.A1 PARKINSON'S DISEASE, UNSPECIFIED WHETHER DYSKINESIA PRESENT, UNSPECIFIED WHETHER MANIFESTATIONS FLUCTUATE: Primary | ICD-10-CM

## 2024-06-04 PROCEDURE — 97110 THERAPEUTIC EXERCISES: CPT | Performed by: PHYSICAL THERAPIST

## 2024-06-04 PROCEDURE — 92507 TX SP LANG VOICE COMM INDIV: CPT

## 2024-06-04 PROCEDURE — 97112 NEUROMUSCULAR REEDUCATION: CPT | Performed by: PHYSICAL THERAPIST

## 2024-06-04 NOTE — PROGRESS NOTES
Daily Note     Today's date: 2024  Patient name: Robi Bello  : 1940  MRN: [id number]  Referring provider: Joaquim Dickerson MD  Dx:   Encounter Diagnosis     ICD-10-CM    1. Parkinson's disease, unspecified whether dyskinesia present, unspecified whether manifestations fluctuate  G20.A1       2. Balance disorder  R26.89           Start Time: 1515  Stop Time: 1600  Total time in clinic (min): 45 minutes    Subjective: feeling good today     Objective: See treatment diary below      Assessment: Tolerated treatment well. Patient demonstrated fatigue post treatment and would benefit from continued PT    Remains challenged with dual tasks and difficulty with follow complex instructions due to slow processing.     Plan: Continue per plan of care.      Short Term Goal Expiration Date:(2024  Long Term Goal Expiration Date: (2024)  POC Expiration Date: (2024)    Visit count: 1 of 10; PN due 2024    POC expires Unit limit Auth Expiration date PT/OT/ST + Visit Limit?   2024 N/A N/A BOMN                           Visit/Unit Tracking  AUTH Status:  Date   PN    BOMN Used 1 2 3 4 5 6 7 8 9 1 2 3 4    Remaining  9 8 7 6 5 4 3 2 1 9 8 7 6     AUTH Status:  Date 05/14 05/16 05/21 05/23 05/28 5/30 06/03 06/04        BOMN Used 5 6 7 1 2 3 4 5         Remaining  5 4 3 9 8 7 6 5          Auth  Authorized (3/25/2024-2024)  Visits Requested Visits Authorized Visits Completed Visits Scheduled   [phone]         Manuals                                    Neuro Re-Ed      Requires repeat instructions and demonstrations throughout session due to slow processing.    Hurdles      Without blaze pods  solo  6inch  Lat  x 2 laps    HKM With TT 1/4 track   Solo   X 2 laps   Fwd/back        Dynamic balance  Walking HT/HN  Solo 1/4 track  X 2 ea   solo  Turning around cones Visual cues and targets  "for big steps, for turning around cones  Solo       STS 2x10 no UE    2x10 no UE    Stairs         Boxing         Daily maximal exercises         Diaphragmatic breathing         Bed mobility         Open books         Blazepods  Green  Random   Over hurdles   Fwd  1 min 45\" ea   Trial 1: 3  Trail 2 :4  Trial 3:2    Lateral x2     Green  Random   Over hurdles   Fwd  1 min 45\" ea   Trial 1: 2   Trail 2 :2  Trial 3:3    Lateral x2          Trunk rotation     Cross body and reach w/clothes plans  seated EOM      Ther Ex        Bike        Treadmill  Solo B/L UE    1.4mph  3.0% incline x 10 min     Solo step   1.0 -1.2 mph   B/L UE  2.0% incline     X 10 min                                                    Ther Activity   SPT tansfer w/o AD solo  X 5 laps      Bed mobility                 Gait Training        Treadmill    Solo step   1.2 mph  B/L UE   X 10 min  Solo step   1.4 mph   B/L UE  W/visual cues for longer step length     5min x 2 ( due to cog fatigue)    Level surface    Solo  Fwd no AD   Solo  Fwd no AD 1/4 track x 4 laps    Modalities                                                                      "

## 2024-06-04 NOTE — PROGRESS NOTES
Daily Speech Treatment Note    Today's date: 2024  Patient’s name: Robi Bello  : 1940  MRN: [id number]  Safety measures: PD, Fall Risk  Referring provider: Joaquim Dickerson MD    Encounter Diagnosis     ICD-10-CM    1. Other symbolic dysfunctions  R48.8       2. Moderate dementia due to Parkinson's disease, unspecified whether behavioral, psychotic, or mood disturbance or anxiety ([facility])  G20.A1     F02.B0             Visit Tracking:  Visit tracking:  Re-eval Due POC Expires Auth Expiration Date ST Visit Limit   2024 BOMN                           Visit/Unit Tracking  AUTH Status:  Date 2024   BOMN Used 1 2 3 4 5 6 7    Remaining  BOMN BOMN BOMN BOMN BOMN BOMN BOMN       Subjective/Behavioral:  -Patient with no new complaints.     Objective/Assessment:  -See goals targeted below during today's treatment session.      Short-term goals:  1. Patient will complete word generation tasks (e.g., categorical naming, sentence/phrase completion, word deduction, definitions, etc.) at 75% accuracy with min semantic and phonemic cues from clinician as needed to target the maintenance of his/her expressive vocabulary, as well as promote socialization and safety.    To target expressive language: Patient was presented with three clues (e.g. animal, pork, sty) and was instructed to state what word was being described (I.e. word deductions).  He completed this task in 19/25 (76%) opportunities independently, increasing to 25/25 (100%) opportunities given mod verbal cues.  It should be noted that Patient benefited from having the clues put into sentence format (e.g. It is an animal that pork comes from who lives in a sty).      2. Patient will complete cognitive-linguistic activities (e.g., verbal and visual problem solving scenarios, sequencing, reasoning, etc.) at 75% accuracy with min semantic cues from clinician  as needed to target the maintenance of his/her cognitive-linguistic functioning, as well as promote safety and overall QOL.    To target visual immediate memory: Patient was presented with picture scenes and was instructed to study each scene for two minutes.  After the minute was up, the picture was removed from sight and Patient was asked questions regarding details about the scene.  Patient completed this task over 2 picture scenes and answered questions in 11/20 (55%) opportunities independently, increasing to 16/20 (80%) opportunities given maximum semantic and visual cues.     To target following directions: Patient was presented with images and was instructed to follow directions with concepts (e.g. draw a box around the 3, underline the A).  He completed this task in 14/20 (70%) opportunities independently, increasing to 20/20 (100%) opportunities given mod to max verbal and visual cues.      Plan:  -Continue with current plan of care.

## 2024-06-06 ENCOUNTER — OFFICE VISIT (OUTPATIENT)
Dept: PHYSICAL THERAPY | Facility: CLINIC | Age: 84
End: 2024-06-06
Payer: COMMERCIAL

## 2024-06-06 DIAGNOSIS — G20.A1 PARKINSON'S DISEASE, UNSPECIFIED WHETHER DYSKINESIA PRESENT, UNSPECIFIED WHETHER MANIFESTATIONS FLUCTUATE: Primary | ICD-10-CM

## 2024-06-06 DIAGNOSIS — R26.89 BALANCE DISORDER: ICD-10-CM

## 2024-06-06 PROCEDURE — 97112 NEUROMUSCULAR REEDUCATION: CPT | Performed by: PHYSICAL THERAPIST

## 2024-06-06 PROCEDURE — 97116 GAIT TRAINING THERAPY: CPT | Performed by: PHYSICAL THERAPIST

## 2024-06-06 NOTE — PROGRESS NOTES
Daily Note     Today's date: 2024  Patient name: Robi Bello  : 1940  MRN: [id number]  Referring provider: Joaquim Dickerson MD  Dx:   Encounter Diagnosis     ICD-10-CM    1. Parkinson's disease, unspecified whether dyskinesia present, unspecified whether manifestations fluctuate  G20.A1       2. Balance disorder  R26.89                      Subjective: feeling off this past week. More fearful of falling. Note increased tremors today for unknown reason, notes he took his meds as usual.     Assessment: Tolerated treatment well. Patient demonstrated fatigue post treatment and would benefit from continued PT  Attempted to utilize B/L dumbbells for increased sensory input for use of UE with balance tasks but with increase tremors during third activity after prolonged use.   With episodes fo freezing prior to hurdles, improved with use of SPC today.     Plan: Continue per plan of care.      Short Term Goal Expiration Date:(2024  Long Term Goal Expiration Date: (2024)  POC Expiration Date: (2024)    Visit count: 6 of 10; PN due 2024    POC expires Unit limit Auth Expiration date PT/OT/ST + Visit Limit?   2024 N/A N/A BOMN                           Visit/Unit Tracking  AUTH Status:  Date 03/25 03/28 04/02 04/04 04/09 04/11 04/16 04/18 04/25  PN    BOMN Used 1 2 3 4 5 6 7 8 9 1 2 3 4    Remaining  9 8 7 6 5 4 3 2 1 9 8 7 6     AUTH Status:  Date 05/14 05/16 05/21 05/23 05/28 5/30 06/03 06/04 06/06       BOMN Used 5 6 7 1 2 3 4 5 6        Remaining  5 4 3 9 8 7 6 5 4         Auth  Authorized (3/25/2024-2024)  Visits Requested Visits Authorized Visits Completed Visits Scheduled   [phone] 10         Manuals                                    Neuro Re-Ed      Requires repeat instructions and demonstrations throughout session due to slow processing.    Hurdles  Solo step  Holding 4# DB B/L    Fwd x 4 laps  Lat x 4 laps     Without  "blaze pods  solo  6inch  Lat  x 2 laps    HKM        Dynamic balance  Solo step  Backward walking  Holding 4# DB B/L  1/4 track x 4 laps     Fwd walking  Holding 4# DB B/L  1/4 track x 4 laps   solo  Turning around cones Visual cues and targets for big steps, for turning around cones  Solo       STS    2x10 no UE    Stairs         Boxing         Daily maximal exercises         Diaphragmatic breathing         Bed mobility         Open books         Blazepods      Green  Random   Over hurdles   Fwd  1 min 45\" ea   Trial 1: 2   Trail 2 :2  Trial 3:3    Lateral x2          Trunk rotation     Cross body and reach w/clothes plans  seated EOM      Ther Ex        Bike        Treadmill  Solo B/L UE    1.4mph  3.0% incline x 10 min     Solo step   1.0 -1.2 mph   B/L UE  2.0% incline     X 10 min                                                    Ther Activity   SPT tansfer w/o AD solo  X 5 laps      Bed mobility                 Gait Training        Treadmill    Solo step   1.2 mph  B/L UE   X 10 min  Solo step   1.4 mph   B/L UE  W/visual cues for longer step length     5min x 2 ( due to cog fatigue)    Level surface    Solo  Fwd no AD   Solo  Fwd no AD 1/4 track x 4 laps    Modalities                                                                        "

## 2024-06-10 ENCOUNTER — OFFICE VISIT (OUTPATIENT)
Dept: PHYSICAL THERAPY | Facility: CLINIC | Age: 84
End: 2024-06-10
Payer: COMMERCIAL

## 2024-06-10 DIAGNOSIS — G20.A1 PARKINSON'S DISEASE, UNSPECIFIED WHETHER DYSKINESIA PRESENT, UNSPECIFIED WHETHER MANIFESTATIONS FLUCTUATE: ICD-10-CM

## 2024-06-10 DIAGNOSIS — R26.89 BALANCE DISORDER: Primary | ICD-10-CM

## 2024-06-10 PROCEDURE — 97112 NEUROMUSCULAR REEDUCATION: CPT | Performed by: PHYSICAL THERAPIST

## 2024-06-10 PROCEDURE — 97116 GAIT TRAINING THERAPY: CPT | Performed by: PHYSICAL THERAPIST

## 2024-06-10 NOTE — PROGRESS NOTES
Daily Note     Today's date: 6/10/2024  Patient name: Robi Bello  : 1940  MRN: [id number]  Referring provider: Joaquim Dickerson MD  Dx:   Encounter Diagnosis     ICD-10-CM    1. Parkinson's disease, unspecified whether dyskinesia present, unspecified whether manifestations fluctuate  G20.A1       2. Balance disorder  R26.89                      Subjective: doing well today, wife stats he is having a good day.       Objective: See treatment diary below      Assessment: Tolerated treatment well. Patient demonstrated fatigue post treatment and would benefit from continued PT      Plan: Continue per plan of care.      Short Term Goal Expiration Date:(2024  Long Term Goal Expiration Date: (2024)  POC Expiration Date: (2024)    Visit count: 6 of 10; PN due 2024    POC expires Unit limit Auth Expiration date PT/OT/ST + Visit Limit?   2024 N/A N/A BOMN                           Visit/Unit Tracking  AUTH Status:  Date 03/25 03/28 04/02 04/04 04/09 04/11 04/16 04/18 04/25  PN    BOMN Used 1 2 3 4 5 6 7 8 9 1 2 3 4    Remaining  9 8 7 6 5 4 3 2 1 9 8 7 6     AUTH Status:  Date 05/14 05/16 05/21 05/23 05/28 5/30 06/03 06/04 06/06 06/10      BOMN Used 5 6 7 1 2 3 4 5 6 7       Remaining  5 4 3 9 8 7 6 5 4 3        Authorized (3/25/2024-2024)  Visits Requested Visits Authorized Visits Completed Visits Scheduled   [phone]         Manuals 06/06 06/10   5/28 5/30 06/03                                   Neuro Re-Ed      Requires repeat instructions and demonstrations throughout session due to slow processing.    Hurdles  Solo step  Holding 4# DB B/L    Fwd x 4 laps  Lat x 4 laps     Without blaze pods  solo  6inch  Lat  x 2 laps    HKM        Dynamic balance  Solo step  Backward walking  Holding 4# DB B/L  1/4 track x 4 laps     Fwd walking  Holding 4# DB B/L  1/4 track x 4 laps   solo  Turning around cones Visual cues and targets for big steps, for turning  "around cones  Solo       STS    2x10 no UE    Stairs         Boxing         Daily maximal exercises         Diaphragmatic breathing         Bed mobility         Open books         Blazepods      Green  Random   Over hurdles   Fwd  1 min 45\" ea   Trial 1: 2   Trail 2 :2  Trial 3:3    Lateral x2          Trunk rotation     Cross body and reach w/clothes plans  seated EOM      Ther Ex        Bike        Treadmill  Solo B/L UE    1.4mph  3.0% incline x 10 min     Solo step   1.0 -1.2 mph   B/L UE  2.0% incline     X 10 min                                                    Ther Activity   SPT tansfer w/o AD solo  X 5 laps      Bed mobility                 Gait Training        Treadmill    Solo step   1.2 mph  B/L UE   X 10 min  Solo step   1.4 mph   B/L UE  W/visual cues for longer step length     5min x 2 ( due to cog fatigue)    Level surface    Solo  Fwd no AD   Solo  Fwd no AD 1/4 track x 4 laps    Modalities                                                                          "

## 2024-06-11 ENCOUNTER — OFFICE VISIT (OUTPATIENT)
Dept: PHYSICAL THERAPY | Facility: CLINIC | Age: 84
End: 2024-06-11
Payer: COMMERCIAL

## 2024-06-11 ENCOUNTER — OFFICE VISIT (OUTPATIENT)
Dept: SPEECH THERAPY | Facility: CLINIC | Age: 84
End: 2024-06-11
Payer: COMMERCIAL

## 2024-06-11 DIAGNOSIS — R26.89 BALANCE DISORDER: Primary | ICD-10-CM

## 2024-06-11 DIAGNOSIS — G20.A1 MODERATE DEMENTIA DUE TO PARKINSON'S DISEASE, UNSPECIFIED WHETHER BEHAVIORAL, PSYCHOTIC, OR MOOD DISTURBANCE OR ANXIETY (HCC): ICD-10-CM

## 2024-06-11 DIAGNOSIS — R48.8 OTHER SYMBOLIC DYSFUNCTIONS: Primary | ICD-10-CM

## 2024-06-11 DIAGNOSIS — G20.A1 PARKINSON'S DISEASE, UNSPECIFIED WHETHER DYSKINESIA PRESENT, UNSPECIFIED WHETHER MANIFESTATIONS FLUCTUATE: ICD-10-CM

## 2024-06-11 DIAGNOSIS — F02.B0 MODERATE DEMENTIA DUE TO PARKINSON'S DISEASE, UNSPECIFIED WHETHER BEHAVIORAL, PSYCHOTIC, OR MOOD DISTURBANCE OR ANXIETY (HCC): ICD-10-CM

## 2024-06-11 PROCEDURE — 97110 THERAPEUTIC EXERCISES: CPT | Performed by: PHYSICAL THERAPIST

## 2024-06-11 PROCEDURE — 92507 TX SP LANG VOICE COMM INDIV: CPT

## 2024-06-11 PROCEDURE — 97112 NEUROMUSCULAR REEDUCATION: CPT | Performed by: PHYSICAL THERAPIST

## 2024-06-11 NOTE — PROGRESS NOTES
Daily Note     Today's date: 2024  Patient name: Robi Bello  : 1940  MRN: [id number]  Referring provider: Joaquim Dickerson MD  Dx:   Encounter Diagnosis     ICD-10-CM    1. Balance disorder  R26.89       2. Parkinson's disease, unspecified whether dyskinesia present, unspecified whether manifestations fluctuate  G20.A1           Start Time: 1345  Stop Time: 1430  Total time in clinic (min): 45 minutes    Subjective: no issues reported pre-PT.       Objective: See treatment diary below      Assessment: Tolerated treatment progression well. Patient demonstrated fatigue post treatment and would benefit from continued PT  Patient participated in physical session today focusing on endurance, gait training, dynamic balance. Demonstrated challenge with increase speed on treadmill and with episodes of freezing during high hurdles over uneven surface, improved with repeated practice and use of AD. Requires verbal cues for sequencing. Improvements noted with  ambulation over uneven surface without AD. Patient will continue to benefit from skilled therapy for continued focus on  to maximize safety, functional mobility to improve QOL       Plan: Continue per plan of care.      Short Term Goal Expiration Date:(2024  Long Term Goal Expiration Date: (2024)  POC Expiration Date: (2024)    Visit count: 6 of 10; PN due 2024    POC expires Unit limit Auth Expiration date PT/OT/ST + Visit Limit?   2024 N/A N/A BOMN                           Visit/Unit Tracking  AUTH Status:  Date 03/25 03/28 04/02 04/04 04/09 04/11 04/16 04/18 04/25  PN    BOMN Used 1 2 3 4 5 6 7 8 9 1 2 3 4    Remaining  9 8 7 6 5 4 3 2 1 9 8 7 6     AUTH Status:  Date 05/14 05/16 05/21 05/23 05/28 5/30 06/03 06/04 06/06 06/10 06/11     BOMN Used 5 6 7 1 2 3 4 5 6 7 8      Remaining  5 4 3 9 8 7 6 5 4 3 2       Authorized (3/25/2024-2024)  Visits Requested Visits Authorized Visits Completed  "Visits Scheduled   99 [phone] 24 7         Manuals 06/06 06/10   06/11  06/03                                   Neuro Re-Ed      Requires repeat instructions and demonstrations throughout session due to slow processing.    Hurdles  Solo step  Holding 4# DB B/L    Fwd x 4 laps  Lat x 4 laps     Without blaze pods  solo  6inch  Lat  x 2 laps    HKM        Dynamic balance  Solo step  Backward walking  Holding 4# DB B/L  1/4 track x 4 laps     Fwd walking  Holding 4# DB B/L  1/4 track x 4 laps   Solo step   Foam beams WBOS  Fwd walking NO AD  1/4 track x 4 laps    W/low hurdles   Fwd x 4 laps     With alt high (12in) and low (4inc)  With multiple attempts  No AD x 1 laps    With AD x 3 laps with freezing and verbal cues needed for sequencing      STS        Stairs         Boxing         Daily maximal exercises         Diaphragmatic breathing         Bed mobility         Open books         Blazepods      Green  Random   Over hurdles   Fwd  1 min 45\" ea   Trial 1: 2   Trail 2 :2  Trial 3:3    Lateral x2          Trunk rotation         Ther Ex        Bike        Treadmill  Solo B/L UE    1.4mph  3.0% incline x 10 min   Solo  B/L UE  1.4mph-1.6mph    Solo step   1.0 -1.2 mph   B/L UE  2.0% incline     X 10 min                                                    Ther Activity        Bed mobility                 Gait Training        Treadmill         Level surface         Modalities                                                                            "

## 2024-06-11 NOTE — PROGRESS NOTES
"Daily Speech Treatment Note    Today's date: 2024  Patient’s name: Robi Bello  : 1940  MRN: [id number]  Safety measures: PD, Fall Risk  Referring provider: Joaquim Dickerson MD    Encounter Diagnosis     ICD-10-CM    1. Other symbolic dysfunctions  R48.8       2. Moderate dementia due to Parkinson's disease, unspecified whether behavioral, psychotic, or mood disturbance or anxiety ([facility])  G20.A1     F02.B0             Visit Tracking:  Visit tracking:  Re-eval Due POC Expires Auth Expiration Date ST Visit Limit   2024 BOMN                           Visit/Unit Tracking  AUTH Status:  Date 2024         BOMN Used 8          Remaining  BOMN             Subjective/Behavioral:  -\"I know what I want to say, I just can't say it.\"     Objective/Assessment:  -See goals targeted below during today's treatment session.      Short-term goals:  1. Patient will complete word generation tasks (e.g., categorical naming, sentence/phrase completion, word deduction, definitions, etc.) at 75% accuracy with min semantic and phonemic cues from clinician as needed to target the maintenance of his/her expressive vocabulary, as well as promote socialization and safety.    To target expressive language: Patient was verbally presented with categories and was instructed to state three items for each category.  He completed this task over 15 trials in 33/45 (73%) opportunities independently, increasing to 45/45 (100%) opportunities given moderate semantic cues.    2. Patient will complete cognitive-linguistic activities (e.g., verbal and visual problem solving scenarios, sequencing, reasoning, etc.) at 75% accuracy with min semantic cues from clinician as needed to target the maintenance of his/her cognitive-linguistic functioning, as well as promote safety and overall QOL.    To target auditory immediate memory: Patient was verbally presented with short stories (4-5 sentences in length) and was " then asked questions regarding details from each story.  Patient completed this task in 3/8 (38%) opportunities independently, increasing to 8/8 (100%) given a repetition of the story.     To target auditory delayed memory: Patient was asked the same questions from task above approx. 10 minutes later.  Patient answered questions in 6/8 (75%) opportunities independently, increasing to 8/8 (100%) opportunities given min verbal cues.    Patient was asked the same questions another 10 minutes later.  Patient answered questions in 6/8 (75%) opportunities independently, increasing to 8/8 (100%) opportunities given min verbal cues.      Plan:  -Continue with current plan of care.

## 2024-06-13 ENCOUNTER — APPOINTMENT (OUTPATIENT)
Dept: PHYSICAL THERAPY | Facility: CLINIC | Age: 84
End: 2024-06-13
Payer: COMMERCIAL

## 2024-06-17 ENCOUNTER — OFFICE VISIT (OUTPATIENT)
Dept: PHYSICAL THERAPY | Facility: CLINIC | Age: 84
End: 2024-06-17
Payer: COMMERCIAL

## 2024-06-17 DIAGNOSIS — G20.A1 PARKINSON'S DISEASE, UNSPECIFIED WHETHER DYSKINESIA PRESENT, UNSPECIFIED WHETHER MANIFESTATIONS FLUCTUATE: ICD-10-CM

## 2024-06-17 DIAGNOSIS — R26.89 BALANCE DISORDER: Primary | ICD-10-CM

## 2024-06-17 PROCEDURE — 97112 NEUROMUSCULAR REEDUCATION: CPT | Performed by: PHYSICAL THERAPIST

## 2024-06-17 PROCEDURE — 97110 THERAPEUTIC EXERCISES: CPT | Performed by: PHYSICAL THERAPIST

## 2024-06-17 NOTE — PROGRESS NOTES
Daily Note     Today's date: 2024  Patient name: Robi Bello  : 1940  MRN: [id number]  Referring provider: Joaquim Dickerson MD  Dx:   Encounter Diagnosis     ICD-10-CM    1. Balance disorder  R26.89       2. Parkinson's disease, unspecified whether dyskinesia present, unspecified whether manifestations fluctuate  G20.A1                      Subjective: patient notes he felt some increased pressure in his head yesterday and feels off. BP okay yesterday. Also notes he drove home with daughter from [place] yesterday       Objective: See treatment diary below      Assessment: Tolerated treatment well. Patient demonstrated fatigue post treatment and would benefit from continued PT. Spoke with patients wife, she noted they were not in [place] recently and Robi has been at home. She notes he is having more trouble with his dementia lately and with more hallucinations. Challenged cognitively throughout session slow to progress.       Plan: Continue per plan of care.      Short Term Goal Expiration Date:(2024  Long Term Goal Expiration Date: (2024)  POC Expiration Date: (2024)    Visit count: 6 of 10; PN due 2024    POC expires Unit limit Auth Expiration date PT/OT/ST + Visit Limit?   2024 N/A N/A BOMN                           Visit/Unit Tracking  AUTH Status:  Date 03/25 03/28 04/02 04/04 04/09 04/11 04/16 04/18 04/25  PN    BOMN Used 1 2 3 4 5 6 7 8 9 1 2 3 4    Remaining  9 8 7 6 5 4 3 2 1 9 8 7 6     AUTH Status:  Date 05/14 05/16 05/21 05/23 05/28 5/30 06/03 06/04 06/06 06/10 06/11 06/17    BOMN Used 5 6 7 1 2 3 4 5 6 7 8 9     Remaining  5 4 3 9 8 7 6 5 4 3 2 1      Authorized (3/25/2024-2024)  Visits Requested Visits Authorized Visits Completed Visits Scheduled   [phone]         Manuals 06/06 06/10   06/11 06/17 06/03                                   Neuro Re-Ed      Requires repeat instructions and demonstrations  "throughout session due to slow processing.    Hurdles  Solo step  Holding 4# DB B/L    Fwd x 4 laps  Lat x 4 laps    Solo step   0# DB     Fwd x 4 laps  Lat x 4 laps  Without blaze pods  solo  6inch  Lat  x 2 laps    HKM    Solo step   Level surface   With reciprocal punch  1/4 track x 2 laps     Dynamic balance  Solo step  Backward walking  Holding 4# DB B/L  1/4 track x 4 laps     Fwd walking  Holding 4# DB B/L  1/4 track x 4 laps   Solo step   Foam beams WBOS  Fwd walking NO AD  1/4 track x 4 laps    W/low hurdles   Fwd x 4 laps     With alt high (12in) and low (4inc)  With multiple attempts  No AD x 1 laps    With AD x 3 laps with freezing and verbal cues needed for sequencing  Walking HT/HN  1/4 track x 4 laps ea     STS    x10    Stairs         Boxing                 Diaphragmatic breathing         Bed mobility         Open books         Blazepods      Green  Random   Over hurdles   Fwd  1 min 45\" ea   Trial 1: 2   Trail 2 :2  Trial 3:3    Lateral x2          Trunk rotation         Ther Ex        Bike        Treadmill  Solo B/L UE    1.4mph  3.0% incline x 10 min   Solo  B/L UE  1.4mph-1.6mph   Solo b/L UE   1.5 mph  2.0% incline   5 min x 2    Solo step   1.0 -1.2 mph   B/L UE  2.0% incline     X 10 min                                                    Ther Activity        Bed mobility                 Gait Training        Treadmill         Level surface         Modalities                                                                              "

## 2024-06-18 ENCOUNTER — OFFICE VISIT (OUTPATIENT)
Dept: PHYSICAL THERAPY | Facility: CLINIC | Age: 84
End: 2024-06-18
Payer: COMMERCIAL

## 2024-06-18 ENCOUNTER — OFFICE VISIT (OUTPATIENT)
Dept: SPEECH THERAPY | Facility: CLINIC | Age: 84
End: 2024-06-18
Payer: COMMERCIAL

## 2024-06-18 DIAGNOSIS — R26.89 BALANCE DISORDER: Primary | ICD-10-CM

## 2024-06-18 DIAGNOSIS — F02.B0 MODERATE DEMENTIA DUE TO PARKINSON'S DISEASE, UNSPECIFIED WHETHER BEHAVIORAL, PSYCHOTIC, OR MOOD DISTURBANCE OR ANXIETY (HCC): ICD-10-CM

## 2024-06-18 DIAGNOSIS — G20.A1 MODERATE DEMENTIA DUE TO PARKINSON'S DISEASE, UNSPECIFIED WHETHER BEHAVIORAL, PSYCHOTIC, OR MOOD DISTURBANCE OR ANXIETY (HCC): ICD-10-CM

## 2024-06-18 DIAGNOSIS — R48.8 OTHER SYMBOLIC DYSFUNCTIONS: Primary | ICD-10-CM

## 2024-06-18 DIAGNOSIS — G20.A1 PARKINSON'S DISEASE, UNSPECIFIED WHETHER DYSKINESIA PRESENT, UNSPECIFIED WHETHER MANIFESTATIONS FLUCTUATE: ICD-10-CM

## 2024-06-18 PROCEDURE — 97112 NEUROMUSCULAR REEDUCATION: CPT | Performed by: PHYSICAL THERAPIST

## 2024-06-18 PROCEDURE — 97110 THERAPEUTIC EXERCISES: CPT | Performed by: PHYSICAL THERAPIST

## 2024-06-18 PROCEDURE — 92507 TX SP LANG VOICE COMM INDIV: CPT

## 2024-06-18 NOTE — PROGRESS NOTES
Re-Evaluation     Today's date: 2024  Patient name: Robi Bello  : 1940  MRN: [id number]  Referring provider: Joaquim Dickerson MD  Dx:   Encounter Diagnosis     ICD-10-CM    1. Balance disorder  R26.89       2. Parkinson's disease, unspecified whether dyskinesia present, unspecified whether manifestations fluctuate  G20.A1                        Assessment: Progress update performed today to assess patient progress towards short term goals established on 2024. Patient attempted but has not increased movement at home as much as he should. He demonstrates limited progress towards meeting his goals. Was challenged with 6MWT demonstrates multiple episodes of festinating gait, require 1 rest break to stop and re-set upon therapist prompting. Patient wife has noticed a decline in his memory and he has been hallucinating. Wife has ordered a Rolator walker vs cane use for patient. Patient with difficulty carrying over concepts discussed and worked on in PT. Difficulty with following complex and simple commands. Cognitive deficits and progressive nature of PD are likely strong inhibitors to his progress at this point. He was provided with updated HEP, demonstrate good ability to perform, but was unable to recall activities at end of session, reviewed with his wife and provided with written instructions.Patient will benefit from continued therapy of 3x/week to attempt to make improvements in endurance, gait speed and balance to reduce risk for falls and maximize functional mobility and independence.     Impairments: abnormal coordination, abnormal gait, abnormal movement, activity intolerance, impaired balance, impaired physical strength, lacks appropriate home exercise program, safety issue and poor posture   Barriers to therapy: Limited transportation, history of migraines and Meniere disease  Understanding of Dx/Px/POC: good   Prognosis: good        New Goals as of 24:  Patient will be able to  perform all bed mobility independently with minimal verbal cues within 4 weeks. - MET  Patient will improve gait speed to 1.2 m/s indicating a speed needed to safely cross the street within 4 weeks. - NOT MET   Patient will be able to walk 1000ft or more during the 6MWT without an assistive device within 4 weeks to demonstrate improved endurance. - NOT MET     NEW GOALS AS OF 05/23/2024  STG:  Patient will achieve gait speed of 1.0 m/s to demonstrate community ambulation within 4 weeks - NOT MET  Patient will be complaint with increasing activity level at home for improved endurance within 4 weeks - partially MET    LTG  Patient will increased 6 MWT by 200ft or great within 8 weeks   Patient will improve  FGA by 4 points or greater demonstrating significant improvements in balance within 8 weeks     Plan  Patient would benefit from: skilled physical therapy  Referral necessary: No  Planned therapy interventions: home exercise program, graded motor, graded exercise, graded activity, gait training, flexibility, functional ROM exercises, community reintegration, ADL training, balance, motor coordination training, neuromuscular re-education, patient education, postural training, sensory integrative techniques, strengthening, stretching, therapeutic activities, therapeutic exercise, therapeutic training and transfer training  Frequency: 3x week  Duration in weeks: 8  Plan of Care beginning date: 05/23/2024  Plan of Care expiration date: 07/18/2024  Treatment plan discussed with: patient and family    Subjective: not moving around as much at home, not doing enough exercises as he should. Finds PT helpful but can't verbalize with what. Wife notes he does better with movement at home after pt. Reports he had a bad yesterday.    Pain: pain in low back yesterday 10/10 (took tylenol), currently 0/10  Goals: unable to verbalize      Objective: See treatment diary below    Gait Assessment  04/25/24 05/21 05/23 06/18   6 Minute  Walk Test (ft): 750 ft without RW   Post: HR 87 O2 99% 615ft w/SPC 700ft  w/o SPC    HR 76  O2 97%  650ft w/o SPC   1 stand rest   HR 82   o2 99%   2 Minute Walk Test (ft):       Gait Speed (ft/s): 20 ft / 6 sec = 1.02 m/s  20ft/10s w/SPC    20ft/8.3s w/SPC = 2.4ft/s  0.73 m/s   20ft/7.3s w/o SPC=2.7ft/s  0.83 m/s 20ft/8.3s= 2.40ft/s  0.73m/s  W/o SPC   TUG TUG Score: 23 sec (No AD)  Tug COG Score: 26 cog (No AD) 24s W/AD  22a no AD  17.7s W/AD  18.0s W/O AD  W/cog task: 20.8s with inconsistent count and passes target    FGA (<22/30 increased fall risk) 19/30  16/30    Sit to Stand Assessment  5xSTS score (time): 40 sec no UE  W/UE 14s   14.5s w/UE    Balance:       Modified Clinical Test of Sensory Interaction on Balance   Firm surface:  EO 30 sec   EC 30 sec   FOAM surface  EO 30 sec   EC 11sec, 30 sec    NV            Short Term Goal Expiration Date:(2024  Long Term Goal Expiration Date: (2024)  POC Expiration Date: (2024)    Access Code: [email or web address]  URL: [email or web address]  Date: 2024  Prepared by: Gerda Turpinping    Exercises performed 2024  - Standing March with Counter Support  - 1 x daily - 7 x weekly - 3 sets - 10 reps  - Heel Raises with Counter Support  - 1 x daily - 7 x weekly - 3 sets - 10 reps  - Side Stepping with Counter Support  - 1 x daily - 7 x weekly - 3 sets - 10 reps  - Backward Walking with Counter Support  - 1 x daily - 7 x weekly - 3 sets - 10 reps    Visit count: 1 of 10; PN due 2024    POC expires Unit limit Auth Expiration date PT/OT/ST + Visit Limit?   2024 N/A N/A BOMN                           Visit/Unit Tracking  AUTH Status:  Date 03/25 03/28 04/02 04/04 04/09 04/11 04/16 04/18 04/25  PN    BOMN Used 1 2 3 4 5 6 7 8 9 1 2 3 4    Remaining  9 8 7 6 5 4 3 2 1 9 8 7 6     AUTH Status:  Date 05/14 05/16 05/21 05/23 05/28 5/30 06/03 06/04 06/06 06/10 06/11 06/17    BOMN Used 5 6 7 1 2 3 4 5 6 7 8 9      Remaining  5 4 3 9 8 7 6 5 4 3 2 1      AUTH Status:  Date 6/18               BOMN Used 1                Remaining  9                   Authorized (3/25/2024-12/31/2024)  Visits Requested Visits Authorized Visits Completed Visits Scheduled   [phone]         PRECAUTIONS: PD, Fall Risk, Slow processing, Dementia, [place]    Manuals 06/06 06/10   06/11 06/17 06/18                                   Neuro Re-Ed         Hurdles  Solo step  Holding 4# DB B/L    Fwd x 4 laps  Lat x 4 laps     // bars   6in   Fwd x 4 laps  Lat x 2 laps ea   HKM        Dynamic balance  Solo step  Backward walking  Holding 4# DB B/L  1/4 track x 4 laps     Fwd walking  Holding 4# DB B/L  1/4 track x 4 laps   Solo step   Foam beams WBOS  Fwd walking NO AD  1/4 track x 4 laps    W/low hurdles   Fwd x 4 laps     With alt high (12in) and low (4inc)  With multiple attempts  No AD x 1 laps    With AD x 3 laps with freezing and verbal cues needed for sequencing      STS        Stairs         Boxing         Daily maximal exercises         Diaphragmatic breathing         Bed mobility         Open books         Blazepods         Trunk rotation         Ther Ex        Bike        Treadmill  Solo B/L UE    1.4mph  3.0% incline x 10 min   Solo  B/L UE  1.4mph-1.6mph   Solo b/L UE   1.5 mph  2.0% incline   5 min x 2                                                       Ther Activity        Bed mobility                 Gait Training        Treadmill         Level surface         Modalities

## 2024-06-18 NOTE — PROGRESS NOTES
Daily Speech Treatment Note    Today's date: 2024  Patient’s name: Robi Bello  : 1940  MRN: [id number]  Safety measures: PD, Fall Risk  Referring provider: Joaquim Dickerson MD    Encounter Diagnosis     ICD-10-CM    1. Other symbolic dysfunctions  R48.8       2. Moderate dementia due to Parkinson's disease, unspecified whether behavioral, psychotic, or mood disturbance or anxiety ([facility])  G20.A1     F02.B0             Visit Tracking:  Visit tracking:  Re-eval Due POC Expires Auth Expiration Date ST Visit Limit   2024 BOMN                           Visit/Unit Tracking  AUTH Status:  Date 2024        BOMN Used 8 9         Remaining  BOMN BOMN            Subjective/Behavioral:  -Patient with no new complaints today.     Objective/Assessment:  -See goals targeted below during today's treatment session.      Short-term goals:  1. Patient will complete word generation tasks (e.g., categorical naming, sentence/phrase completion, word deduction, definitions, etc.) at 75% accuracy with min semantic and phonemic cues from clinician as needed to target the maintenance of his/her expressive vocabulary, as well as promote socialization and safety.    To target thought organization and categorization: Patient was presented with 40 word cards and was instructed to find groups of 4 based on category (e.g. female family members - grandmother, daughter, mother, great grandmother).  They found groups of 4 in 3/10 (30%) opportunities independently, increasing to 10/10 (100%) opportunities given maximal semantic cues.  Next, Patient was instructed to sequence the 4 items in the group into the most logical order (e.g. oldest to youngest - great grandmother, grandmother, mother, daughter).  Patient sequenced words in 6/10 (60%) opportunities independently, increasing to 10/10 (100%) opportunities given min verbal cues.  It is believed that Patient had the most difficulty with  finding the cards in the array.  This could be secondary to visual issues as well.     2. Patient will complete cognitive-linguistic activities (e.g., verbal and visual problem solving scenarios, sequencing, reasoning, etc.) at 75% accuracy with min semantic cues from clinician as needed to target the maintenance of his/her cognitive-linguistic functioning, as well as promote safety and overall QOL.      Plan:  -Continue with current plan of care.

## 2024-06-20 ENCOUNTER — OFFICE VISIT (OUTPATIENT)
Dept: PHYSICAL THERAPY | Facility: CLINIC | Age: 84
End: 2024-06-20
Payer: COMMERCIAL

## 2024-06-20 DIAGNOSIS — R26.89 BALANCE DISORDER: Primary | ICD-10-CM

## 2024-06-20 DIAGNOSIS — G20.A1 PARKINSON'S DISEASE, UNSPECIFIED WHETHER DYSKINESIA PRESENT, UNSPECIFIED WHETHER MANIFESTATIONS FLUCTUATE: ICD-10-CM

## 2024-06-20 PROCEDURE — 97110 THERAPEUTIC EXERCISES: CPT | Performed by: PHYSICAL THERAPIST

## 2024-06-20 PROCEDURE — 97112 NEUROMUSCULAR REEDUCATION: CPT | Performed by: PHYSICAL THERAPIST

## 2024-06-20 NOTE — PROGRESS NOTES
Daily Note     Today's date: 2024  Patient name: Robi Bello  : 1940  MRN: [id number]  Referring provider: Joaquim Dickerson MD  Dx:   Encounter Diagnosis     ICD-10-CM    1. Balance disorder  R26.89       2. Parkinson's disease, unspecified whether dyskinesia present, unspecified whether manifestations fluctuate  G20.A1                      Subjective: oldest daughter present today, notes Robi has been having right sided back pain. Patient notes back is feeling pretty good today       Objective: See treatment diary below      Assessment: Tolerated treatment well despite chronic back pain . Patient demonstrated fatigue post treatment and would benefit from continued PT  Demonstrates limited shoulder ROM B/Lwith posture activities. Limited carry over with posture activities. Good tolerance with use of basketball, good salience for dual tasking activities.  Demonstrates improved endurance today with less instances of confusion and freezing.     Plan: Continue per plan of care.      Short Term Goal Expiration Date:(2024  Long Term Goal Expiration Date: (2024)  POC Expiration Date: (2024)    Access Code: [email or web address]  URL: [email or web address]  Date: 2024  Prepared by: Gerda Bolanos    Exercises performed 2024  - Standing March with Counter Support  - 1 x daily - 7 x weekly - 3 sets - 10 reps  - Heel Raises with Counter Support  - 1 x daily - 7 x weekly - 3 sets - 10 reps  - Side Stepping with Counter Support  - 1 x daily - 7 x weekly - 3 sets - 10 reps  - Backward Walking with Counter Support  - 1 x daily - 7 x weekly - 3 sets - 10 reps    Visit count: 1 of 10; PN due 2024    POC expires Unit limit Auth Expiration date PT/OT/ST + Visit Limit?   2024 N/A N/A BOMN                           Visit/Unit Tracking  AUTH Status:  Date 03/25 03/28 04/02 04/04 04/09 04/11 04/16 04/18 04/25  PN    BOMN Used 1 2 3 4 5 6 7 8 9 1 2 3 4     Remaining  9 8 7 6 5 4 3 2 1 9 8 7 6     AUTH Status:  Date 05/14 05/16 05/21 05/23 05/28 5/30 06/03 06/04 06/06 06/10 06/11 06/17    BOMN Used 5 6 7 1 2 3 4 5 6 7 8 9     Remaining  5 4 3 9 8 7 6 5 4 3 2 1      AUTH Status:  Date 6/18 06/20              BOMN Used 1 2               Remaining  9 8                  Authorized (3/25/2024-12/31/2024)  Visits Requested Visits Authorized Visits Completed Visits Scheduled   [phone]         PRECAUTIONS: PD, Fall Risk, Slow processing, Dementia, [place]    Manuals 6/20 06/11 06/17 06/18                                   Neuro Re-Ed         Hurdles      // bars   6in   Fwd x 4 laps  Lat x 2 laps ea   HKM        Dynamic balance  Solo   Sidestepping  With open arms   1/4 track x2laps    Hold 3#DB  1/4 track x 4 laps     HKM holding 3# DB   1/4 track x 1   With punching   1/4 track x 3 laps     Walking with alt cross body UE taps to targets (colored cones) called out by PT   To facility turn extension and rotation   1/4 track x 4 laps     Dual tasks dribbling basketball   Swithing hands for emphasis on trunk rotations 1/4 track x 4 laps     Pall toss into hoop overhead with reinforcement of upright BIG posture x 10  Solo step   Foam beams WBOS  Fwd walking NO AD  1/4 track x 4 laps    W/low hurdles   Fwd x 4 laps     With alt high (12in) and low (4inc)  With multiple attempts  No AD x 1 laps    With AD x 3 laps with freezing and verbal cues needed for sequencing      STS        Stairs         Boxing          Posture  OH peach Tbar  X 20 focus on upright posture reinforcement                                Blazepods         Trunk rotation         Ther Ex        Bike        Treadmill  At end of session   Solo b/L UE   1.5 mph   5 min  (due to time constraints)  Solo  B/L UE  1.4mph-1.6mph   Solo b/L UE   1.5 mph  2.0% incline   5 min x 2                                                       Ther Activity        Bed mobility                 Gait Training        Treadmill          Level surface         Modalities

## 2024-06-24 ENCOUNTER — OFFICE VISIT (OUTPATIENT)
Dept: PHYSICAL THERAPY | Facility: CLINIC | Age: 84
End: 2024-06-24
Payer: COMMERCIAL

## 2024-06-24 DIAGNOSIS — R26.89 BALANCE DISORDER: Primary | ICD-10-CM

## 2024-06-24 DIAGNOSIS — G20.A1 PARKINSON'S DISEASE, UNSPECIFIED WHETHER DYSKINESIA PRESENT, UNSPECIFIED WHETHER MANIFESTATIONS FLUCTUATE: ICD-10-CM

## 2024-06-24 PROCEDURE — 97110 THERAPEUTIC EXERCISES: CPT | Performed by: PHYSICAL THERAPIST

## 2024-06-24 PROCEDURE — 97112 NEUROMUSCULAR REEDUCATION: CPT | Performed by: PHYSICAL THERAPIST

## 2024-06-24 NOTE — PROGRESS NOTES
Daily Note     Today's date: 2024  Patient name: Robi Bello  : 1940  MRN: [id number]  Referring provider: Joaquim Dickerson MD  Dx:   Encounter Diagnosis     ICD-10-CM    1. Balance disorder  R26.89       2. Parkinson's disease, unspecified whether dyskinesia present, unspecified whether manifestations fluctuate  G20.A1                      Subjective: woke up early the past 3 days, feeling tired today. Has pain in right side of back. Wife notes he has been doing well       Objective: See treatment diary below      Assessment: Tolerated treatment well. Patient demonstrated fatigue post treatment and would benefit from continued PT/ added ankle weight to increase LE proprioceptive awareness with good success for increased step height on treadmill, with slight decrease in shuffling gait. Solo step used throughout session to maximize speed  In order to induce error and increase efficiency of gait without fear of falling or exterior hand placement needed but patient requires for safety and maximize motor learning.  Improved understanding and ability to following directions today. Appropriately challenged with dynamic balance tasks.         Plan: Continue per plan of care.      Short Term Goal Expiration Date:(2024  Long Term Goal Expiration Date: (2024)  POC Expiration Date: (2024)    Access Code: [email or web address]  URL: [email or web address]  Date: 2024  Prepared by: Gerda Turpinping    Exercises performed 2024  - Standing March with Counter Support  - 1 x daily - 7 x weekly - 3 sets - 10 reps  - Heel Raises with Counter Support  - 1 x daily - 7 x weekly - 3 sets - 10 reps  - Side Stepping with Counter Support  - 1 x daily - 7 x weekly - 3 sets - 10 reps  - Backward Walking with Counter Support  - 1 x daily - 7 x weekly - 3 sets - 10 reps    Visit count: 3 of 10; PN due 2024    POC expires Unit limit Auth Expiration date PT/OT/ST + Visit Limit?   2024 N/A  N/A BOMN                           Visit/Unit Tracking  AUTH Status:  Date 03/25 03/28 04/02 04/04 04/09 04/11 04/16 04/18 04/25  PN 04/30 05/01 05/07 05/09   BOMN Used 1 2 3 4 5 6 7 8 9 1 2 3 4    Remaining  9 8 7 6 5 4 3 2 1 9 8 7 6     AUTH Status:  Date 05/14 05/16 05/21 05/23 05/28 5/30 06/03 06/04 06/06 06/10 06/11 06/17    BOMN Used 5 6 7 1 2 3 4 5 6 7 8 9     Remaining  5 4 3 9 8 7 6 5 4 3 2 1      AUTH Status:  Date 6/18 06/20 06/24             BOMN Used 1 2 3              Remaining  9 8 7                 Authorized (3/25/2024-12/31/2024)  Visits Requested Visits Authorized Visits Completed Visits Scheduled   [phone]         PRECAUTIONS: PD, Fall Risk, Slow processing, Dementia, [place]    Manuals 6/20 06/24 06/11 06/17 06/18                                   Neuro Re-Ed         Hurdles   Solo   No UE  3# b/L AW   6in (x4)  Fwd non reciprocal   X 2 laps    With blockers along the outside to emphasize stepping over devan vs. Around with good affect  Reciprocally x 4 laps  Lateral x 4 laps    // bars   6in   Fwd x 4 laps  Lat x 2 laps ea   HKM        Dynamic balance  Solo   Sidestepping  With open arms   1/4 track x2laps    Hold 3#DB  1/4 track x 4 laps     HKM holding 3# DB   1/4 track x 1   With punching   1/4 track x 3 laps     Walking with alt cross body UE taps to targets (colored cones) called out by PT   To facility turn extension and rotation   1/4 track x 4 laps     Dual tasks dribbling basketball   Swithing hands for emphasis on trunk rotations 1/4 track x 4 laps     Pall toss into hoop overhead with reinforcement of upright BIG posture x 10 Solo f  3# B/L AW Fwd/back walking with bounce pass with PT 1/4 track x 4 laps     Side stepping with self bounce pass 1/4 track x 4 laps  Solo step   Foam beams WBOS  Fwd walking NO AD  1/4 track x 4 laps    W/low hurdles   Fwd x 4 laps     With alt high (12in) and low (4inc)  With multiple attempts  No AD x 1 laps    With AD x 3 laps with freezing and verbal  cues needed for sequencing      STS        Stairs         Boxing          Posture  OH peach Tbar  X 20 focus on upright posture reinforcement        STS   STS managing basketball with   Standing with HKM dribbling 4 x, then sit down holding basketball    X 10 ea                      Georges         Trunk rotation         Ther Ex        Bike        Treadmill  At end of session   Solo b/L UE   1.5 mph   5 min  (due to time constraints) Solo  B/L UE  1.0mph-1.3-1.2mph  3# B/L AW   X 11 min    Cues for increase step height and length to help with efficiency of gait    Solo  B/L UE  1.4mph-1.6mph   Solo b/L UE   1.5 mph  2.0% incline   5 min x 2                                                       Ther Activity        Bed mobility                 Gait Training        Treadmill         Level surface         Modalities

## 2024-06-25 ENCOUNTER — EVALUATION (OUTPATIENT)
Dept: SPEECH THERAPY | Facility: CLINIC | Age: 84
End: 2024-06-25
Payer: COMMERCIAL

## 2024-06-25 ENCOUNTER — OFFICE VISIT (OUTPATIENT)
Dept: PHYSICAL THERAPY | Facility: CLINIC | Age: 84
End: 2024-06-25
Payer: COMMERCIAL

## 2024-06-25 DIAGNOSIS — G20.A1 PARKINSON'S DISEASE, UNSPECIFIED WHETHER DYSKINESIA PRESENT, UNSPECIFIED WHETHER MANIFESTATIONS FLUCTUATE: ICD-10-CM

## 2024-06-25 DIAGNOSIS — R48.8 OTHER SYMBOLIC DYSFUNCTIONS: Primary | ICD-10-CM

## 2024-06-25 DIAGNOSIS — R26.89 BALANCE DISORDER: Primary | ICD-10-CM

## 2024-06-25 DIAGNOSIS — G20.A1 MODERATE DEMENTIA DUE TO PARKINSON'S DISEASE, UNSPECIFIED WHETHER BEHAVIORAL, PSYCHOTIC, OR MOOD DISTURBANCE OR ANXIETY (HCC): ICD-10-CM

## 2024-06-25 DIAGNOSIS — F02.B0 MODERATE DEMENTIA DUE TO PARKINSON'S DISEASE, UNSPECIFIED WHETHER BEHAVIORAL, PSYCHOTIC, OR MOOD DISTURBANCE OR ANXIETY (HCC): ICD-10-CM

## 2024-06-25 PROCEDURE — 97112 NEUROMUSCULAR REEDUCATION: CPT | Performed by: PHYSICAL THERAPIST

## 2024-06-25 PROCEDURE — 92507 TX SP LANG VOICE COMM INDIV: CPT

## 2024-06-25 PROCEDURE — 92523 SPEECH SOUND LANG COMPREHEN: CPT

## 2024-06-25 PROCEDURE — 97110 THERAPEUTIC EXERCISES: CPT | Performed by: PHYSICAL THERAPIST

## 2024-06-25 NOTE — PROGRESS NOTES
Daily Note     Today's date: 2024  Patient name: Robi Bello  : 1940  MRN: [id number]  Referring provider: Joaquim Dickerson MD  Dx:   Encounter Diagnosis     ICD-10-CM    1. Balance disorder  R26.89       2. Parkinson's disease, unspecified whether dyskinesia present, unspecified whether manifestations fluctuate  G20.A1                      Subjective: Patient reports he is getting tired of this routine      Objective: See treatment diary below      Assessment: Tolerated treatment well. Patient demonstrated fatigue post treatment and would benefit from continued PT. Started off session well, but with fatigue was confused and challenged with sequencing for step up activities, often forgot what he was doing and was difficult to redirect which compromised his safety awareness during session. Requires cues for complete STS with adequate posture and controlled descent, without use of hands, fair carry over.       Plan: Continue per plan of care. Resume use of solo step NV.      Short Term Goal Expiration Date:(2024  Long Term Goal Expiration Date: (2024)  POC Expiration Date: (2024)    Access Code: [email or web address]  URL: [email or web address]  Date: 2024  Prepared by: Gerda Bolanos    Exercises performed 2024  - Standing March with Counter Support  - 1 x daily - 7 x weekly - 3 sets - 10 reps  - Heel Raises with Counter Support  - 1 x daily - 7 x weekly - 3 sets - 10 reps  - Side Stepping with Counter Support  - 1 x daily - 7 x weekly - 3 sets - 10 reps  - Backward Walking with Counter Support  - 1 x daily - 7 x weekly - 3 sets - 10 reps    Visit count: 3 of 10; PN due 2024    POC expires Unit limit Auth Expiration date PT/OT/ST + Visit Limit?   2024 N/A N/A BOMN                           Visit/Unit Tracking  AUTH Status:  Date 03/25 03/28 04/02 04/04 04/09 04/11 04/16 04/18 04/25  PN    BOMN Used 1 2 3 4 5 6 7 8 9 1 2 3 4     Remaining  9 8 7 6 5 4 3 2 1 9 8 7 6     AUTH Status:  Date 05/14 05/16 05/21 05/23 05/28 5/30 06/03 06/04 06/06 06/10 06/11 06/17    BOMN Used 5 6 7 1 2 3 4 5 6 7 8 9     Remaining  5 4 3 9 8 7 6 5 4 3 2 1      AUTH Status:  Date 6/18 06/20 06/24 06/25            BOMN Used 1 2 3 4             Remaining  9 8 7 6                Authorized (3/25/2024-12/31/2024)  Visits Requested Visits Authorized Visits Completed Visits Scheduled   [phone]         PRECAUTIONS: PD, Fall Risk, Slow processing, Dementia, [place]    Manuals 6/20 06/24 06/25 06/18                                   Neuro Re-Ed         Hurdles   Solo   No UE  3# b/L AW   6in (x4)  Fwd non reciprocal   X 2 laps    With blockers along the outside to emphasize stepping over devan vs. Around with good affect  Reciprocally x 4 laps  Lateral x 4 laps  10ft //bars   6inch hurdles  (5)  Fwd non reciprocal x 3 laps   Reciprocally x 4 laps     Lateral   X 3 laps cues and demo for proper foot placement and sequencing   // bars   6in   Fwd x 4 laps  Lat x 2 laps ea   HKM   10ft //bars   Fwd/lat x 2 laps each UE PRN      Dynamic balance  Solo   Sidestepping  With open arms   1/4 track x2laps    Hold 3#DB  1/4 track x 4 laps     HKM holding 3# DB   1/4 track x 1   With punching   1/4 track x 3 laps     Walking with alt cross body UE taps to targets (colored cones) called out by PT   To facility turn extension and rotation   1/4 track x 4 laps     Dual tasks dribbling basketball   Swithing hands for emphasis on trunk rotations 1/4 track x 4 laps     Pall toss into hoop overhead with reinforcement of upright BIG posture x 10 Solo f  3# B/L AW Fwd/back walking with bounce pass with PT 1/4 track x 4 laps     Side stepping with self bounce pass 1/4 track x 4 laps  Step ups // bars B/L UE  6inch   Fwd 15x ea   Lateral x 10 ea  Slow to progress through activity due to slow processing and difficulty w/motor planing     Stairs         Boxing          Posture  OH peach Tbar  X 20  focus on upright posture reinforcement   Education reviewed proper posture strategy with activities t/o session      STS   STS managing basketball with   Standing with HKM dribbling 4 x, then sit down holding basketball    X 10 ea 2x10 with cueing for fwd weight shift, upright posture and controlled descent      Blazepods         Trunk rotation         Ther Ex        Bike   Nu step   L6 x 10 min for cardio endurance, mobility and ROM      Treadmill  At end of session   Solo b/L UE   1.5 mph   5 min  (due to time constraints) Solo  B/L UE  1.0mph-1.3-1.2mph  3# B/L AW   X 11 min    Cues for increase step height and length to help with efficiency of gait                                                         Ther Activity        Bed mobility                 Gait Training        Treadmill         Level surface         Modalities

## 2024-06-25 NOTE — LETTER
2024    Joaquim Dickerson MD  [address] [place] [id number]    Patient: Robi Bello   YOB: 1940   Date of Visit: 2024     Encounter Diagnosis     ICD-10-CM    1. Other symbolic dysfunctions  R48.8       2. Moderate dementia due to Parkinson's disease, unspecified whether behavioral, psychotic, or mood disturbance or anxiety (HCC)  G20.A1     F02.B0           Dear Dr. Dickerson:    Thank you for your recent referral of Robi Bello. Please review the attached evaluation summary from Robi's recent visit.     Please verify that you agree with the plan of care by signing the attached order.     If you have any questions or concerns, please do not hesitate to call.     I sincerely appreciate the opportunity to share in the care of one of your patients and hope to have another opportunity to work with you in the near future.     Sincerely,    Nori Johnson, SLP      Referring Provider:     Based upon review of the patient's progress and continued therapy plan, it is my medical opinion that Robi Bello should continue speech therapy treatment at the Physical Therapy at [address]:                    Joaquim Dickerson MD  [address] [place] [id number]  Via Fax: [phone]        Speech-Language Pathology Re-Evaluation    Today's date: 2024   Patient’s name: Robi Bello  : 1940  MRN: [id number]  Safety measures: PD, Fall Risk  Referring provider: Joaquim Dickerson MD    Encounter Diagnosis     ICD-10-CM    1. Other symbolic dysfunctions  R48.8       2. Moderate dementia due to Parkinson's disease, unspecified whether behavioral, psychotic, or mood disturbance or anxiety (HCC)  G20.A1     F02.B0           Assessment:   -Patient continues to present with moderate to severe (but stable) cognitive-linguistic deficits characterized by decreased auditory immediate memory, auditory and visual attention, and word finding secondary to  "Dementia from PD.  During testing today, Patient demonstrated strengths with personal information (e.g. demographics), spatial orientation, and confrontational naming.  Patient demonstrated weaknesses with temporal orientation, auditory attention (e.g. letter list, mental control, digits forward and backward), executive functions (e.g. trail making, math), and immediate/delayed auditory memory.  Patient was aware of his errors as testing continued.  Patient stated, \"I know I should know this, but I just can't think of it.\"  He was not able to correct any of the errors he was making in the moment.  Based on testing results today and nature of Patient's diagnosis, it is recommended that he continue with a maintenance program.  Patient will be seen 1X a week to target the goals mentioned below to maintain his current level of functioning and attempt to prevent rapid regression.        Short Term Goals  1. Patient will complete word generation tasks (e.g., categorical naming, sentence/phrase completion, word deduction, definitions, etc.) at 75% accuracy with min semantic and phonemic cues from clinician as needed to target the maintenance of his/her expressive vocabulary, as well as promote socialization and safety. --ONGOING    2. Patient will complete cognitive-linguistic activities (e.g., verbal and visual problem solving scenarios, sequencing, reasoning, etc.) at 75% accuracy with min semantic cues from clinician as needed to target the maintenance of his/her cognitive-linguistic functioning, as well as promote safety and overall QOL. --ONGOING    Long Term Goals  1. Patient will maintain his/her highest level of independence with her current cognitive-linguistic functioning to meet her needs with the implementation of compensatory strategies to promote positive communication interactions and socialization, participation in meaningful activities, safety, and quality of life (to be achieved by discharge). " "--ONGOING    2. Patient will complete cognitive-linguistic therapy that addresses patient’s specific deficits in processing speed, short-term working memory, attention to detail, monitoring, sequencing, and organization skills, with instruction, to alleviate effects of executive functioning disorder deficits by discharge. --ONGOING      Plan:  Patient would benefit from outpatient skilled Speech Therapy services: Cognitive-linguistic therapy and Maintenance therapy program    Frequency: 1x weekly  Duration:  8-10 Weeks    Intervention certification from: 6/25/2024  Intervention certification to: 09/03/2024      Subjective:  -Patient with no new complaints.     Patient's goal(s): \"I don't know.\"       Objective (testing):    The Brief Cognitive Assessment Test (BCAT) is a multi-domain cognitive tool that assesses a patient’s orientation, verbal recall, visual recognition, visual recall, attention, abstraction, language, executive functions, and visuospatial processing. BCAT is sensitive to the full spectrum of cognitive functioning (normal, MCI, mild dementia, moderate-severe dementia) and can predict basic and instrumental activities of daily living (ADL, IADL).     During today’s administration of the test, patient achieved a total score of 17/50 points.     Using the BCAT Crosswalk Table as a reference, patient’s score falls within the range and may be suggestive of \"Moderate to Severe Dementia\". The Crosswalk Table suggests the following:      Cognitive Stage: Moderate to Severe Dementia (BCAT Range: 0-25 // MMSE: 0-18 // GDS: 5-7)  Cognitive & Functional Issues: Moderate (upper end of range) - Pervasive functional deficits (IADLs), but ADLs generally intact; marked deficits in memory and executive functions; behavioral and psychological symptoms are common; requires significant residential support. // Severe (lower end of range) - Needs assistance in ADLs/IADLs; pervasive cognitive deficits; requires complex " care.       Treatment:  -To target word finding: Patient was presented with a list of three items (e.g. orange, plum, apple) and was instructed to state the category (e.g. fruit) and add another to the list (e.g. peach).  He named categories in 14/15 (93%) opportunities independently, increasing to 15/15 (100%) opportunities given semantic cues.  Patient added an item to the list in 9/15 (60%) opportunities independently, increasing to 15/15 (100%) opportunities given semantic cues.      Visit Tracking:  Re-eval Due POC Expires Auth Expiration Date ST Visit Limit   06/11/2024 06/26/2024 12/31/2024 BOMN   09/03/2024 09/03/2024 12/31/2024 BOMN                     Visit/Unit Tracking  AUTH Status:  Date 06/11/2024 06/18/2024 06/25/2024       BOMN Used 8 9 10        Remaining  BOMN BOMN BOMN           Intervention comments:  30 minutes test administration; 15 minutes therapy activities

## 2024-06-25 NOTE — PROGRESS NOTES
"Speech-Language Pathology Re-Evaluation    Today's date: 2024   Patient’s name: Robi Bello  : 1940  MRN: [id number]  Safety measures: PD, Fall Risk  Referring provider: Joaquim Dickerson MD    Encounter Diagnosis     ICD-10-CM    1. Other symbolic dysfunctions  R48.8       2. Moderate dementia due to Parkinson's disease, unspecified whether behavioral, psychotic, or mood disturbance or anxiety ([facility])  G20.A1     F02.B0           Assessment:   -Patient continues to present with moderate to severe (but stable) cognitive-linguistic deficits characterized by decreased auditory immediate memory, auditory and visual attention, and word finding secondary to Dementia from PD.  During testing today, Patient demonstrated strengths with personal information (e.g. demographics), spatial orientation, and confrontational naming.  Patient demonstrated weaknesses with temporal orientation, auditory attention (e.g. letter list, mental control, digits forward and backward), executive functions (e.g. trail making, math), and immediate/delayed auditory memory.  Patient was aware of his errors as testing continued.  Patient stated, \"I know I should know this, but I just can't think of it.\"  He was not able to correct any of the errors he was making in the moment.  Based on testing results today and nature of Patient's diagnosis, it is recommended that he continue with a maintenance program.  Patient will be seen 1X a week to target the goals mentioned below to maintain his current level of functioning and attempt to prevent rapid regression.        Short Term Goals  1. Patient will complete word generation tasks (e.g., categorical naming, sentence/phrase completion, word deduction, definitions, etc.) at 75% accuracy with min semantic and phonemic cues from clinician as needed to target the maintenance of his/her expressive vocabulary, as well as promote socialization and safety. --ONGOING    2. Patient will complete " "cognitive-linguistic activities (e.g., verbal and visual problem solving scenarios, sequencing, reasoning, etc.) at 75% accuracy with min semantic cues from clinician as needed to target the maintenance of his/her cognitive-linguistic functioning, as well as promote safety and overall QOL. --ONGOING    Long Term Goals  1. Patient will maintain his/her highest level of independence with her current cognitive-linguistic functioning to meet her needs with the implementation of compensatory strategies to promote positive communication interactions and socialization, participation in meaningful activities, safety, and quality of life (to be achieved by discharge). --ONGOING    2. Patient will complete cognitive-linguistic therapy that addresses patient’s specific deficits in processing speed, short-term working memory, attention to detail, monitoring, sequencing, and organization skills, with instruction, to alleviate effects of executive functioning disorder deficits by discharge. --ONGOING      Plan:  Patient would benefit from outpatient skilled Speech Therapy services: Cognitive-linguistic therapy and Maintenance therapy program    Frequency: 1x weekly  Duration:  8-10 Weeks    Intervention certification from: 6/25/2024  Intervention certification to: 09/03/2024      Subjective:  -Patient with no new complaints.     Patient's goal(s): \"I don't know.\"       Objective (testing):    The Brief Cognitive Assessment Test (BCAT) is a multi-domain cognitive tool that assesses a patient’s orientation, verbal recall, visual recognition, visual recall, attention, abstraction, language, executive functions, and visuospatial processing. BCAT is sensitive to the full spectrum of cognitive functioning (normal, MCI, mild dementia, moderate-severe dementia) and can predict basic and instrumental activities of daily living (ADL, IADL).     During today’s administration of the test, patient achieved a total score of 17/50 points. " "    Using the BCAT Crosswalk Table as a reference, patient’s score falls within the range and may be suggestive of \"Moderate to Severe Dementia\". The Crosswalk Table suggests the following:      Cognitive Stage: Moderate to Severe Dementia (BCAT Range: 0-25 // MMSE: 0-18 // GDS: 5-7)  Cognitive & Functional Issues: Moderate (upper end of range) - Pervasive functional deficits (IADLs), but ADLs generally intact; marked deficits in memory and executive functions; behavioral and psychological symptoms are common; requires significant residential support. // Severe (lower end of range) - Needs assistance in ADLs/IADLs; pervasive cognitive deficits; requires complex care.       Treatment:  -To target word finding: Patient was presented with a list of three items (e.g. orange, plum, apple) and was instructed to state the category (e.g. fruit) and add another to the list (e.g. peach).  He named categories in 14/15 (93%) opportunities independently, increasing to 15/15 (100%) opportunities given semantic cues.  Patient added an item to the list in 9/15 (60%) opportunities independently, increasing to 15/15 (100%) opportunities given semantic cues.      Visit Tracking:  Re-eval Due POC Expires Auth Expiration Date ST Visit Limit   06/11/2024 06/26/2024 12/31/2024 BOMN   09/03/2024 09/03/2024 12/31/2024 BOMN                     Visit/Unit Tracking  AUTH Status:  Date 06/11/2024 06/18/2024 06/25/2024       BOMN Used 8 9 10        Remaining  BOMN BOMN BOMN           Intervention comments:  30 minutes test administration; 15 minutes therapy activities   "

## 2024-06-27 ENCOUNTER — OFFICE VISIT (OUTPATIENT)
Dept: PHYSICAL THERAPY | Facility: CLINIC | Age: 84
End: 2024-06-27
Payer: COMMERCIAL

## 2024-06-27 DIAGNOSIS — R26.89 BALANCE DISORDER: Primary | ICD-10-CM

## 2024-06-27 DIAGNOSIS — G20.A1 PARKINSON'S DISEASE, UNSPECIFIED WHETHER DYSKINESIA PRESENT, UNSPECIFIED WHETHER MANIFESTATIONS FLUCTUATE: ICD-10-CM

## 2024-06-27 PROCEDURE — 97112 NEUROMUSCULAR REEDUCATION: CPT

## 2024-06-27 PROCEDURE — 97110 THERAPEUTIC EXERCISES: CPT

## 2024-06-27 NOTE — PROGRESS NOTES
"Daily Note     Today's date: 2024  Patient name: Robi Bello  : 1940  MRN: [id number]  Referring provider: Joaquim Dickerson MD  Dx:   Encounter Diagnosis     ICD-10-CM    1. Balance disorder  R26.89       2. Parkinson's disease, unspecified whether dyskinesia present, unspecified whether manifestations fluctuate  G20.A1                      Subjective: Pt without specific complaints, but reports \"everything is bothering me.\"      Objective: See treatment diary below      Assessment: Pt is agreeable to participate in therapy session this date, tolerating all interventions fairly well. He does, however, fatigue throughout the session with noted increased difficulty with motor planning when fatigued. Therefore, he requires increasing amounts of cues and additional time to complete tasks. He is receptive to cues when fatigued this session which positively impacts his safety. Pt is challenged without UE support during dynamic tasks due to overall dec stability. He is making progress, but remains limited by dec strength, dec balance, dec endurance, and dec safety. He continues to benefit from skilled PT services to address these deficits to maximize his functional potential and minimize risk for falls.       Plan: Continue per plan of care.      Short Term Goal Expiration Date:(2024  Long Term Goal Expiration Date: (2024)  POC Expiration Date: (2024)    Access Code: [email or web address]  URL: [email or web address]  Date: 2024  Prepared by: Gerda Bolanos    Exercises performed 2024  - Standing March with Counter Support  - 1 x daily - 7 x weekly - 3 sets - 10 reps  - Heel Raises with Counter Support  - 1 x daily - 7 x weekly - 3 sets - 10 reps  - Side Stepping with Counter Support  - 1 x daily - 7 x weekly - 3 sets - 10 reps  - Backward Walking with Counter Support  - 1 x daily - 7 x weekly - 3 sets - 10 reps    Visit count: 3 of 10; PN due 2024    POC expires Unit " limit Auth Expiration date PT/OT/ST + Visit Limit?   05/20/2024 N/A N/A BOMN                           Visit/Unit Tracking  AUTH Status:  Date 03/25 03/28 04/02 04/04 04/09 04/11 04/16 04/18 04/25  PN 04/30 05/01 05/07 05/09   BOMN Used 1 2 3 4 5 6 7 8 9 1 2 3 4    Remaining  9 8 7 6 5 4 3 2 1 9 8 7 6     AUTH Status:  Date 05/14 05/16 05/21 05/23 05/28 5/30 06/03 06/04 06/06 06/10 06/11 06/17    BOMN Used 5 6 7 1 2 3 4 5 6 7 8 9     Remaining  5 4 3 9 8 7 6 5 4 3 2 1      AUTH Status:  Date 6/18 06/20 06/24 06/25 6/27           BOMN Used 1 2 3 4 5            Remaining  9 8 7 6 5               Authorized (3/25/2024-12/31/2024)  Visits Requested Visits Authorized Visits Completed Visits Scheduled   [phone]         PRECAUTIONS: PD, Fall Risk, Slow processing, Dementia, [place]    Manuals 6/20 06/24 06/25 6/27 06/18                                   Neuro Re-Ed         Hurdles   Solo   No UE  3# b/L AW   6in (x4)  Fwd non reciprocal   X 2 laps    With blockers along the outside to emphasize stepping over devan vs. Around with good affect  Reciprocally x 4 laps  Lateral x 4 laps  10ft //bars   6inch hurdles  (5)  Fwd non reciprocal x 3 laps   Reciprocally x 4 laps     Lateral   X 3 laps cues and demo for proper foot placement and sequencing  In SOLO:  6 inch hurdles (5) fwd x5 laps, Vcs for inc clearance with trailing leg    6 inch hurdles (5) lat x3 laps, Vcs for technique and sequence // bars   6in   Fwd x 4 laps  Lat x 2 laps ea   HKM   10ft //bars   Fwd/lat x 2 laps each UE PRN  In SOLO: 1/4 laps x6, Vcs for inc excursion    Dynamic balance  Solo   Sidestepping  With open arms   1/4 track x2laps    Hold 3#DB  1/4 track x 4 laps     HKM holding 3# DB   1/4 track x 1   With punching   1/4 track x 3 laps     Walking with alt cross body UE taps to targets (colored cones) called out by PT   To facility turn extension and rotation   1/4 track x 4 laps     Dual tasks dribbling basketball   Swithing hands for emphasis on  trunk rotations 1/4 track x 4 laps     Pall toss into hoop overhead with reinforcement of upright BIG posture x 10 Solo f  3# B/L AW Fwd/back walking with bounce pass with PT 1/4 track x 4 laps     Side stepping with self bounce pass 1/4 track x 4 laps  Step ups // bars B/L UE  6inch   Fwd 15x ea   Lateral x 10 ea  Slow to progress through activity due to slow processing and difficulty w/motor planing In SOLO: 6 inch block fwd step ups x15 ea side, lat x10 ea side, Vcs for sequence and technique    Stairs         Boxing          Posture  OH peach Tbar  X 20 focus on upright posture reinforcement   Education reviewed proper posture strategy with activities t/o session      STS   STS managing basketball with   Standing with HKM dribbling 4 x, then sit down holding basketball    X 10 ea 2x10 with cueing for fwd weight shift, upright posture and controlled descent      Blazepods         Trunk rotation         Ther Ex        Bike   Nu step   L6 x 10 min for cardio endurance, mobility and ROM  NuStep L6 x10 min for cardio endurance, mobility, ROM    Treadmill  At end of session   Solo b/L UE   1.5 mph   5 min  (due to time constraints) Solo  B/L UE  1.0mph-1.3-1.2mph  3# B/L AW   X 11 min    Cues for increase step height and length to help with efficiency of gait                                                         Ther Activity        Bed mobility                 Gait Training        Treadmill         Level surface         Modalities

## 2024-07-01 ENCOUNTER — OFFICE VISIT (OUTPATIENT)
Dept: PHYSICAL THERAPY | Facility: CLINIC | Age: 84
End: 2024-07-01
Payer: COMMERCIAL

## 2024-07-01 DIAGNOSIS — R26.89 BALANCE DISORDER: Primary | ICD-10-CM

## 2024-07-01 DIAGNOSIS — G20.A1 PARKINSON'S DISEASE, UNSPECIFIED WHETHER DYSKINESIA PRESENT, UNSPECIFIED WHETHER MANIFESTATIONS FLUCTUATE: ICD-10-CM

## 2024-07-01 PROCEDURE — 97112 NEUROMUSCULAR REEDUCATION: CPT | Performed by: PHYSICAL THERAPIST

## 2024-07-01 PROCEDURE — 97110 THERAPEUTIC EXERCISES: CPT | Performed by: PHYSICAL THERAPIST

## 2024-07-01 NOTE — PROGRESS NOTES
Daily Note     Today's date: 2024  Patient name: Robi Bello  : 1940  MRN: [id number]  Referring provider: Joaquim Dickerson MD  Dx:   Encounter Diagnosis     ICD-10-CM    1. Balance disorder  R26.89       2. Parkinson's disease, unspecified whether dyskinesia present, unspecified whether manifestations fluctuate  G20.A1                      Subjective: wife notes his memory is worsening. Patient unsure how his performance will be in therapy today.     Objective: See treatment diary below      Assessment: Tolerated treatment well. Patient demonstrated fatigue post treatment and would benefit from continued PT  Decreased tolerance on treadmill today for endurance compared to previous sessions. Overall with more confusion and fatigue limiting progress this session. Minima complaints of back pain.   Requires supervision exiting and entering the clinic due to distractions and physical objects   Plan: Continue per plan of care.      Short Term Goal Expiration Date:(2024  Long Term Goal Expiration Date: (2024)  POC Expiration Date: (2024)    Access Code: [email or web address]  URL: [email or web address]  Date: 2024  Prepared by: Gerda Turpinping    Exercises performed 2024  - Standing March with Counter Support  - 1 x daily - 7 x weekly - 3 sets - 10 reps  - Heel Raises with Counter Support  - 1 x daily - 7 x weekly - 3 sets - 10 reps  - Side Stepping with Counter Support  - 1 x daily - 7 x weekly - 3 sets - 10 reps  - Backward Walking with Counter Support  - 1 x daily - 7 x weekly - 3 sets - 10 reps    Visit count: 3 of 10; PN due 2024    POC expires Unit limit Auth Expiration date PT/OT/ST + Visit Limit?   2024 N/A N/A BOMN                           Visit/Unit Tracking  AUTH Status:  Date 03/25 03/28 04/02 04/04 04/09 04/11 04/16 04/18 04/25  PN    BOMN Used 1 2 3 4 5 6 7 8 9 1 2 3 4    Remaining  9 8 7 6 5 4 3 2 1 9 8 7 6     AUTH Status:   Date 05/14 05/16 05/21 05/23 05/28 5/30 06/03 06/04 06/06 06/10 06/11 06/17    BOMN Used 5 6 7 1 2 3 4 5 6 7 8 9     Remaining  5 4 3 9 8 7 6 5 4 3 2 1      AUTH Status:  Date 6/18 06/20 06/24 06/25 6/27 07/01          BOMN Used 1 2 3 4 5 6           Remaining  9 8 7 6 5 4              Authorized (3/25/2024-12/31/2024)  Visits Requested Visits Authorized Visits Completed Visits Scheduled   [phone]         PRECAUTIONS: PD, Fall Risk, Slow processing, Dementia, [place]    Manuals 6/20 06/24 06/25 6/27 07/01                                   Neuro Re-Ed         Hurdles   Solo   No UE  3# b/L AW   6in (x4)  Fwd non reciprocal   X 2 laps    With blockers along the outside to emphasize stepping over devan vs. Around with good affect  Reciprocally x 4 laps  Lateral x 4 laps  10ft //bars   6inch hurdles  (5)  Fwd non reciprocal x 3 laps   Reciprocally x 4 laps     Lateral   X 3 laps cues and demo for proper foot placement and sequencing  In SOLO:  6 inch hurdles (5) fwd x5 laps, Vcs for inc clearance with trailing leg    6 inch hurdles (5) lat x3 laps, Vcs for technique and sequence Solo 1/4 track  6 inch hurdles (8) lat FWD: x4 laps,   Non reciprocally  Vcs for technique and sequence    Lat: x4 laps  Vcs for technique and sequence   HKM   10ft //bars   Fwd/lat x 2 laps each UE PRN  In SOLO: 1/4 laps x6, Vcs for inc excursion    Dynamic balance  Solo   Sidestepping  With open arms   1/4 track x2laps    Hold 3#DB  1/4 track x 4 laps     HKM holding 3# DB   1/4 track x 1   With punching   1/4 track x 3 laps     Walking with alt cross body UE taps to targets (colored cones) called out by PT   To facility turn extension and rotation   1/4 track x 4 laps     Dual tasks dribbling basketball   Swithing hands for emphasis on trunk rotations 1/4 track x 4 laps     Pall toss into hoop overhead with reinforcement of upright BIG posture x 10 Solo f  3# B/L AW Fwd/back walking with bounce pass with PT 1/4 track x 4 laps     Side  stepping with self bounce pass 1/4 track x 4 laps  Step ups // bars B/L UE  6inch   Fwd 15x ea   Lateral x 10 ea  Slow to progress through activity due to slow processing and difficulty w/motor planing In SOLO: 6 inch block fwd step ups x15 ea side, lat x10 ea side, Vcs for sequence and technique Solo   6inch  Step up  W/SPC  Fwd  X 10 ea    Vcs for sequence and technique   Stairs         Boxing          Posture  OH peach Tbar  X 20 focus on upright posture reinforcement   Education reviewed proper posture strategy with activities t/o session   Cues for upright posture with activities    STS   STS managing basketball with   Standing with HKM dribbling 4 x, then sit down holding basketball    X 10 ea 2x10 with cueing for fwd weight shift, upright posture and controlled descent      Blazepods         Trunk rotation         Ther Ex        Bike   Nu step   L6 x 10 min for cardio endurance, mobility and ROM  NuStep L6 x10 min for cardio endurance, mobility, ROM    Treadmill  At end of session   Solo b/L UE   1.5 mph   5 min  (due to time constraints) Solo  B/L UE  1.0mph-1.3-1.2mph  3# B/L AW   X 11 min    Cues for increase step height and length to help with efficiency of gait      Solo B/L UE  1.5 mph  5min x 2                                                    Ther Activity        Bed mobility                 Gait Training        Treadmill         Level surface         Modalities

## 2024-07-01 NOTE — PROGRESS NOTES
Daily Note     Today's date: 2024  Patient name: Robi Bello  : 1940  MRN: [id number]  Referring provider: Joaquim Dickerson MD  Dx:   Encounter Diagnosis     ICD-10-CM    1. Balance disorder  R26.89       2. Parkinson's disease, unspecified whether dyskinesia present, unspecified whether manifestations fluctuate  G20.A1                      Subjective: Feels his head isn't working well wit his body today.      Objective: See treatment diary below      Assessment: Tolerated treatment well. Patient demonstrated fatigue post treatment and would benefit from continued PT. With increased confusion and limited safety awareness today. Good engagement with salient activity, with good opportunities for error and self correction.       Plan: Continue per plan of care.      Short Term Goal Expiration Date:(2024  Long Term Goal Expiration Date: (2024)  POC Expiration Date: (2024)    Access Code: [email or web address]  URL: [email or web address]  Date: 2024  Prepared by: Gerda Bolanos    Exercises performed 2024  - Standing March with Counter Support  - 1 x daily - 7 x weekly - 3 sets - 10 reps  - Heel Raises with Counter Support  - 1 x daily - 7 x weekly - 3 sets - 10 reps  - Side Stepping with Counter Support  - 1 x daily - 7 x weekly - 3 sets - 10 reps  - Backward Walking with Counter Support  - 1 x daily - 7 x weekly - 3 sets - 10 reps    Visit count: 3 of 10; PN due 2024    POC expires Unit limit Auth Expiration date PT/OT/ST + Visit Limit?   2024 N/A N/A BOMN                           Visit/Unit Tracking  AUTH Status:  Date 03/25 03/28 04/02 04/04 04/09 04/11 04/16 04/18 04/25  PN    BOMN Used 1 2 3 4 5 6 7 8 9 1 2 3 4    Remaining  9 8 7 6 5 4 3 2 1 9 8 7 6     AUTH Status:  Date 05/14 05/16 05/21 05/23 05/28 5/30 06/03 06/04 06/06 06/10 06/11 06/17    BOMN Used 5 6 7 1 2 3 4 5 6 7 8 9     Remaining  5 4 3 9 8 7 6 5 4 3 2 1      AUTH  Status:  Date 6/18 06/20 06/24 06/25 6/27 07/01 07/02         BOMN Used 1 2 3 4 5 6 7          Remaining  9 8 7 6 5 4 3             Authorized (3/25/2024-12/31/2024)  Visits Requested Visits Authorized Visits Completed Visits Scheduled   [phone]         PRECAUTIONS: PD, Fall Risk, Slow processing, Dementia, [place]    Manuals 07/02 06/24 06/25 6/27 07/01                                   Neuro Re-Ed         Hurdles   Solo   No UE  3# b/L AW   6in (x4)  Fwd non reciprocal   X 2 laps    With blockers along the outside to emphasize stepping over devna vs. Around with good affect  Reciprocally x 4 laps  Lateral x 4 laps  10ft //bars   6inch hurdles  (5)  Fwd non reciprocal x 3 laps   Reciprocally x 4 laps     Lateral   X 3 laps cues and demo for proper foot placement and sequencing  In SOLO:  6 inch hurdles (5) fwd x5 laps, Vcs for inc clearance with trailing leg    6 inch hurdles (5) lat x3 laps, Vcs for technique and sequence Solo 1/4 track  6 inch hurdles (8) lat FWD: x4 laps,   Non reciprocally  Vcs for technique and sequence    Lat: x4 laps  Vcs for technique and sequence   HKM   10ft //bars   Fwd/lat x 2 laps each UE PRN  In SOLO: 1/4 laps x6, Vcs for inc excursion    Dynamic balance  Solo  Dribbling ball fwd/back 1/4 track x 4 laps ea total throughout session       Bounce pass fwd/back walking   1/4 track x 2 laps each       Bounce and self catch with rotation to colored hex ladder with focus on trunk ROT  Until fatigue     Bounce pass in colored hex ladder on therapist command x 20     Self bounce pass to colored hex ladder on therapist command with turning until fatigue    Solo f  3# B/L AW Fwd/back walking with bounce pass with PT 1/4 track x 4 laps     Side stepping with self bounce pass 1/4 track x 4 laps  Step ups // bars B/L UE  6inch   Fwd 15x ea   Lateral x 10 ea  Slow to progress through activity due to slow processing and difficulty w/motor planing In SOLO: 6 inch block fwd step ups x15 ea side, lat  x10 ea side, Vcs for sequence and technique Solo   6inch  Step up  W/SPC  Fwd  X 10 ea    Vcs for sequence and technique   Stairs         Boxing          Posture    Education reviewed proper posture strategy with activities t/o session   Cues for upright posture with activities    STS   STS managing basketball with   Standing with HKM dribbling 4 x, then sit down holding basketball    X 10 ea 2x10 with cueing for fwd weight shift, upright posture and controlled descent      Blazepods         Trunk rotation         Ther Ex        Bike   Nu step   L6 x 10 min for cardio endurance, mobility and ROM  NuStep L6 x10 min for cardio endurance, mobility, ROM    Treadmill  Solo B/L UE 1.5 mph   3.0% incline   X 5 min x2 Solo  B/L UE  1.0mph-1.3-1.2mph  3# B/L AW   X 11 min    Cues for increase step height and length to help with efficiency of gait      Solo B/L UE  1.5 mph  5min x 2                                                    Ther Activity        Bed mobility                 Gait Training        Treadmill         Level surface         Modalities

## 2024-07-02 ENCOUNTER — OFFICE VISIT (OUTPATIENT)
Dept: SPEECH THERAPY | Facility: CLINIC | Age: 84
End: 2024-07-02
Payer: COMMERCIAL

## 2024-07-02 ENCOUNTER — OFFICE VISIT (OUTPATIENT)
Dept: PHYSICAL THERAPY | Facility: CLINIC | Age: 84
End: 2024-07-02
Payer: COMMERCIAL

## 2024-07-02 DIAGNOSIS — G20.A1 PARKINSON'S DISEASE, UNSPECIFIED WHETHER DYSKINESIA PRESENT, UNSPECIFIED WHETHER MANIFESTATIONS FLUCTUATE: ICD-10-CM

## 2024-07-02 DIAGNOSIS — R48.8 OTHER SYMBOLIC DYSFUNCTIONS: Primary | ICD-10-CM

## 2024-07-02 DIAGNOSIS — R26.89 BALANCE DISORDER: Primary | ICD-10-CM

## 2024-07-02 DIAGNOSIS — G20.A1 MODERATE DEMENTIA DUE TO PARKINSON'S DISEASE, UNSPECIFIED WHETHER BEHAVIORAL, PSYCHOTIC, OR MOOD DISTURBANCE OR ANXIETY (HCC): ICD-10-CM

## 2024-07-02 DIAGNOSIS — F02.B0 MODERATE DEMENTIA DUE TO PARKINSON'S DISEASE, UNSPECIFIED WHETHER BEHAVIORAL, PSYCHOTIC, OR MOOD DISTURBANCE OR ANXIETY (HCC): ICD-10-CM

## 2024-07-02 PROCEDURE — 97112 NEUROMUSCULAR REEDUCATION: CPT | Performed by: PHYSICAL THERAPIST

## 2024-07-02 PROCEDURE — 97110 THERAPEUTIC EXERCISES: CPT | Performed by: PHYSICAL THERAPIST

## 2024-07-02 PROCEDURE — 92507 TX SP LANG VOICE COMM INDIV: CPT

## 2024-07-02 NOTE — PROGRESS NOTES
Daily Speech Treatment Note    Today's date: 2024   Patient’s name: Robi Bello  : 1940  MRN: [id number]  Safety measures: PD, Fall Risk  Referring provider: Joauqim Dickerson MD    Encounter Diagnosis     ICD-10-CM    1. Other symbolic dysfunctions  R48.8       2. Moderate dementia due to Parkinson's disease, unspecified whether behavioral, psychotic, or mood disturbance or anxiety ([facility])  G20.A1     F02.B0         Visit Tracking:  Re-eval Due POC Expires Auth Expiration Date ST Visit Limit   2024 BOMN   2024 BOMN                     Visit/Unit Tracking  AUTH Status:  Date 2024      BOMN Used 8 9 10 11       Remaining  BOMN BOMN BOMN BOMN          Subjective/Behavioral:  -Patient appeared to be in such a good mood today!!  He was very engaged in activities, participated in conversation, and was making jokes throughout session.     Objective/Assessment:  -Reviewed testing results and goals in plan care with patient. Patient is in agreement at this time.      Short Term Goals  1. Patient will complete word generation tasks (e.g., categorical naming, sentence/phrase completion, word deduction, definitions, etc.) at 75% accuracy with min semantic and phonemic cues from clinician as needed to target the maintenance of his/her expressive vocabulary, as well as promote socialization and safety. --ONGOING    2. Patient will complete cognitive-linguistic activities (e.g., verbal and visual problem solving scenarios, sequencing, reasoning, etc.) at 75% accuracy with min semantic cues from clinician as needed to target the maintenance of his/her cognitive-linguistic functioning, as well as promote safety and overall QOL. --ONGOING    To target auditory attention: Patient was presented with complex directions (e.g. divide the triangle in half, write your name above the [place]) and was instructed to complete.  He  "completed this task over 25/30 (83%) opportunities independently, increasing to 30/30 (100%) opportunities given one repetition.     To target thought organization: Patient was presented with two words and was asked to identify the one letter that occurred in the first word, but not the second (i.e., \"this letter is in RAPID, but not in DAIRY\"___P).  Due to complexity of this task, Clinician guided Patient through it.  When Clinician went letter by letter and crossed out overlapping letters, Patient was able to identify the target.  He completed this task in 10/10 (100%) opportunities given mod verbal and visual cues.     To target thought organization: Patient was verbally presented with a statement where he had to name what all items have in common (e.g. All heaters have ____).  He completed this task in 26/30 (87%) opportunities independently, increasing to 30/30 (100%) opportunities given min verbal cues.      Plan:  -Patient was provided with home exercises/activities to target goals in plan of care at the end of today's session.  -Continue with current plan of care.  "

## 2024-07-08 ENCOUNTER — OFFICE VISIT (OUTPATIENT)
Dept: SPEECH THERAPY | Facility: CLINIC | Age: 84
End: 2024-07-08
Payer: COMMERCIAL

## 2024-07-08 ENCOUNTER — OFFICE VISIT (OUTPATIENT)
Dept: PHYSICAL THERAPY | Facility: CLINIC | Age: 84
End: 2024-07-08
Payer: COMMERCIAL

## 2024-07-08 DIAGNOSIS — R48.8 OTHER SYMBOLIC DYSFUNCTIONS: Primary | ICD-10-CM

## 2024-07-08 DIAGNOSIS — G20.A1 MODERATE DEMENTIA DUE TO PARKINSON'S DISEASE, UNSPECIFIED WHETHER BEHAVIORAL, PSYCHOTIC, OR MOOD DISTURBANCE OR ANXIETY (HCC): ICD-10-CM

## 2024-07-08 DIAGNOSIS — G20.A1 PARKINSON'S DISEASE, UNSPECIFIED WHETHER DYSKINESIA PRESENT, UNSPECIFIED WHETHER MANIFESTATIONS FLUCTUATE: ICD-10-CM

## 2024-07-08 DIAGNOSIS — R26.89 BALANCE DISORDER: Primary | ICD-10-CM

## 2024-07-08 DIAGNOSIS — F02.B0 MODERATE DEMENTIA DUE TO PARKINSON'S DISEASE, UNSPECIFIED WHETHER BEHAVIORAL, PSYCHOTIC, OR MOOD DISTURBANCE OR ANXIETY (HCC): ICD-10-CM

## 2024-07-08 PROCEDURE — 92507 TX SP LANG VOICE COMM INDIV: CPT

## 2024-07-08 PROCEDURE — 97112 NEUROMUSCULAR REEDUCATION: CPT | Performed by: PHYSICAL THERAPIST

## 2024-07-08 PROCEDURE — 97110 THERAPEUTIC EXERCISES: CPT | Performed by: PHYSICAL THERAPIST

## 2024-07-08 NOTE — PROGRESS NOTES
Daily Note     Today's date: 2024  Patient name: Robi Bello  : 1940  MRN: [id number]  Referring provider: Joaquim Dickerson MD  Dx:   Encounter Diagnosis     ICD-10-CM    1. Balance disorder  R26.89       2. Parkinson's disease, unspecified whether dyskinesia present, unspecified whether manifestations fluctuate  G20.A1                      Subjective: notes he only slept 4-5 hours last night feels overly fatigued, like he used everything up. Not doing well today possibly due to heat, notes he has been drinking water, but thinks he could have drank more yesterday      Objective: See treatment diary below      Assessment: Tolerated treatment fair. Patient demonstrated fatigue post treatment and would benefit from continued PT. Challenged with marching and twisting focus on lumbar mobility and motor coordination. Despite feelings of fatigued tolerated activities well today.       Plan: Continue per plan of care.      Short Term Goal Expiration Date:(2024  Long Term Goal Expiration Date: (2024)  POC Expiration Date: (2024)    Access Code: [email or web address]  URL: [email or web address]  Date: 2024  Prepared by: Gerda Bolanos    Exercises performed 2024  - Standing March with Counter Support  - 1 x daily - 7 x weekly - 3 sets - 10 reps  - Heel Raises with Counter Support  - 1 x daily - 7 x weekly - 3 sets - 10 reps  - Side Stepping with Counter Support  - 1 x daily - 7 x weekly - 3 sets - 10 reps  - Backward Walking with Counter Support  - 1 x daily - 7 x weekly - 3 sets - 10 reps    Visit count: 8 of 10; PN due 2024    POC expires Unit limit Auth Expiration date PT/OT/ST + Visit Limit?   2024 N/A N/A BOMN                           Visit/Unit Tracking  AUTH Status:  Date 03/25 03/28 04/02 04/04 04/09 04/11 04/16 04/18 04/25  PN    BOMN Used 1 2 3 4 5 6 7 8 9 1 2 3 4    Remaining  9 8 7 6 5 4 3 2 1 9 8 7 6     AUTH Status:  Date   05/16 05/21 05/23 05/28 5/30 06/03 06/04 06/06 06/10 06/11 06/17    BOMN Used 5 6 7 1 2 3 4 5 6 7 8 9     Remaining  5 4 3 9 8 7 6 5 4 3 2 1      AUTH Status:  Date 6/18 06/20 06/24 06/25 6/27 07/01 07/02 07/08        BOMN Used 1 2 3 4 5 6 7 8         Remaining  9 8 7 6 5 4 3 2            Authorized (3/25/2024-12/31/2024)  Visits Requested Visits Authorized Visits Completed Visits Scheduled   [phone]         PRECAUTIONS: PD, Fall Risk, Slow processing, Dementia, [place]    Manuals 07/02 07/08 6/27 07/01                                   Neuro Re-Ed         Hurdles     In SOLO:  6 inch hurdles (5) fwd x5 laps, Vcs for inc clearance with trailing leg    6 inch hurdles (5) lat x3 laps, Vcs for technique and sequence Solo 1/4 track  6 inch hurdles (8) lat FWD: x4 laps,   Non reciprocally  Vcs for technique and sequence    Lat: x4 laps  Vcs for technique and sequence   HKM  Solo   With pool noodle and twist   1/4 track x 4 laps   Fwd and  back ea   In SOLO: 1/4 laps x6, Vcs for inc excursion    Dynamic balance  Solo  Dribbling ball fwd/back 1/4 track x 4 laps ea total throughout session       Bounce pass fwd/back walking   1/4 track x 2 laps each       Bounce and self catch with rotation to colored hex ladder with focus on trunk ROT  Until fatigue     Bounce pass in colored hex ladder on therapist command x 20     Self bounce pass to colored hex ladder on therapist command with turning until fatigue      In SOLO: 6 inch block fwd step ups x15 ea side, lat x10 ea side, Vcs for sequence and technique Solo   6inch  Step up  W/SPC  Fwd  X 10 ea    Vcs for sequence and technique   Stairs         Boxing          Posture      Cues for upright posture with activities    STS   No UE as able   With noodle OH  And alt march   To sit down no UE    2x10      Blazepods         Trunk rotation         Ther Ex        Bike    NuStep L6 x10 min for cardio endurance, mobility, ROM    Treadmill  Solo B/L UE 1.5 mph   3.0% incline   X 5 min  x2 Solo B/L UE for endurance 1.5 -1.3mph     X 5 min x2   Solo B/L UE  1.5 mph  5min x 2                                                    Ther Activity        Bed mobility                 Gait Training        Treadmill         Level surface         Modalities

## 2024-07-09 ENCOUNTER — OFFICE VISIT (OUTPATIENT)
Dept: PHYSICAL THERAPY | Facility: CLINIC | Age: 84
End: 2024-07-09
Payer: COMMERCIAL

## 2024-07-09 DIAGNOSIS — R26.89 BALANCE DISORDER: Primary | ICD-10-CM

## 2024-07-09 DIAGNOSIS — G20.A1 PARKINSON'S DISEASE, UNSPECIFIED WHETHER DYSKINESIA PRESENT, UNSPECIFIED WHETHER MANIFESTATIONS FLUCTUATE: ICD-10-CM

## 2024-07-09 PROCEDURE — 97110 THERAPEUTIC EXERCISES: CPT | Performed by: PHYSICAL THERAPIST

## 2024-07-09 PROCEDURE — 97112 NEUROMUSCULAR REEDUCATION: CPT | Performed by: PHYSICAL THERAPIST

## 2024-07-09 PROCEDURE — 97150 GROUP THERAPEUTIC PROCEDURES: CPT | Performed by: PHYSICAL THERAPIST

## 2024-07-09 NOTE — PROGRESS NOTES
"Daily Note     Today's date: 2024  Patient name: Robi Bello  : 1940  MRN: [id number]  Referring provider: Joaquim Dickerson MD  Dx:   Encounter Diagnosis     ICD-10-CM    1. Balance disorder  R26.89       2. Parkinson's disease, unspecified whether dyskinesia present, unspecified whether manifestations fluctuate  G20.A1                      Subjective: \"I feel like I am getting worse\"      Objective: See treatment diary below  Grouped 2: x 10 min   215-2 45 1:1 x 30 min     Assessment: Tolerated treatment well. Patient demonstrated fatigue post treatment and would benefit from continued PT. Increased fatigue with increased hot weather, opted for nu-step vs treadmill today to minimize exertion in the heat. Prompting for dosage activity progression required to stay on task, cues for safety and sequencing. Confused and easily distracted in semi busy environment.      Plan: Continue per plan of care.      Short Term Goal Expiration Date:(2024  Long Term Goal Expiration Date: (2024)  POC Expiration Date: (2024)    Access Code: [email or web address]  URL: [email or web address]  Date: 2024  Prepared by: Gerda Bolanos    Exercises performed 2024  - Standing March with Counter Support  - 1 x daily - 7 x weekly - 3 sets - 10 reps  - Heel Raises with Counter Support  - 1 x daily - 7 x weekly - 3 sets - 10 reps  - Side Stepping with Counter Support  - 1 x daily - 7 x weekly - 3 sets - 10 reps  - Backward Walking with Counter Support  - 1 x daily - 7 x weekly - 3 sets - 10 reps    Visit count: 8 of 10; PN due 2024    POC expires Unit limit Auth Expiration date PT/OT/ST + Visit Limit?   2024 N/A N/A BOMN                           Visit/Unit Tracking  AUTH Status:  Date 03/25 03/28 04/02 04/04 04/09 04/11 04/16 04/18 04/25  PN    BOMN Used 1 2 3 4 5 6 7 8 9 1 2 3 4    Remaining  9 8 7 6 5 4 3 2 1 9 8 7 6     AUTH Status:  Date  " 05/21 05/23 05/28 5/30 06/03 06/04 06/06 06/10 06/11 06/17    BOMN Used 5 6 7 1 2 3 4 5 6 7 8 9     Remaining  5 4 3 9 8 7 6 5 4 3 2 1      AUTH Status:  Date 6/18 06/20 06/24 06/25 6/27 07/01 07/02 07/08 07/09       BOMN Used 1 2 3 4 5 6 7 8 9        Remaining  9 8 7 6 5 4 3 2 1           Authorized (3/25/2024-12/31/2024)  Visits Requested Visits Authorized Visits Completed Visits Scheduled   [phone]         PRECAUTIONS: PD, Fall Risk, Slow processing, Dementia, [place]    Manuals 07/02 07/08 07/09 6/27 07/01                                   Neuro Re-Ed         Hurdles    15ft // bars  6in (x8)  Fwd x 4 laps  Lat x 4 laps  In SOLO:  6 inch hurdles (5) fwd x5 laps, Vcs for inc clearance with trailing leg    6 inch hurdles (5) lat x3 laps, Vcs for technique and sequence Solo 1/4 track  6 inch hurdles (8) lat FWD: x4 laps,   Non reciprocally  Vcs for technique and sequence    Lat: x4 laps  Vcs for technique and sequence   HKM  Solo   With pool noodle and twist   1/4 track x 4 laps   Fwd and  back ea   In SOLO: 1/4 laps x6, Vcs for inc excursion    Dynamic balance  Solo  Dribbling ball fwd/back 1/4 track x 4 laps ea total throughout session       Bounce pass fwd/back walking   1/4 track x 2 laps each       Bounce and self catch with rotation to colored hex ladder with focus on trunk ROT  Until fatigue     Bounce pass in colored hex ladder on therapist command x 20     Self bounce pass to colored hex ladder on therapist command with turning until fatigue     15ft // bars  Backward  B/L UE  X4 laps    Walking HT/HN  B/L UE  X 4 laps each  In SOLO: 6 inch block fwd step ups x15 ea side, lat x10 ea side, Vcs for sequence and technique Solo   6inch  Step up  W/SPC  Fwd  X 10 ea    Vcs for sequence and technique   Stairs         Boxing          Posture      Cues for upright posture with activities    STS   No UE as able   With noodle OH  And alt march   To sit down no UE    2x10 No UE 10 x 2      Blazepods         Trunk  rotation         Ther Ex        Bike    NuStep L6 x10 min for cardio endurance, mobility, ROM    Treadmill  Solo B/L UE 1.5 mph   3.0% incline   X 5 min x2 Solo B/L UE for endurance 1.5 -1.3mph     X 5 min x2   Solo B/L UE  1.5 mph  5min x 2                                                    Ther Activity        Bed mobility                 Gait Training        Treadmill         Level surface         Modalities

## 2024-07-15 ENCOUNTER — OFFICE VISIT (OUTPATIENT)
Dept: PHYSICAL THERAPY | Facility: CLINIC | Age: 84
End: 2024-07-15
Payer: COMMERCIAL

## 2024-07-15 DIAGNOSIS — R26.89 BALANCE DISORDER: Primary | ICD-10-CM

## 2024-07-15 DIAGNOSIS — G20.A1 PARKINSON'S DISEASE, UNSPECIFIED WHETHER DYSKINESIA PRESENT, UNSPECIFIED WHETHER MANIFESTATIONS FLUCTUATE: ICD-10-CM

## 2024-07-15 PROCEDURE — 97112 NEUROMUSCULAR REEDUCATION: CPT | Performed by: PHYSICAL THERAPIST

## 2024-07-15 PROCEDURE — 97164 PT RE-EVAL EST PLAN CARE: CPT | Performed by: PHYSICAL THERAPIST

## 2024-07-15 NOTE — LETTER
2024    Joaquim Dickerson MD  [address] [place] [id number]    Patient: Robi Bello   YOB: 1940   Date of Visit: 7/15/2024     Encounter Diagnosis     ICD-10-CM    1. Balance disorder  R26.89       2. Parkinson's disease, unspecified whether dyskinesia present, unspecified whether manifestations fluctuate  G20.A1           Dear Dr. Dickerson:    Thank you for your recent referral of Robi Bello. Please review the attached evaluation summary from Robi's recent visit.     Please verify that you agree with the plan of care by signing the attached order.     If you have any questions or concerns, please do not hesitate to call.     I sincerely appreciate the opportunity to share in the care of one of your patients and hope to have another opportunity to work with you in the near future.       Sincerely,    Gerda Bolanos, PT      Referring Provider:      I certify that I have read the below Plan of Care and certify the need for these services furnished under this plan of treatment while under my care.                    Joaquim Dickerson MD  [address] [place] [id number]  Via Fax: [phone]          Re-Evaluation     Today's date: 7/15/2024  Patient name: Robi Bello  : 1940  MRN: [id number]  Referring provider: Joaquim Dickerson MD  Dx:   Encounter Diagnosis     ICD-10-CM    1. Balance disorder  R26.89       2. Parkinson's disease, unspecified whether dyskinesia present, unspecified whether manifestations fluctuate  G20.A1           Start Time: 1500  Stop Time: 1545  Total time in clinic (min): 45 minutes      Assessment: Progress update performed today as patient POC to . He did not demonstrate progress  but able to demonstrate maintenance in function. Although he notes he tries to do more daughter notes he doesn't get out to walk much out of the house due to the heat and her mom being stressed with moving. Patient continues to present as a  fall risk with decreased safety awareness, difficulty with following simple instructions and difficulty with sequencing. Patient continues to demonstrated slow processing and cognitive decline which could potentially be inhibiting his progress in therapy. Due to his decreased cognition, slow processing and progressive, dementia and progressive nature of his disorder he would benefit from continued therapy in a therapeutic maintenance program. Patient daughter agrees with this and will contact facility if patient and wife would like to move forward with new plan.     Impairments: abnormal coordination, abnormal gait, abnormal movement, activity intolerance, impaired balance, impaired physical strength, lacks appropriate home exercise program, safety issue and poor posture   Barriers to therapy: Limited transportation, history of migraines and Meniere disease  Understanding of Dx/Px/POC: good   Prognosis: good    GOALS AS OF 05/23/2024  STG:  Patient will achieve gait speed of 1.0 m/s to demonstrate community ambulation within 4 weeks - NOT MET   Patient will be complaint with increasing activity level at home for improved endurance within 4 weeks - Partially MET     LTG  Patient will increased 6 MWT by 200ft or great within 8 weeks - NOT MET   Patient will improve  FGA by 4 points or greater demonstrating significant improvements in balance within 8 weeks - NOT MET     NEW GOALS AS O 07/15/2024:  Patient will maintain endurance demonstrating 6 MWT between 600-700ft within 8 weeks  Patient will maintain 5x STS between 13-15s within 8 weeks  Patient will maintain TUG Without AD as 15s within 8 weeks     Plan  Patient would benefit from: skilled physical therapy  Referral necessary: No  Planned therapy interventions: home exercise program, graded motor, graded exercise, graded activity, gait training, flexibility, functional ROM exercises, community reintegration, ADL training, balance, motor coordination training,  "neuromuscular re-education, patient education, postural training, sensory integrative techniques, strengthening, stretching, therapeutic activities, therapeutic exercise, therapeutic training and transfer training  Frequency: 2x/week  Duration in weeks: 8  Plan of Care beginning date: 07/15/2024  Plan of Care expiration date: 09/09/2024  Treatment plan discussed with: patient and family    Subjective: Reports he is attempting some movement at home, has difficulty stepping over objects. August 2nd moving in assisted living. Notes he would like to take a break from PT  Goal: \"I don't know\"   Pain:  LBP, depends on how he is lying 4/10 at worst, current 2/10      Objective: See treatment diary below    Gait Assessment  04/25/24 05/21 05/23 07/15   6 Minute Walk Test (ft): 750 ft without RW   Post: HR 87 O2 99% 615ft w/SPC 700ft  w/o SPC    HR 76  O2 97%  615ft  W/SPC  RPE 5/10  HR 78  O2: 96%     2 Minute Walk Test (ft):       Gait Speed (ft/s): 20 ft / 6 sec = 1.02 m/s  20ft/10s w/SPC    20ft/8.3s w/SPC = 2.4ft/s  0.73 m/s   20ft/7.3s w/o SPC=2.7ft/s  0.83 m/s 20ft/9.05s =2.2ft/s  W/SPC  .67 m/s  20ft/ 8.3 = 2.4ft/s W/o SPC   0.73 m/s     TUG TUG Score: 23 sec (No AD)  Tug COG Score: 26 cog (No AD) 24s W/AD  22a no AD  17.7s W/AD  18.0s W/O AD  W/cog task: 20.8s with inconsistent count and passes target  20.6s W/AD  14.9s w/0 AD  19.9s w/o AD and cog task, unable to maintain cog task   FGA (<22/30 increased fall risk) 19/30  16/30    Sit to Stand Assessment  5xSTS score (time): 40 sec no UE  W/UE 14s   14.5s w/UE 13.5s w/UE  Unable w/o UE   Balance:       Modified Clinical Test of Sensory Interaction on Balance   Firm surface:  EO 30 sec   EC 30 sec   FOAM surface  EO 30 sec   EC 11sec, 30 sec                Plan of Care beginning date: 07/15/2024  Plan of Care expiration date: 09/09/2024    Visit count: 1 of 10; PN due 09/09/2024    POC expires Unit limit Auth Expiration date PT/OT/ST + Visit Limit?   05/20/2024 N/A " "N/A BOMN                           Visit/Unit Tracking  AUTH Status:  Date 03/25 03/28 04/02 04/04 04/09 04/11 04/16 04/18 04/25  PN 04/30 05/01 05/07 05/09   BOMN Used 1 2 3 4 5 6 7 8 9 1 2 3 4    Remaining  9 8 7 6 5 4 3 2 1 9 8 7 6     AUTH Status:  Date 05/14 05/16 05/21 05/23            BOMN Used 5 6 7 1             Remaining  5 4 3 9                AUTH Status:  Date 6/18 06/20 06/24 06/25 6/27 07/01 07/02 07/08 07/09 07/15      BOMN Used 1 2 3 4 5 6 7 8 9 1       Remaining  9 8 7 6 5 4 3 2 1 9            Auth  Authorized (3/25/2024-12/31/2024)  Visits Requested Visits Authorized Visits Completed Visits Scheduled   [phone]         Manuals 5/21 05/23 05/09 05/14 05/16                                   Neuro Re-Ed    Pt education on reasons lightheaded symptoms may be happening and strategies and safety measures to use when feeling lightheaded.      Hurdles     Solo   Low  W/SPC  Non reciprocally   Fwdx4 laps    Lat  x 2 laps  Solo    Obstacles of varying width and height (pool noodles crossed, bolsters x2 and foam beam)    Fwd x 5 laps no UE   Lat x 4 laps    HKM   Solo   1/4 track   Reciprocal arm swing   X 4 laps    Solo   1/4 track   Reciprocal arm swing     X 1 lap with demo   X2 laps no UE  X 2  laps w/UE    Dynamic balance    Solo   1/4 track   Bwd walking   X 3 laps     Solo   1/4 track   Lateral stepping   X 3 laps  Solo   1/4 track   Bwd walking   X 4 laps  Trunk ROT with physio ball ball toss   X5 ea   STS   B/L UE   1 x 10   Single UE  2x10   Cues for upright posture in standing and controlled descent    Stairs         Boxing         Daily maximal exercises         Diaphragmatic breathing         Bed mobility    Rolling   Max cues for technique to reach and cross leg towards side pt rolls to   X 5 each side     Sidelying <> Sit transfers   X 3   MinAx1      Open books         Blazepods    Seated on low mat table   2 blazepods on either side of pt   \"[place]\" ball lifts to hit blazepod either side "   2 x 1:30 rounds   Trial 1: 8 hits   Trial 2: 12 hits      Trunk rotation    Solo   1/4 track   Passing red physioball with SPT with emphasis on trunk rotation and big UE reaching  X 10 each side     Supine   Lower trunk rotations   X 10 each side      Ther Ex    BP pre bike  135/80    Attempted recumbent unable         Bike    Nustep  L5 x 10 min for endurance training                                                             Ther Activity        Bed mobility                 Gait Training        Treadmill      1.0 mph  B/L UE   VC for increase step height and length     5min x2  HR 85   O2 96%   Level surface     CS in/out of clinic with cues to carry over big steps with use SPC  100ft x2    Modalities

## 2024-07-15 NOTE — PROGRESS NOTES
Re-Evaluation     Today's date: 7/15/2024  Patient name: Robi Bello  : 1940  MRN: [id number]  Referring provider: Joaquim Dickerson MD  Dx:   Encounter Diagnosis     ICD-10-CM    1. Balance disorder  R26.89       2. Parkinson's disease, unspecified whether dyskinesia present, unspecified whether manifestations fluctuate  G20.A1           Start Time: 1500  Stop Time: 1545  Total time in clinic (min): 45 minutes      Assessment: Progress update performed today as patient POC to . He did not demonstrate progress  but able to demonstrate maintenance in function. Although he notes he tries to do more daughter notes he doesn't get out to walk much out of the house due to the heat and her mom being stressed with moving. Patient continues to present as a fall risk with decreased safety awareness, difficulty with following simple instructions and difficulty with sequencing. Patient continues to demonstrated slow processing and cognitive decline which could potentially be inhibiting his progress in therapy. Due to his decreased cognition, slow processing and progressive, dementia and progressive nature of his disorder he would benefit from continued therapy in a therapeutic maintenance program. Patient daughter agrees with this and will contact facility if patient and wife would like to move forward with new plan.     Impairments: abnormal coordination, abnormal gait, abnormal movement, activity intolerance, impaired balance, impaired physical strength, lacks appropriate home exercise program, safety issue and poor posture   Barriers to therapy: Limited transportation, history of migraines and Meniere disease  Understanding of Dx/Px/POC: good   Prognosis: good    GOALS AS OF 2024  STG:  Patient will achieve gait speed of 1.0 m/s to demonstrate community ambulation within 4 weeks - NOT MET   Patient will be complaint with increasing activity level at home for improved endurance within 4 weeks -  "Partially MET     LTG  Patient will increased 6 MWT by 200ft or great within 8 weeks - NOT MET   Patient will improve  FGA by 4 points or greater demonstrating significant improvements in balance within 8 weeks - NOT MET     NEW GOALS AS O 07/15/2024:  Patient will maintain endurance demonstrating 6 MWT between 600-700ft within 8 weeks  Patient will maintain 5x STS between 13-15s within 8 weeks  Patient will maintain TUG Without AD as 15s within 8 weeks     Plan  Patient would benefit from: skilled physical therapy  Referral necessary: No  Planned therapy interventions: home exercise program, graded motor, graded exercise, graded activity, gait training, flexibility, functional ROM exercises, community reintegration, ADL training, balance, motor coordination training, neuromuscular re-education, patient education, postural training, sensory integrative techniques, strengthening, stretching, therapeutic activities, therapeutic exercise, therapeutic training and transfer training  Frequency: 2x/week  Duration in weeks: 8  Plan of Care beginning date: 07/15/2024  Plan of Care expiration date: 09/09/2024  Treatment plan discussed with: patient and family    Subjective: Reports he is attempting some movement at home, has difficulty stepping over objects. August 2nd moving in assisted living. Notes he would like to take a break from PT  Goal: \"I don't know\"   Pain:  LBP, depends on how he is lying 4/10 at worst, current 2/10      Objective: See treatment diary below    Gait Assessment  04/25/24 05/21 05/23 07/15   6 Minute Walk Test (ft): 750 ft without RW   Post: HR 87 O2 99% 615ft w/SPC 700ft  w/o SPC    HR 76  O2 97%  615ft  W/SPC  RPE 5/10  HR 78  O2: 96%     2 Minute Walk Test (ft):       Gait Speed (ft/s): 20 ft / 6 sec = 1.02 m/s  20ft/10s w/SPC    20ft/8.3s w/SPC = 2.4ft/s  0.73 m/s   20ft/7.3s w/o SPC=2.7ft/s  0.83 m/s 20ft/9.05s =2.2ft/s  W/SPC  .67 m/s  20ft/ 8.3 = 2.4ft/s W/o SPC   0.73 m/s     TUG TUG Score: " 23 sec (No AD)  Tug COG Score: 26 cog (No AD) 24s W/AD  22a no AD  17.7s W/AD  18.0s W/O AD  W/cog task: 20.8s with inconsistent count and passes target  20.6s W/AD  14.9s w/0 AD  19.9s w/o AD and cog task, unable to maintain cog task   FGA (<22/30 increased fall risk) 19/30 16/30    Sit to Stand Assessment  5xSTS score (time): 40 sec no UE  W/UE 14s   14.5s w/UE 13.5s w/UE  Unable w/o UE   Balance:       Modified Clinical Test of Sensory Interaction on Balance   Firm surface:  EO 30 sec   EC 30 sec   FOAM surface  EO 30 sec   EC 11sec, 30 sec                Plan of Care beginning date: 07/15/2024  Plan of Care expiration date: 09/09/2024    Visit count: 1 of 10; PN due 09/09/2024    POC expires Unit limit Auth Expiration date PT/OT/ST + Visit Limit?   05/20/2024 N/A N/A BOMN                           Visit/Unit Tracking  AUTH Status:  Date 03/25 03/28 04/02 04/04 04/09 04/11 04/16 04/18 04/25  PN 04/30 05/01 05/07 05/09   BOMN Used 1 2 3 4 5 6 7 8 9 1 2 3 4    Remaining  9 8 7 6 5 4 3 2 1 9 8 7 6     AUTH Status:  Date 05/14 05/16 05/21 05/23            BOMN Used 5 6 7 1             Remaining  5 4 3 9                AUTH Status:  Date 6/18 06/20 06/24 06/25 6/27 07/01 07/02 07/08 07/09 07/15      BOMN Used 1 2 3 4 5 6 7 8 9 1       Remaining  9 8 7 6 5 4 3 2 1 9            Auth  Authorized (3/25/2024-12/31/2024)  Visits Requested Visits Authorized Visits Completed Visits Scheduled   99 [phone] 17 10         Manuals 5/21 05/23 05/09 05/14 05/16                                   Neuro Re-Ed    Pt education on reasons lightheaded symptoms may be happening and strategies and safety measures to use when feeling lightheaded.      Hurdles     Solo   Low  W/SPC  Non reciprocally   Fwdx4 laps    Lat  x 2 laps  Solo    Obstacles of varying width and height (pool noodles crossed, bolsters x2 and foam beam)    Fwd x 5 laps no UE   Lat x 4 laps    HKM   Solo   1/4 track   Reciprocal arm swing   X 4 laps    Solo   1/4 track  "  Reciprocal arm swing     X 1 lap with demo   X2 laps no UE  X 2  laps w/UE    Dynamic balance    Solo   1/4 track   Bwd walking   X 3 laps     Solo   1/4 track   Lateral stepping   X 3 laps  Solo   1/4 track   Bwd walking   X 4 laps  Trunk ROT with physio ball ball toss   X5 ea   STS   B/L UE   1 x 10   Single UE  2x10   Cues for upright posture in standing and controlled descent    Stairs         Boxing         Daily maximal exercises         Diaphragmatic breathing         Bed mobility    Rolling   Max cues for technique to reach and cross leg towards side pt rolls to   X 5 each side     Sidelying <> Sit transfers   X 3   MinAx1      Open books         Blazepods    Seated on low mat table   2 blazepods on either side of pt   \"[place]\" ball lifts to hit blazepod either side   2 x 1:30 rounds   Trial 1: 8 hits   Trial 2: 12 hits      Trunk rotation    Solo   1/4 track   Passing red physioball with SPT with emphasis on trunk rotation and big UE reaching  X 10 each side     Supine   Lower trunk rotations   X 10 each side      Ther Ex    BP pre bike  135/80    Attempted recumbent unable         Bike    Nustep  L5 x 10 min for endurance training                                                             Ther Activity        Bed mobility                 Gait Training        Treadmill      1.0 mph  B/L UE   VC for increase step height and length     5min x2  HR 85   O2 96%   Level surface     CS in/out of clinic with cues to carry over big steps with use SPC  100ft x2    Modalities                                                              "

## 2024-07-16 ENCOUNTER — OFFICE VISIT (OUTPATIENT)
Dept: PHYSICAL THERAPY | Facility: CLINIC | Age: 84
End: 2024-07-16
Payer: COMMERCIAL

## 2024-07-16 ENCOUNTER — OFFICE VISIT (OUTPATIENT)
Dept: SPEECH THERAPY | Facility: CLINIC | Age: 84
End: 2024-07-16
Payer: COMMERCIAL

## 2024-07-16 DIAGNOSIS — G20.A1 MODERATE DEMENTIA DUE TO PARKINSON'S DISEASE, UNSPECIFIED WHETHER BEHAVIORAL, PSYCHOTIC, OR MOOD DISTURBANCE OR ANXIETY (HCC): ICD-10-CM

## 2024-07-16 DIAGNOSIS — F02.B0 MODERATE DEMENTIA DUE TO PARKINSON'S DISEASE, UNSPECIFIED WHETHER BEHAVIORAL, PSYCHOTIC, OR MOOD DISTURBANCE OR ANXIETY (HCC): ICD-10-CM

## 2024-07-16 DIAGNOSIS — G20.A1 PARKINSON'S DISEASE, UNSPECIFIED WHETHER DYSKINESIA PRESENT, UNSPECIFIED WHETHER MANIFESTATIONS FLUCTUATE: ICD-10-CM

## 2024-07-16 DIAGNOSIS — R48.8 OTHER SYMBOLIC DYSFUNCTIONS: Primary | ICD-10-CM

## 2024-07-16 DIAGNOSIS — R26.89 BALANCE DISORDER: Primary | ICD-10-CM

## 2024-07-16 PROCEDURE — 97110 THERAPEUTIC EXERCISES: CPT | Performed by: PHYSICAL THERAPIST

## 2024-07-16 PROCEDURE — 97112 NEUROMUSCULAR REEDUCATION: CPT | Performed by: PHYSICAL THERAPIST

## 2024-07-16 PROCEDURE — 92507 TX SP LANG VOICE COMM INDIV: CPT

## 2024-07-16 NOTE — PROGRESS NOTES
Daily Note     Today's date: 2024  Patient name: Robi Bello  : 1940  MRN: [id number]  Referring provider: Joaquim Dickerson MD  Dx:   Encounter Diagnosis     ICD-10-CM    1. Balance disorder  R26.89       2. Parkinson's disease, unspecified whether dyskinesia present, unspecified whether manifestations fluctuate  G20.A1                      Subjective: thought he no longer had to come therapy.       Objective: See treatment diary below  HR 74 o2 96%    Assessment: Tolerated treatment well. Patient demonstrated fatigue post treatment and would benefit from continued PT  Initiated maintenance program. Discussed [place] for continued therapy with patient who is in agreement. Solo step used throughout session to maximize speed  In order to induce error and increase efficiency of gait without fear of falling or exterior hand placement needed but patient requires for safety and maximize motor learning.Good challenge with obstacles and dual task. Cues for sequencing and safety awareness as well as to attend to task. Challenged with rotation due to rigidity and poor motor planning. Able to increase to 10 consecutive minutes with decreased gait speed.       Plan: continue with maintenance program      Plan of Care beginning date: 07/15/2024  Plan of Care expiration date: 2024  l  Visit count: 2 of 10; PN due 2024    POC expires Unit limit Auth Expiration date PT/OT/ST + Visit Limit?   2024 N/A N/A BOMN                           Visit/Unit Tracking  AUTH Status:  Date 03/25 03/28 04/02 04/04 04/09 04/11 04/16 04/18 04/25  PN    BOMN Used 1 2 3 4 5 6 7 8 9 1 2 3 4    Remaining  9 8 7 6 5 4 3 2 1 9 8 7 6     AUTH Status:  Date 05/14 05/16 05/21 05/23            BOMN Used 5 6 7 1             Remaining  5 4 3 9                AUTH Status:  Date 6/18 06/20 06/24 06/25 6/27 07/01 07/02 07/08 7/15 7/16      BOMN Used 1 2 3 4 5 6 7 8 1 2       Remaining  9 8 7 6 5 4 3 2 9 8           Authorized (3/25/2024-12/31/2024)  Visits Requested Visits Authorized Visits Completed Visits Scheduled   99 [phone] 36 4         PRECAUTIONS: PD, Fall Risk, Slow processing, Dementia, [place]    Manuals 07/02 07/08 07/16 6/27 07/01                                   Neuro Re-Ed         Hurdles    Solo step   6inch   Fwd x 4 laps   Non reciprocally   Alt leading leg    Lat x 4 laps  In SOLO:  6 inch hurdles (5) fwd x5 laps, Vcs for inc clearance with trailing leg    6 inch hurdles (5) lat x3 laps, Vcs for technique and sequence Solo 1/4 track  6 inch hurdles (8) lat FWD: x4 laps,   Non reciprocally  Vcs for technique and sequence    Lat: x4 laps  Vcs for technique and sequence   HKM  Solo   With pool noodle and twist   1/4 track x 4 laps   Fwd and  back ea   In SOLO: 1/4 laps x6, Vcs for inc excursion    Dynamic balance  Solo  Dribbling ball fwd/back 1/4 track x 4 laps ea total throughout session       Bounce pass fwd/back walking   1/4 track x 2 laps each       Bounce and self catch with rotation to colored hex ladder with focus on trunk ROT  Until fatigue     Bounce pass in colored hex ladder on therapist command x 20     Self bounce pass to colored hex ladder on therapist command with turning until fatigue     Solo   Dribbling ball fwd/back 1/4 track x 6  laps ea total throughout session       Bounce pass fwd/back walking   1/4 track x 6 laps each    In SOLO: 6 inch block fwd step ups x15 ea side, lat x10 ea side, Vcs for sequence and technique Solo   6inch  Step up  W/SPC  Fwd  X 10 ea    Vcs for sequence and technique   Stairs         Boxing          Posture      Cues for upright posture with activities    STS   No UE as able   With noodle OH  And alt march   To sit down no UE    2x10 With UE x 10   W/o UE attempted but unable      Blazepods         Trunk rotation         Ther Ex        Bike    NuStep L6 x10 min for cardio endurance, mobility, ROM    Treadmill  Solo B/L UE 1.5 mph   3.0% incline   X 5 min x2 Solo  B/L UE for endurance 1.5 -1.3mph     X 5 min x2 Solo B/L UE for endurance 1.0mph     X10min   Solo B/L UE  1.5 mph  5min x 2                                                    Ther Activity        Bed mobility                 Gait Training        Treadmill         Level surface         Modalities

## 2024-07-16 NOTE — PROGRESS NOTES
"Daily Speech Treatment Note    Today's date: 2024   Patient’s name: Robi Bello  : 1940  MRN: [id number]  Safety measures: PD, Fall Risk  Referring provider: Joaquim Dickerson MD    Encounter Diagnosis     ICD-10-CM    1. Other symbolic dysfunctions  R48.8       2. Moderate dementia due to Parkinson's disease, unspecified whether behavioral, psychotic, or mood disturbance or anxiety ([facility])  G20.A1     F02.B0         Visit Tracking:  Re-eval Due POC Expires Auth Expiration Date ST Visit Limit   2024 BOMN   2024 BOMN                     Visit/Unit Tracking  AUTH Status:  Date 2024    BOMN Used 8 9 10 11 12 13     Remaining  BOMN BOMN BOMN BOMN BOMN BOMN        Subjective/Behavioral:  -Patient reports he is \"doing okay\"    Objective/Assessment:    Short Term Goals  1. Patient will complete word generation tasks (e.g., categorical naming, sentence/phrase completion, word deduction, definitions, etc.) at 75% accuracy with min semantic and phonemic cues from clinician as needed to target the maintenance of his/her expressive vocabulary, as well as promote socialization and safety. --ONGOING    Patient named concrete antonyms with 80% accuracy independently    Patient completed phrases given a word bank of 3 choices and completed the phrases with 85% accuracy.    2. Patient will complete cognitive-linguistic activities (e.g., verbal and visual problem solving scenarios, sequencing, reasoning, etc.) at 75% accuracy with min semantic cues from clinician as needed to target the maintenance of his/her cognitive-linguistic functioning, as well as promote safety and overall QOL. --ONGOING    Patient completed immediate memory activity. 1st trial: patient and SLP described picture for 1 minute and then picture was removed and patient answered questions pertaining to picture with 40% accuracy " independently; increasing to 88% accuracy given 2 choices. 2nd trial: patient wrote down information while looking at picture and then answered questions pertaining to picture with 80% accuracy independently      Plan:  -Patient was provided with home exercises/activities to target goals in plan of care at the end of today's session.  -Continue with current plan of care.

## 2024-07-22 ENCOUNTER — OFFICE VISIT (OUTPATIENT)
Dept: PHYSICAL THERAPY | Facility: CLINIC | Age: 84
End: 2024-07-22
Payer: COMMERCIAL

## 2024-07-22 DIAGNOSIS — R26.89 BALANCE DISORDER: Primary | ICD-10-CM

## 2024-07-22 DIAGNOSIS — G20.A1 PARKINSON'S DISEASE, UNSPECIFIED WHETHER DYSKINESIA PRESENT, UNSPECIFIED WHETHER MANIFESTATIONS FLUCTUATE: ICD-10-CM

## 2024-07-22 PROCEDURE — 97112 NEUROMUSCULAR REEDUCATION: CPT

## 2024-07-22 NOTE — PROGRESS NOTES
Daily Note     Today's date: 2024  Patient name: Robi Bello  : 1940  MRN: [id number]  Referring provider: Joaquim Dickerson MD  Dx:   Encounter Diagnosis     ICD-10-CM    1. Balance disorder  R26.89       2. Parkinson's disease, unspecified whether dyskinesia present, unspecified whether manifestations fluctuate  G20.A1                  Unbilled 3:38-3:45.     Subjective: No complaints offered.       Objective: See treatment diary below    Assessment: Tolerated treatment well. Difficulty following instructions today; often becoming confused. Challenged with foot clearance over hurdles. Patient demonstrated fatigue post treatment and would benefit from continued PT        Plan: continue with maintenance program      Plan of Care beginning date: 07/15/2024  Plan of Care expiration date: 2024  l  Visit count: 2 of 10; PN due 2024    POC expires Unit limit Auth Expiration date PT/OT/ST + Visit Limit?   2024 N/A N/A BOMN                           Visit/Unit Tracking  AUTH Status:  Date   PN    BOMN Used 1 2 3 4 5 6 7 8 9 1 2 3 4    Remaining  9 8 7 6 5 4 3 2 1 9 8 7 6     AUTH Status:  Date             BOMN Used 5 6 7 1             Remaining  5 4 3 9                AUTH Status:  Date 6/18 06/20 06/24 06/25 6/27 07/01 07/02 07/08 7/15 7/16 7/22     BOMN Used 1 2 3 4 5 6 7 8 1 2 1      Remaining  9 8 7 6 5 4 3 2 9 8 7         Authorized (3/25/2024-2024)  Visits Requested Visits Authorized Visits Completed Visits Scheduled   [phone]         PRECAUTIONS: PD, Fall Risk, Slow processing, Dementia, [place]    Manuals                                     Neuro Re-Ed          Hurdles    Solo step   6inch   Fwd x 4 laps   Non reciprocally   Alt leading leg    Lat x 4 laps  SOLO  6 inch   Fwd x4 laps    Lat x 4 laps    HKM  Solo   With pool noodle and twist    track  x 4 laps   Fwd and  back ea       Dynamic balance  Solo  Dribbling ball fwd/back 1/4 track x 4 laps ea total throughout session       Bounce pass fwd/back walking   1/4 track x 2 laps each       Bounce and self catch with rotation to colored hex ladder with focus on trunk ROT  Until fatigue     Bounce pass in colored hex ladder on therapist command x 20     Self bounce pass to colored hex ladder on therapist command with turning until fatigue     Solo   Dribbling ball fwd/back 1/4 track x 6  laps ea total throughout session       Bounce pass fwd/back walking   1/4 track x 6 laps each    SOLO  Dribbling ball fwd/back 1/4 track  X 6 laps       Bounce pass fwd/back walking 1/4 track x 6 laps ea.    Stairs         Boxing          Posture         STS   No UE as able   With noodle OH  And alt march   To sit down no UE    2x10 With UE x 10   W/o UE attempted but unable  With UE 2x10    Blazepods         Trunk rotation         Ther Ex    7/22    Bike        Treadmill  Solo B/L UE 1.5 mph   3.0% incline   X 5 min x2 Solo B/L UE for endurance 1.5 -1.3mph     X 5 min x2 Solo B/L UE for endurance 1.0mph     X10min  SOLO B/L UE for endurance 1.0mph  X5 mins x 2                                                     Ther Activity        Bed mobility                 Gait Training        Treadmill         Level surface         Modalities

## 2024-07-23 ENCOUNTER — OFFICE VISIT (OUTPATIENT)
Dept: SPEECH THERAPY | Facility: CLINIC | Age: 84
End: 2024-07-23
Payer: COMMERCIAL

## 2024-07-23 ENCOUNTER — OFFICE VISIT (OUTPATIENT)
Dept: PHYSICAL THERAPY | Facility: CLINIC | Age: 84
End: 2024-07-23
Payer: COMMERCIAL

## 2024-07-23 DIAGNOSIS — G20.A1 MODERATE DEMENTIA DUE TO PARKINSON'S DISEASE, UNSPECIFIED WHETHER BEHAVIORAL, PSYCHOTIC, OR MOOD DISTURBANCE OR ANXIETY (HCC): ICD-10-CM

## 2024-07-23 DIAGNOSIS — R26.89 BALANCE DISORDER: Primary | ICD-10-CM

## 2024-07-23 DIAGNOSIS — G20.A1 PARKINSON'S DISEASE, UNSPECIFIED WHETHER DYSKINESIA PRESENT, UNSPECIFIED WHETHER MANIFESTATIONS FLUCTUATE: ICD-10-CM

## 2024-07-23 DIAGNOSIS — R48.8 OTHER SYMBOLIC DYSFUNCTIONS: Primary | ICD-10-CM

## 2024-07-23 DIAGNOSIS — F02.B0 MODERATE DEMENTIA DUE TO PARKINSON'S DISEASE, UNSPECIFIED WHETHER BEHAVIORAL, PSYCHOTIC, OR MOOD DISTURBANCE OR ANXIETY (HCC): ICD-10-CM

## 2024-07-23 PROCEDURE — 92507 TX SP LANG VOICE COMM INDIV: CPT

## 2024-07-23 PROCEDURE — 97112 NEUROMUSCULAR REEDUCATION: CPT

## 2024-07-23 PROCEDURE — 97110 THERAPEUTIC EXERCISES: CPT

## 2024-07-23 NOTE — PROGRESS NOTES
Daily Note     Today's date: 2024  Patient name: Robi Bello  : 1940  MRN: [id number]  Referring provider: Joaquim Dickerson MD  Dx:   Encounter Diagnosis     ICD-10-CM    1. Balance disorder  R26.89       2. Parkinson's disease, unspecified whether dyskinesia present, unspecified whether manifestations fluctuate  G20.A1                      Subjective: Patient noted no new complaints.       Objective: See treatment diary below      Assessment: Tolerated treatment fair. Patient did well completing exercises today no LOB present. VC for passing ball with fwd/bwd ambulation instead of dribbling ball. Patient would benefit from continued PT      Plan: Continue per plan of care.      Plan of Care beginning date: 07/15/2024  Plan of Care expiration date: 2024  l  Visit count: 2 of 10; PN due 2024    POC expires Unit limit Auth Expiration date PT/OT/ST + Visit Limit?   2024 N/A N/A BOMN                           Visit/Unit Tracking  AUTH Status:  Date   PN    BOMN Used 1 2 3 4 5 6 7 8 9 1 2 3 4    Remaining  9 8 7 6 5 4 3 2 1 9 8 7 6     AUTH Status:  Date 05/14 05/16 05/21 05/23            BOMN Used 5 6 7 1             Remaining  5 4 3 9                AUTH Status:  Date 6/18 06/20 06/24 06/25 6/27 07/01 07/02 07/08 7/15 7/16 7/22 7/23    BOMN Used 1 2 3 4 5 6 7 8 1 2 3 4     Remaining  9 8 7 6 5 4 3 2 9 8 7 6        Authorized (3/25/2024-2024)  Visits Requested Visits Authorized Visits Completed Visits Scheduled   [phone]         PRECAUTIONS: PD, Fall Risk, Slow processing, Dementia, [place]    Manuals                                    Neuro Re-Ed          Hurdles    Solo step   6inch   Fwd x 4 laps   Non reciprocally   Alt leading leg    Lat x 4 laps  SOLO  6 inch   Fwd x4 laps    Lat x 4 laps SOLO  6 inch   Fwd x4 laps    Lat x 4 laps   HKM  Solo   With pool noodle and  twist   1/4 track x 4 laps   Fwd and  back ea       Dynamic balance  Solo  Dribbling ball fwd/back 1/4 track x 4 laps ea total throughout session       Bounce pass fwd/back walking   1/4 track x 2 laps each       Bounce and self catch with rotation to colored hex ladder with focus on trunk ROT  Until fatigue     Bounce pass in colored hex ladder on therapist command x 20     Self bounce pass to colored hex ladder on therapist command with turning until fatigue     Solo   Dribbling ball fwd/back 1/4 track x 6  laps ea total throughout session       Bounce pass fwd/back walking   1/4 track x 6 laps each    SOLO  Dribbling ball fwd/back 1/4 track  X 6 laps       Bounce pass fwd/back walking 1/4 track x 6 laps ea. SOLO  Dribbling ball fwd/back 1/4 track  X 6 laps       Bounce pass fwd/back walking 1/4 track x 6 laps ea.   Stairs         Boxing          Posture         STS   No UE as able   With noodle OH  And alt march   To sit down no UE    2x10 With UE x 10   W/o UE attempted but unable  With UE 2x10 With UE 2 x 10   Blazepods         Trunk rotation         Ther Ex    7/22    Bike        Treadmill  Solo B/L UE 1.5 mph   3.0% incline   X 5 min x2 Solo B/L UE for endurance 1.5 -1.3mph     X 5 min x2 Solo B/L UE for endurance 1.0mph     X10min  SOLO B/L UE for endurance 1.0mph  X5 mins x 2  SOLO B/L UE for endurance 1.0mph  X5 mins x 2                                                    Ther Activity        Bed mobility                 Gait Training        Treadmill         Level surface         Modalities

## 2024-07-23 NOTE — PROGRESS NOTES
Daily Speech Treatment Note    Today's date: 2024   Patient’s name: Robi Bello  : 1940  MRN: [id number]  Safety measures: PD, Fall Risk  Referring provider: Joaquim Dickesron MD    Encounter Diagnosis     ICD-10-CM    1. Other symbolic dysfunctions  R48.8       2. Moderate dementia due to Parkinson's disease, unspecified whether behavioral, psychotic, or mood disturbance or anxiety ([facility])  G20.A1     F02.B0         Visit Tracking:  Re-eval Due POC Expires Auth Expiration Date ST Visit Limit   2024 BOMN   2024 BOMN                     Visit/Unit Tracking  AUTH Status:  Date 2024   BOMN Used 8 9 10 11 12 13 14    Remaining  BOMN BOMN BOMN BOMN BOMN BOMN BOMN       Subjective/Behavioral:  -Patient appeared to be in good spirits during session today.     Objective/Assessment:    Short Term Goals  1. Patient will complete word generation tasks (e.g., categorical naming, sentence/phrase completion, word deduction, definitions, etc.) at 75% accuracy with min semantic and phonemic cues from clinician as needed to target the maintenance of his/her expressive vocabulary, as well as promote socialization and safety. --ONGOING    To target expressive language: Patient was presented with a category (e.g. state capitals) and was instructed to name two answers.  Patient completed this task over 15 categories and gave answers in 24/30 (80%) opportunities independently, increasing to 30/30 (100%) opportunities given min semantic cues.    2. Patient will complete cognitive-linguistic activities (e.g., verbal and visual problem solving scenarios, sequencing, reasoning, etc.) at 75% accuracy with min semantic cues from clinician as needed to target the maintenance of his/her cognitive-linguistic functioning, as well as promote safety and overall QOL. --ONGOING    To target auditory attention:  Patient was presented with complex directions (e.g. Draw two large circles in the middle of the paper, divide the right [place] in half) and was instructed to complete.  He completed this task over 7/14 (50%) opportunities independently, increasing to 14/14 (100%) opportunities given multiple repetitions.     To target written directions: Patient was presented with a list of 20 words and was instructed to read a prompt and do what it says (E.g. cross out all the numbers, put a star next to anything found in a school).  He completed this task over 5 directions in 10/20 (50%) opportunities independently, increasing to 20/20 (100%) opportunities given mod to max verbal cues.     Plan:  -Patient was provided with home exercises/activities to target goals in plan of care at the end of today's session.  -Continue with current plan of care.

## 2024-07-29 ENCOUNTER — OFFICE VISIT (OUTPATIENT)
Dept: PHYSICAL THERAPY | Facility: CLINIC | Age: 84
End: 2024-07-29
Payer: COMMERCIAL

## 2024-07-29 DIAGNOSIS — R26.89 BALANCE DISORDER: Primary | ICD-10-CM

## 2024-07-29 DIAGNOSIS — G20.A1 PARKINSON'S DISEASE, UNSPECIFIED WHETHER DYSKINESIA PRESENT, UNSPECIFIED WHETHER MANIFESTATIONS FLUCTUATE: ICD-10-CM

## 2024-07-29 PROCEDURE — 97112 NEUROMUSCULAR REEDUCATION: CPT | Performed by: PHYSICAL THERAPIST

## 2024-07-29 PROCEDURE — 97110 THERAPEUTIC EXERCISES: CPT | Performed by: PHYSICAL THERAPIST

## 2024-07-29 NOTE — PROGRESS NOTES
Daily Note     Today's date: 2024  Patient name: Robi Bello  : 1940  MRN: [id number]  Referring provider: Joaquim Dickerson MD  Dx:   Encounter Diagnosis     ICD-10-CM    1. Balance disorder  R26.89       2. Parkinson's disease, unspecified whether dyskinesia present, unspecified whether manifestations fluctuate  G20.A1           Start Time: 1500  Stop Time: 1545  Total time in clinic (min): 45 minutes    Subjective: feels he is not doing well today, no know reason.       Objective: See treatment diary below  1:1 w/PT 3:00-3:30p -30 min     Assessment: continued  Tolerated treatment well. Patient demonstrated fatigue post treatment and would benefit from continued PTconfused with sequencing for step ups requires cues for safety and proper performance.       Plan: continue with maintenance program     Plan of Care beginning date: 07/15/2024  Plan of Care expiration date: 2024  l  Visit count: 5 of 10; PN due 2024    POC expires Unit limit Auth Expiration date PT/OT/ST + Visit Limit?   2024 N/A N/A BOMN                           Visit/Unit Tracking  AUTH Status:  Date   PN    BOMN Used 1 2 3 4 5 6 7 8 9 1 2 3 4    Remaining  9 8 7 6 5 4 3 2 1 9 8 7 6     AUTH Status:  Date             BOMN Used 5 6 7 1             Remaining  5 4 3 9                AUTH Status:  Date 6/18 06/20 06/24 06/25 6/27 07/01 07/02 07/08 7/15 7/16 7/22 7/23 7/29   BOMN Used 1 2 3 4 5 6 7 8 1 2 3 4 5    Remaining  9 8 7 6 5 4 3 2 9 8 7 6 5       Authorized (3/25/2024-2024)  Visits Requested Visits Authorized Visits Completed Visits Scheduled   [phone]         PRECAUTIONS: PD, Fall Risk, Slow processing, Dementia, [place]    Manuals                                    Neuro Re-Ed          Hurdles  15ft // b ars UE PRN  Low (x7)  Fwd x 4 laps   Lat x 4 laps   Solo step   6inch    Fwd x 4 laps   Non reciprocally   Alt leading leg    Lat x 4 laps  SOLO  6 inch   Fwd x4 laps    Lat x 4 laps SOLO  6 inch   Fwd x4 laps    Lat x 4 laps   HKM  Solo   With pool noodle and twist   1/4 track x 4 laps   Fwd and  back ea       Dynamic balance      Solo   Dribbling ball fwd/back 1/4 track x 6  laps ea total throughout session       Bounce pass fwd/back walking   1/4 track x 6 laps each    SOLO  Dribbling ball fwd/back 1/4 track  X 6 laps       Bounce pass fwd/back walking 1/4 track x 6 laps ea. SOLO  Dribbling ball fwd/back 1/4 track  X 6 laps       Bounce pass fwd/back walking 1/4 track x 6 laps ea.   Stairs  6in B/L UE  Fwd 2x10  Lat 2x10       Boxing          Posture         STS  W/UE 2x10  No UE as able   With noodle OH  And alt march   To sit down no UE    2x10 With UE x 10   W/o UE attempted but unable  With UE 2x10 With UE 2 x 10   Blazepods         Trunk rotation         Ther Ex    7/22    Bike Nu step   L6   X 12 min   Hr 65  O2 96%        Treadmill   Solo B/L UE for endurance 1.5 -1.3mph     X 5 min x2 Solo B/L UE for endurance 1.0mph     X10min  SOLO B/L UE for endurance 1.0mph  X5 mins x 2  SOLO B/L UE for endurance 1.0mph  X5 mins x 2                                                    Ther Activity        Bed mobility                 Gait Training        Treadmill         Level surface         Modalities

## 2024-07-30 ENCOUNTER — OFFICE VISIT (OUTPATIENT)
Dept: PHYSICAL THERAPY | Facility: CLINIC | Age: 84
End: 2024-07-30
Payer: COMMERCIAL

## 2024-07-30 ENCOUNTER — OFFICE VISIT (OUTPATIENT)
Dept: SPEECH THERAPY | Facility: CLINIC | Age: 84
End: 2024-07-30
Payer: COMMERCIAL

## 2024-07-30 DIAGNOSIS — F02.B0 MODERATE DEMENTIA DUE TO PARKINSON'S DISEASE, UNSPECIFIED WHETHER BEHAVIORAL, PSYCHOTIC, OR MOOD DISTURBANCE OR ANXIETY (HCC): ICD-10-CM

## 2024-07-30 DIAGNOSIS — R26.89 BALANCE DISORDER: Primary | ICD-10-CM

## 2024-07-30 DIAGNOSIS — G20.A1 PARKINSON'S DISEASE, UNSPECIFIED WHETHER DYSKINESIA PRESENT, UNSPECIFIED WHETHER MANIFESTATIONS FLUCTUATE: ICD-10-CM

## 2024-07-30 DIAGNOSIS — G20.A1 MODERATE DEMENTIA DUE TO PARKINSON'S DISEASE, UNSPECIFIED WHETHER BEHAVIORAL, PSYCHOTIC, OR MOOD DISTURBANCE OR ANXIETY (HCC): ICD-10-CM

## 2024-07-30 DIAGNOSIS — R48.8 OTHER SYMBOLIC DYSFUNCTIONS: Primary | ICD-10-CM

## 2024-07-30 PROCEDURE — 97112 NEUROMUSCULAR REEDUCATION: CPT | Performed by: PHYSICAL THERAPIST

## 2024-07-30 PROCEDURE — 97110 THERAPEUTIC EXERCISES: CPT | Performed by: PHYSICAL THERAPIST

## 2024-07-30 PROCEDURE — 92507 TX SP LANG VOICE COMM INDIV: CPT

## 2024-07-30 NOTE — PROGRESS NOTES
"Daily Speech Treatment Note    Today's date: 2024   Patient’s name: Robi Bello  : 1940  MRN: [id number]  Safety measures: PD, Fall Risk  Referring provider: Joaqium Dickerson MD    Encounter Diagnosis     ICD-10-CM    1. Other symbolic dysfunctions  R48.8       2. Moderate dementia due to Parkinson's disease, unspecified whether behavioral, psychotic, or mood disturbance or anxiety ([facility])  G20.A1     F02.B0         Visit Tracking:  Re-eval Due POC Expires Auth Expiration Date ST Visit Limit   2024 BOMN   2024 BOMN                     Visit/Unit Tracking  AUTH Status:  Date 2024         BOMN Used 15          Remaining  BOMN             Subjective/Behavioral:  -Patient stated that he was having a rough day today.     Objective/Assessment:    Short Term Goals  1. Patient will complete word generation tasks (e.g., categorical naming, sentence/phrase completion, word deduction, definitions, etc.) at 75% accuracy with min semantic and phonemic cues from clinician as needed to target the maintenance of his/her expressive vocabulary, as well as promote socialization and safety. --ONGOING    To target education and practice of the circumlocution strategy, patient was asked to provide verbal/semantic descriptions of different people/places/things using \"[place]\" cards.  Patient guessed cards in 1/3 (33%) opportunities independently, increasing to 3/3 (100%) opportunities given mod to max verbal cues.  Patient described cards in 3/3 (100%) opportunities given mod to max verbal cues.    To target thought organization: Patient was verbally presented with a word and was instructed to rearrange the letters within the word to create a new word (I.e. Anagrams). Patient completed this task in 6/10 (60%) opportunities independently, increasing to 10/10 (100%) opportunities given the first letter.  It should be noted that Patient did use letter tiles " throughout task.     2. Patient will complete cognitive-linguistic activities (e.g., verbal and visual problem solving scenarios, sequencing, reasoning, etc.) at 75% accuracy with min semantic cues from clinician as needed to target the maintenance of his/her cognitive-linguistic functioning, as well as promote safety and overall QOL. --ONGOING      Plan:  -Patient was provided with home exercises/activities to target goals in plan of care at the end of today's session.  -Continue with current plan of care.

## 2024-07-30 NOTE — PROGRESS NOTES
Daily Note     Today's date: 2024  Patient name: Robi Bello  : 1940  MRN: [id number]  Referring provider: Joaquim Dickerson MD  Dx:   Encounter Diagnosis     ICD-10-CM    1. Balance disorder  R26.89       2. Parkinson's disease, unspecified whether dyskinesia present, unspecified whether manifestations fluctuate  G20.A1           Start Time: 1430  Stop Time: 1515  Total time in clinic (min): 45 minutes    Subjective: notes he is tired today and shuffling more, woke up at 4:30am.       Objective: See treatment diary below      Assessment: Tolerated treatment well. Patient demonstrated fatigue post treatment and would benefit from continued PT  Improved obstacle challenge with repeated challenge and cue for motor planning    Plan: Continue per plan of care.      Plan of Care beginning date: 07/15/2024  Plan of Care expiration date: 2024  l  Visit count: 2 of 10; PN due 2024    POC expires Unit limit Auth Expiration date PT/OT/ST + Visit Limit?   2024 N/A N/A BOMN                           Visit/Unit Tracking  AUTH Status:  Date   PN    BOMN Used 1 2 3 4 5 6 7 8 9 1 2 3 4    Remaining  9 8 7 6 5 4 3 2 1 9 8 7 6     AUTH Status:  Date             BOMN Used 5 6 7 1             Remaining  5 4 3 9                AUTH Status:  Date 6/18 06/20 06/24 06/25 6/27 07/01 07/02 07/08 7/15 7/16 7/22 7/23 7/29   BOMN Used 1 2 3 4 5 6 7 8 1 2 3 4 5    Remaining  9 8 7 6 5 4 3 2 9 8 7 6 5     AUTH Status:  Date 6/18 06/20 06/24 06/25 6/27 07/01 07/02 07/08 7/15 7/16 7/22 7/23 7/29   BOMN Used 1 2 3 4 5 6 7 8 1 2 3 4 5    Remaining  9 8 7 6 5 4 3 2 9 8 7 6 5       Authorized (3/25/2024-2024)  Visits Requested Visits Authorized Visits Completed Visits Scheduled   [phone]         PRECAUTIONS: PD, Fall Risk, Slow processing, Dementia, [place]    Manuals                                     Neuro Re-Ed     7/22     Hurdles  SOLO  6 inch   With uneven surface  Fwd x4 laps    Lat x 4 lap  Solo step   6inch   Fwd x 4 laps   Non reciprocally   Alt leading leg    Lat x 4 laps  SOLO  6 inch   Fwd x4 laps    Lat x 4 laps SOLO  6 inch   Fwd x4 laps    Lat x 4 laps   HKM        Dynamic balance  Obstacles with blaze pods   Navigating around and over   Tapping blaze pods at different heights x 5 attempts   Solo   Dribbling ball fwd/back 1/4 track x 6  laps ea total throughout session       Bounce pass fwd/back walking   1/4 track x 6 laps each    SOLO  Dribbling ball fwd/back 1/4 track  X 6 laps       Bounce pass fwd/back walking 1/4 track x 6 laps ea. SOLO  Dribbling ball fwd/back 1/4 track  X 6 laps       Bounce pass fwd/back walking 1/4 track x 6 laps ea.   Stairs         Boxing          Posture         STS    With UE x 10   W/o UE attempted but unable  With UE 2x10 With UE 2 x 10   Blazepods         Trunk rotation         Ther Ex    7/22    Bike        Treadmill  SOLO B/L UE for endurance 1.0mph  X5 mins x 2   Solo B/L UE for endurance 1.0mph     X10min  SOLO B/L UE for endurance 1.0mph  X5 mins x 2  SOLO B/L UE for endurance 1.0mph  X5 mins x 2                                                    Ther Activity        Bed mobility                 Gait Training        Treadmill         Level surface         Modalities

## 2024-08-05 ENCOUNTER — OFFICE VISIT (OUTPATIENT)
Dept: SPEECH THERAPY | Facility: CLINIC | Age: 84
End: 2024-08-05
Payer: COMMERCIAL

## 2024-08-05 ENCOUNTER — OFFICE VISIT (OUTPATIENT)
Dept: PHYSICAL THERAPY | Facility: CLINIC | Age: 84
End: 2024-08-05
Payer: COMMERCIAL

## 2024-08-05 DIAGNOSIS — F02.B0 MODERATE DEMENTIA DUE TO PARKINSON'S DISEASE, UNSPECIFIED WHETHER BEHAVIORAL, PSYCHOTIC, OR MOOD DISTURBANCE OR ANXIETY (HCC): ICD-10-CM

## 2024-08-05 DIAGNOSIS — R26.89 BALANCE DISORDER: Primary | ICD-10-CM

## 2024-08-05 DIAGNOSIS — G20.A1 MODERATE DEMENTIA DUE TO PARKINSON'S DISEASE, UNSPECIFIED WHETHER BEHAVIORAL, PSYCHOTIC, OR MOOD DISTURBANCE OR ANXIETY (HCC): ICD-10-CM

## 2024-08-05 DIAGNOSIS — R48.8 OTHER SYMBOLIC DYSFUNCTIONS: Primary | ICD-10-CM

## 2024-08-05 DIAGNOSIS — G20.A1 PARKINSON'S DISEASE, UNSPECIFIED WHETHER DYSKINESIA PRESENT, UNSPECIFIED WHETHER MANIFESTATIONS FLUCTUATE: ICD-10-CM

## 2024-08-05 PROCEDURE — 97112 NEUROMUSCULAR REEDUCATION: CPT | Performed by: PHYSICAL THERAPIST

## 2024-08-05 PROCEDURE — 92507 TX SP LANG VOICE COMM INDIV: CPT

## 2024-08-05 PROCEDURE — 97110 THERAPEUTIC EXERCISES: CPT | Performed by: PHYSICAL THERAPIST

## 2024-08-05 NOTE — PROGRESS NOTES
Daily Note     Today's date: 2024  Patient name: Robi Bello  : 1940  MRN: [id number]  Referring provider: Joaquim Dickerson MD  Dx:   Encounter Diagnosis     ICD-10-CM    1. Balance disorder  R26.89       2. Parkinson's disease, unspecified whether dyskinesia present, unspecified whether manifestations fluctuate  G20.A1           Start Time: 1530  Stop Time: 1615  Total time in clinic (min): 45 minutes    Subjective: not feeling good today doesn't feel right in the head.       Objective: See treatment diary below      Assessment: Tolerated treatment well. Patient demonstrated fatigue post treatment and would benefit from continued PT  Challenged with multi step directions an difficulty with sequencing requires cues to obtain big steps, improved with repeated practice with use of external cues. Demonstrates improved trunk ROM and mobility with repeated practice and repeated functional movement.     Plan: Continue per plan of care.      Plan of Care beginning date: 07/15/2024  Plan of Care expiration date: 2024  l  Visit count: 2 of 10; PN due 2024    POC expires Unit limit Auth Expiration date PT/OT/ST + Visit Limit?   2024 N/A N/A BOMN                           Visit/Unit Tracking  AUTH Status:  Date 03/25 03/28 04/02 04/04 04/09 04/11 04/16 04/18 04/25  PN    BOMN Used 1 2 3 4 5 6 7 8 9 1 2 3 4    Remaining  9 8 7 6 5 4 3 2 1 9 8 7 6     AUTH Status:  Date 05/14 05/16 05/21 05/23            BOMN Used 5 6 7 1             Remaining  5 4 3 9                AUTH Status:  Date 6/18 06/20 06/24 06/25 6/27 07/01 07/02 07/08 7/15 7/16 7/22 7/23 7/29   BOMN Used 1 2 3 4 5 6 7 8 1 2 3 4 5    Remaining  9 8 7 6 5 4 3 2 9 8 7 6 5     AUTH Status:  Date               BOMN Used 6 7               Remaining  4 3                    PRECAUTIONS: PD, Fall Risk, Slow processing, Dementia, [place]    Manuals                                     Neuro Re-Ed     7/22     Hurdles  SOLO  6 inch   With uneven surface  Fwd x4 laps    Lat x 4 lap  Solo step   6inch   Fwd x 4 laps   Non reciprocally   Alt leading leg    Lat x 4 laps  SOLO  6 inch   Fwd x4 laps    Lat x 4 laps SOLO  6 inch   Fwd x4 laps    Lat x 4 laps   HKM        Dynamic balance  Obstacles with blaze pods   Navigating around and over   Tapping blaze pods at different heights x 5 attempts  Solo   1/4 track    Lateral walking x 6 laps    Stepping over small cones   Fwd x 4 laps  Adding in tap challenged with multiple attempts    Requires instructions and CS to complete   Solo   Dribbling ball fwd/back 1/4 track x 6  laps ea total throughout session       Bounce pass fwd/back walking   1/4 track x 6 laps each    SOLO  Dribbling ball fwd/back 1/4 track  X 6 laps       Bounce pass fwd/back walking 1/4 track x 6 laps ea. SOLO  Dribbling ball fwd/back 1/4 track  X 6 laps       Bounce pass fwd/back walking 1/4 track x 6 laps ea.   Stairs         Boxing          Posture         STS    With UE x 10   W/o UE attempted but unable  With UE 2x10 With UE 2 x 10   Blazepods         Trunk rotation   Standing with use of clips   Red and yellow  Cross body rotation with varying heights  X 20 ea direction       Ther Ex    7/22    Bike        Treadmill  SOLO B/L UE for endurance 1.0mph  X5 mins x 2  SOLO B/L UE for endurance 1.2mph  X 10 min  Solo B/L UE for endurance 1.0mph     X10min  SOLO B/L UE for endurance 1.0mph  X5 mins x 2  SOLO B/L UE for endurance 1.0mph  X5 mins x 2                                                    Ther Activity        Bed mobility                 Gait Training        Treadmill         Level surface         Modalities                                                                       No

## 2024-08-05 NOTE — PROGRESS NOTES
"Daily Speech Treatment Note    Today's date: 2024   Patient’s name: Robi Bello  : 1940  MRN: [id number]  Safety measures: PD, Fall Risk  Referring provider: Joaquim Dickerson MD    Encounter Diagnosis     ICD-10-CM    1. Other symbolic dysfunctions  R48.8       2. Moderate dementia due to Parkinson's disease, unspecified whether behavioral, psychotic, or mood disturbance or anxiety ([facility])  G20.A1     F02.B0         Visit Tracking:  Re-eval Due POC Expires Auth Expiration Date ST Visit Limit   2024 BOMN   2024 BOMN                     Visit/Unit Tracking  AUTH Status:  Date 2024        BOMN Used 15 16         Remaining  BOMN BOMN            Subjective/Behavioral:  -Patient stated that he has been running around all morning.     Objective/Assessment:    Short Term Goals  1. Patient will complete word generation tasks (e.g., categorical naming, sentence/phrase completion, word deduction, definitions, etc.) at 75% accuracy with min semantic and phonemic cues from clinician as needed to target the maintenance of his/her expressive vocabulary, as well as promote socialization and safety. --ONGOING    To target word finding: Patient played \"[place]apple\" where they were given a category and an array of 20 letters and was instructed to state words that fit both the given category and letter combination.  Patient completed this task over 3 trials:  -Boy Names: 10/10 (100%) ind  -Jobs/Occupations: 8/10 (80%) ind; 10/10 (100%) semantic cues  -[place]: 7/10 (70%) ind; 10/10 (100%) semantic cues    2. Patient will complete cognitive-linguistic activities (e.g., verbal and visual problem solving scenarios, sequencing, reasoning, etc.) at 75% accuracy with min semantic cues from clinician as needed to target the maintenance of his/her cognitive-linguistic functioning, as well as promote safety and overall QOL. --ONGOING      Plan:  -Patient was " provided with home exercises/activities to target goals in plan of care at the end of today's session.  -Continue with current plan of care.

## 2024-08-08 ENCOUNTER — OFFICE VISIT (OUTPATIENT)
Dept: PHYSICAL THERAPY | Facility: CLINIC | Age: 84
End: 2024-08-08
Payer: COMMERCIAL

## 2024-08-08 DIAGNOSIS — R26.89 BALANCE DISORDER: Primary | ICD-10-CM

## 2024-08-08 DIAGNOSIS — G20.A1 PARKINSON'S DISEASE, UNSPECIFIED WHETHER DYSKINESIA PRESENT, UNSPECIFIED WHETHER MANIFESTATIONS FLUCTUATE: ICD-10-CM

## 2024-08-08 PROCEDURE — 97110 THERAPEUTIC EXERCISES: CPT | Performed by: PHYSICAL THERAPIST

## 2024-08-08 PROCEDURE — 97112 NEUROMUSCULAR REEDUCATION: CPT | Performed by: PHYSICAL THERAPIST

## 2024-08-08 NOTE — PROGRESS NOTES
Daily Note     Today's date: 2024  Patient name: Robi Bello  : 1940  MRN: [id number]  Referring provider: Joaquim Dickerson MD  Dx:   Encounter Diagnosis     ICD-10-CM    1. Balance disorder  R26.89       2. Parkinson's disease, unspecified whether dyskinesia present, unspecified whether manifestations fluctuate  G20.A1           Start Time: 1405  Stop Time: 1445  Total time in clinic (min): 40 minutes    Subjective: just feels like his brain is full, cloudy and foggy. Per daughter patient and his wife moved this week so it has been a bit of a rough week.       Objective: See treatment diary below      Assessment: Tolerated treatment well. Patient demonstrated fatigue post treatment and would benefit from continued PT  Slow to progress due to slow processing and difficulty with coordinating upper and LE as well as sequencing for hand eye coordination. Challenged appropriately with dynamic tasks and demonstrates effective stepping strategies for balance control. Requires cues for safety with turning and to avoid shuffling gait pattern along with cues to attend to tasks.     Plan: Continue per plan of care. Continue with maintenance program      Plan of Care beginning date: 07/15/2024  Plan of Care expiration date: 2024  l  Visit count: 2 of 10; PN due 2024    POC expires Unit limit Auth Expiration date PT/OT/ST + Visit Limit?   2024 N/A N/A BOMN                           Visit/Unit Tracking  AUTH Status:  Date 03/25 03/28 04/02 04/04 04/09 04/11 04/16 04/18 04/25  PN    BOMN Used 1 2 3 4 5 6 7 8 9 1 2 3 4    Remaining  9 8 7 6 5 4 3 2 1 9 8 7 6     AUTH Status:  Date 05/14 05/16 05/21 05/23            BOMN Used 5 6 7 1             Remaining  5 4 3 9                AUTH Status:  Date 6/18 06/20 06/24 06/25 6/27 07/01 07/02 07/08 7/15 7/16 7/22 7/23 7/29   BOMN Used 1 2 3 4 5 6 7 8 1 2 3 4 5    Remaining  9 8 7 6 5 4 3 2 9 8 7 6 5     AUTH Status:  Date   08/05 08/08             BOMN Used 6 7 8              Remaining  4 3 2                   PRECAUTIONS: PD, Fall Risk, Slow processing, Dementia, [place]    Manuals 7/30 08/05 08/08 7/22 7/23                                   Neuro Re-Ed     7/22     Hurdles  SOLO  6 inch   With uneven surface  Fwd x4 laps    Lat x 4 lap   SOLO  6 inch   Fwd x4 laps    Lat x 4 laps SOLO  6 inch   Fwd x4 laps    Lat x 4 laps   HKM        Dynamic balance  Obstacles with blaze pods   Navigating around and over   Tapping blaze pods at different heights x 5 attempts  Solo   1/4 track    Lateral walking x 6 laps    Stepping over small cones   Fwd x 4 laps  Adding in tap challenged with multiple attempts    Requires instructions and CS to complete   Solo   Dribbling ball fwd/back 1/4 track x 6  laps ea total t      Bounce pass fwd/back walking   1/4 track x 6 laps each       Self ball pass from cone to cone   Walking fwd, multiple attempts and demonstrations    Ball pass from cone to cone with PT  U/l hand fwd/bck walking 1/4 track x 3 laps each  SOLO  Dribbling ball fwd/back 1/4 track  X 6 laps       Bounce pass fwd/back walking 1/4 track x 6 laps ea. SOLO  Dribbling ball fwd/back 1/4 track  X 6 laps       Bounce pass fwd/back walking 1/4 track x 6 laps ea.   Stairs         Boxing          Posture         STS    With UE x 10   W/o UE attempted but unable  With UE 2x10 With UE 2 x 10   Blazepods         Trunk rotation   Standing with use of clips   Red and yellow  Cross body rotation with varying heights  X 20 ea direction       Ther Ex    7/22    Bike        Treadmill  SOLO B/L UE for endurance 1.0mph  X5 mins x 2  SOLO B/L UE for endurance 1.2mph  X 10 min  Solo B/L UE for endurance 1.3mph     X10min  SOLO B/L UE for endurance 1.0mph  X5 mins x 2  SOLO B/L UE for endurance 1.0mph  X5 mins x 2                                                    Ther Activity        Bed mobility                 Gait Training        Treadmill         Level surface          Modalities

## 2024-08-12 ENCOUNTER — OFFICE VISIT (OUTPATIENT)
Dept: SPEECH THERAPY | Facility: CLINIC | Age: 84
End: 2024-08-12
Payer: COMMERCIAL

## 2024-08-12 ENCOUNTER — APPOINTMENT (OUTPATIENT)
Dept: PHYSICAL THERAPY | Facility: CLINIC | Age: 84
End: 2024-08-12
Payer: COMMERCIAL

## 2024-08-12 ENCOUNTER — ESTABLISHED COMPREHENSIVE EXAM (OUTPATIENT)
Dept: URBAN - METROPOLITAN AREA CLINIC 6 | Facility: CLINIC | Age: 84
End: 2024-08-12

## 2024-08-12 DIAGNOSIS — F02.B0 MODERATE DEMENTIA DUE TO PARKINSON'S DISEASE, UNSPECIFIED WHETHER BEHAVIORAL, PSYCHOTIC, OR MOOD DISTURBANCE OR ANXIETY (HCC): ICD-10-CM

## 2024-08-12 DIAGNOSIS — Z96.1: ICD-10-CM

## 2024-08-12 DIAGNOSIS — H04.123: ICD-10-CM

## 2024-08-12 DIAGNOSIS — H02.885: ICD-10-CM

## 2024-08-12 DIAGNOSIS — R48.8 OTHER SYMBOLIC DYSFUNCTIONS: Primary | ICD-10-CM

## 2024-08-12 DIAGNOSIS — H02.882: ICD-10-CM

## 2024-08-12 DIAGNOSIS — G20.A1 MODERATE DEMENTIA DUE TO PARKINSON'S DISEASE, UNSPECIFIED WHETHER BEHAVIORAL, PSYCHOTIC, OR MOOD DISTURBANCE OR ANXIETY (HCC): ICD-10-CM

## 2024-08-12 DIAGNOSIS — H02.052: ICD-10-CM

## 2024-08-12 PROCEDURE — 92014 COMPRE OPH EXAM EST PT 1/>: CPT

## 2024-08-12 PROCEDURE — 92015 DETERMINE REFRACTIVE STATE: CPT

## 2024-08-12 PROCEDURE — 92507 TX SP LANG VOICE COMM INDIV: CPT

## 2024-08-12 ASSESSMENT — KERATOMETRY
OS_K1POWER_DIOPTERS: 44.25
OD_AXISANGLE2_DEGREES: 161
OS_AXISANGLE_DEGREES: 85
OD_K2POWER_DIOPTERS: 45.00
OS_AXISANGLE2_DEGREES: 175
OD_K1POWER_DIOPTERS: 44.00
OS_K2POWER_DIOPTERS: 45.50
OD_AXISANGLE_DEGREES: 71

## 2024-08-12 ASSESSMENT — VISUAL ACUITY
OS_CC: 20/40-1
OD_CC: 20/40-2
OU_CC: J1+

## 2024-08-12 ASSESSMENT — TONOMETRY
OD_IOP_MMHG: 10
OS_IOP_MMHG: 9

## 2024-08-12 NOTE — PROGRESS NOTES
Daily Speech Treatment Note    Today's date: 2024   Patient’s name: Robi Bello  : 1940  MRN: [id number]  Safety measures: PD, Fall Risk  Referring provider: Joaquim Dickerson MD    Encounter Diagnosis     ICD-10-CM    1. Other symbolic dysfunctions  R48.8       2. Moderate dementia due to Parkinson's disease, unspecified whether behavioral, psychotic, or mood disturbance or anxiety ([facility])  G20.A1     F02.B0         Visit Tracking:  Re-eval Due POC Expires Auth Expiration Date ST Visit Limit   2024 BOMN   2024 BOMN                     Visit/Unit Tracking  AUTH Status:  Date 2024       BOMN Used 15 16 17        Remaining  BOMN BOMN BOMN           Subjective/Behavioral:  -Patient was in good spirits during today's session.     Objective/Assessment:  -No homework was returned on this date of service.    Short Term Goals  1. Patient will complete word generation tasks (e.g., categorical naming, sentence/phrase completion, word deduction, definitions, etc.) at 75% accuracy with min semantic and phonemic cues from clinician as needed to target the maintenance of his/her expressive vocabulary, as well as promote socialization and safety. --ONGOING    2. Patient will complete cognitive-linguistic activities (e.g., verbal and visual problem solving scenarios, sequencing, reasoning, etc.) at 75% accuracy with min semantic cues from clinician as needed to target the maintenance of his/her cognitive-linguistic functioning, as well as promote safety and overall QOL. --ONGOING    To target auditory attention: Patient presented with 5 word tiles and was instructed to re-order the words into a grammatically correct sentence (the, open, door, back = open the back door.  Patient completed this task in 11/15 (73%) opportunities independently, increasing to 15/15 (100%) opportunities given one repetition.      Plan:  -Patient was  provided with home exercises/activities to target goals in plan of care at the end of today's session.  -Continue with current plan of care.

## 2024-08-13 ENCOUNTER — OFFICE VISIT (OUTPATIENT)
Dept: PHYSICAL THERAPY | Facility: CLINIC | Age: 84
End: 2024-08-13
Payer: COMMERCIAL

## 2024-08-13 DIAGNOSIS — G20.A1 PARKINSON'S DISEASE, UNSPECIFIED WHETHER DYSKINESIA PRESENT, UNSPECIFIED WHETHER MANIFESTATIONS FLUCTUATE: ICD-10-CM

## 2024-08-13 DIAGNOSIS — R26.89 BALANCE DISORDER: Primary | ICD-10-CM

## 2024-08-13 PROCEDURE — 97112 NEUROMUSCULAR REEDUCATION: CPT | Performed by: PHYSICAL THERAPIST

## 2024-08-13 PROCEDURE — 97110 THERAPEUTIC EXERCISES: CPT | Performed by: PHYSICAL THERAPIST

## 2024-08-13 NOTE — PROGRESS NOTES
Daily Note     Today's date: 2024  Patient name: Robi Bello  : 1940  MRN: [id number]  Referring provider: Joaquim Dickerson MD  Dx:   Encounter Diagnosis     ICD-10-CM    1. Balance disorder  R26.89       2. Parkinson's disease, unspecified whether dyskinesia present, unspecified whether manifestations fluctuate  G20.A1                      Subjective: doing well patient wife notes he has to walk more now in the apartment complex (get to the elevator and then the cafeteria) and he is doing well with it      Objective: See treatment diary below      Assessment: Tolerated treatment well. Patient demonstrated fatigue post treatment and would benefit from continued PT. Challenged with multi step commands requires cues and demonstration. Does well with external cues for arm swings.       Plan: Continue per plan of care.      Plan of Care beginning date: 07/15/2024  Plan of Care expiration date: 2024  l  Visit count: 2 of 10; PN due 2024    POC expires Unit limit Auth Expiration date PT/OT/ST + Visit Limit?   2024 N/A N/A BOMN                           Visit/Unit Tracking  AUTH Status:  Date 03/25 03/28 04/02 04/04 04/09 04/11 04/16 04/18 04/25  PN    BOMN Used 1 2 3 4 5 6 7 8 9 1 2 3 4    Remaining  9 8 7 6 5 4 3 2 1 9 8 7 6     AUTH Status:  Date 05/14 05/16 05/21 05/23            BOMN Used 5 6 7 1             Remaining  5 4 3 9                AUTH Status:  Date 6/18 06/20 06/24 06/25 6/27 07/01 07/02 07/08 7/15 7/16 7/22 7/23 7/29   BOMN Used 1 2 3 4 5 6 7 8 1 2 3 4 5    Remaining  9 8 7 6 5 4 3 2 9 8 7 6 5     AUTH Status:  Date 7/30 08/05 08/08 08/13            BOMN Used 6 7 8 9             Remaining  4 3 2 1                  PRECAUTIONS: PD, Fall Risk, Slow processing, Dementia, [place]    Manuals                                    Neuro Re-Ed         Hurdles  SOLO  6 inch   With uneven surface  Fwd x4 laps    Lat x 4 lap    SOLO  6  inch   Fwd x4 laps    Lat x 4 laps   HKM    Solo   1/4 track x 4 laps     With 5# DB   Arm swing x 4 laps     OH yellow tbar   With marching x 30     Dynamic balance  Obstacles with blaze pods   Navigating around and over   Tapping blaze pods at different heights x 5 attempts  Solo   1/4 track    Lateral walking x 6 laps    Stepping over small cones   Fwd x 4 laps  Adding in tap challenged with multiple attempts    Requires instructions and CS to complete   Solo   Dribbling ball fwd/back 1/4 track x 6  laps ea total t      Bounce pass fwd/back walking   1/4 track x 6 laps each       Self ball pass from cone to cone   Walking fwd, multiple attempts and demonstrations    Ball pass from cone to cone with PT  U/l hand fwd/bck walking 1/4 track x 3 laps each  Solo step ups attempted /challenge  SOLO  Dribbling ball fwd/back 1/4 track  X 6 laps       Bounce pass fwd/back walking 1/4 track x 6 laps ea.   Stairs         Boxing          Posture         STS    With UE x 10   W/o UE attempted but unable   With UE 2 x 10   Blazepods         Trunk rotation   Standing with use of clips   Red and yellow  Cross body rotation with varying heights  X 20 ea direction       Ther Ex        Bike        Treadmill  SOLO B/L UE for endurance 1.0mph  X5 mins x 2  SOLO B/L UE for endurance 1.2mph  X 10 min  Solo B/L UE for endurance 1.3mph     X10min   SOLO B/L UE for endurance 1.0mph  X5 mins x 2                                                    Ther Activity        Bed mobility                 Gait Training        Treadmill         Level surface         Modalities

## 2024-08-15 ENCOUNTER — EVALUATION (OUTPATIENT)
Dept: PHYSICAL THERAPY | Facility: CLINIC | Age: 84
End: 2024-08-15
Payer: COMMERCIAL

## 2024-08-15 DIAGNOSIS — R26.89 BALANCE DISORDER: Primary | ICD-10-CM

## 2024-08-15 DIAGNOSIS — G20.A1 PARKINSON'S DISEASE, UNSPECIFIED WHETHER DYSKINESIA PRESENT, UNSPECIFIED WHETHER MANIFESTATIONS FLUCTUATE: ICD-10-CM

## 2024-08-15 PROCEDURE — 97112 NEUROMUSCULAR REEDUCATION: CPT | Performed by: PHYSICAL THERAPIST

## 2024-08-15 PROCEDURE — 97110 THERAPEUTIC EXERCISES: CPT | Performed by: PHYSICAL THERAPIST

## 2024-08-15 NOTE — PROGRESS NOTES
Maintenance progress update     Today's date: 8/15/2024  Patient name: Robi Bello  : 1940  MRN: [id number]  Referring provider: Joaquim Dickerson MD  Dx:   Encounter Diagnosis     ICD-10-CM    1. Balance disorder  R26.89       2. Parkinson's disease, unspecified whether dyskinesia present, unspecified whether manifestations fluctuate  G20.A1           Start Time: 1403  Stop Time: 1445  Total time in clinic (min): 42 minutes      Assessment: Progress update performed today to assess patients progress towards maintenance goals. He has been able to maintain endurance and gait speed as well as 5x STS, challenged with TUG instructions today which limited his performance. He utilized SPC due to his decreased confidence with balance, how utilizes inappropriately. Discussed this with patient and wife. Attempted gait training with fair success and carryover due to impaired coordination and slow processing. Patient will continue to benefit form further training and performance of maintenance due to fall risk, cog impairment and slow processing causing decreased safety awareness, falls risk and inability to monitor and adjust for appropriate level of activity with therapeutic exercise.       Impairments: abnormal coordination, abnormal gait, abnormal movement, activity intolerance, impaired balance, impaired physical strength, lacks appropriate home exercise program, safety issue and poor posture   Barriers to therapy: Limited transportation, history of migraines and Meniere disease  Understanding of Dx/Px/POC: good   Prognosis: good    GOALS AS OF 2024  STG:  Patient will achieve gait speed of 1.0 m/s to demonstrate community ambulation within 4 weeks - NOT MET   Patient will be complaint with increasing activity level at home for improved endurance within 4 weeks - Partially MET     LTG  Patient will increased 6 MWT by 200ft or great within 8 weeks - NOT MET   Patient will improve  FGA by 4 points or  "greater demonstrating significant improvements in balance within 8 weeks - NOT MET     NEW GOALS AS O 07/15/2024: and to be continued   Patient will maintain endurance demonstrating 6 MWT between 600-700ft within 8 weeks - MET  Patient will maintain 5x STS between 13-15s within 8 weeks - MET   Patient will maintain TUG Without AD as 15s within 8 weeks - not met in progress     Plan  Patient would benefit from: skilled physical therapy  Referral necessary: No  Planned therapy interventions: home exercise program, graded motor, graded exercise, graded activity, gait training, flexibility, functional ROM exercises, community reintegration, ADL training, balance, motor coordination training, neuromuscular re-education, patient education, postural training, sensory integrative techniques, strengthening, stretching, therapeutic activities, therapeutic exercise, therapeutic training and transfer training  Frequency: 2x/week  Duration in weeks: 8  Plan of Care beginning date: 07/15/2024  Plan of Care expiration date: 09/09/2024  Treatment plan discussed with: patient and family    Subjective: Reports he is attempting some movement at home, has difficulty stepping over objects. August 2nd moving in assisted living. Notes he would like to take a break from PT  Goal: \"I don't know\"   Pain:  LBP, depends on how he is lying 4/10 at worst, current 2/10      Objective: See treatment diary below    Gait Assessment  04/25/24 05/21 05/23 07/15 08/15   6 Minute Walk Test (ft): 750 ft without RW   Post: HR 87 O2 99% 615ft w/SPC 700ft  w/o SPC    HR 76  O2 97%  615ft  W/SPC  RPE 5/10  HR 78  O2: 96%   600ft  W/SPC  RPE: 5/10  HR: 78  O2 98%     2 Minute Walk Test (ft):        Gait Speed (ft/s): 20 ft / 6 sec = 1.02 m/s  20ft/10s w/SPC    20ft/8.3s w/SPC = 2.4ft/s  0.73 m/s   20ft/7.3s w/o SPC=2.7ft/s  0.83 m/s 20ft/9.05s =2.2ft/s  W/SPC  .67 m/s  20ft/ 8.3 = 2.4ft/s W/o SPC   0.73 m/s   20ft/10.9s = 1.89ft/s  0.55 m/s  w/SPC   TUG TUG " Score: 23 sec (No AD)  Tug COG Score: 26 cog (No AD) 24s W/AD  22a no AD  17.7s W/AD  18.0s W/O AD  W/cog task: 20.8s with inconsistent count and passes target  20.6s W/AD  14.9s w/0 AD  19.9s w/o AD and cog task, unable to maintain cog task 19.72 w/AD  16 w/o AD     FGA (<22/30 increased fall risk) 19/30 16/30     Sit to Stand Assessment  5xSTS score (time): 40 sec no UE  W/UE 14s   14.5s w/UE 13.5s w/UE  Unable w/o UE wUE: 13.09s  W/o UE 19.3s   Balance:        Modified Clinical Test of Sensory Interaction on Balance   Firm surface:  EO 30 sec   EC 30 sec   FOAM surface  EO 30 sec   EC 11sec, 30 sec                 Plan of Care beginning date: 07/15/2024  Plan of Care expiration date: 09/09/2024    Visit count: 1 of 10; PN due 09/09/2024    POC expires Unit limit Auth Expiration date PT/OT/ST + Visit Limit?   05/20/2024 N/A N/A BOMN                           Visit/Unit Tracking  AUTH Status:  Date 03/25 03/28 04/02 04/04 04/09 04/11 04/16 04/18 04/25  PN 04/30 05/01 05/07 05/09   BOMN Used 1 2 3 4 5 6 7 8 9 1 2 3 4    Remaining  9 8 7 6 5 4 3 2 1 9 8 7 6     AUTH Status:  Date 05/14 05/16 05/21 05/23            BOMN Used 5 6 7 1             Remaining  5 4 3 9              AUTH Status:  Date 7/30 08/05 08/08 08/13 08/15           BOMN Used 6 7 8 9 1            Remaining  4 3 2 1 9                         PRECAUTIONS: PD, Fall Risk, Slow processing, Dementia, [place]    Manuals 7/30 08/05 08/08 08/13 08/15                                   Neuro Re-Ed         Hurdles  SOLO  6 inch   With uneven surface  Fwd x4 laps    Lat x 4 lap       HKM    Solo   1/4 track x 4 laps     With 5# DB   Arm swing x 4 laps     OH yellow tbar   With marching x 30     Dynamic balance  Obstacles with blaze pods   Navigating around and over   Tapping blaze pods at different heights x 5 attempts  Solo   1/4 track    Lateral walking x 6 laps    Stepping over small cones   Fwd x 4 laps  Adding in tap challenged with multiple attempts    Requires  instructions and CS to complete   Solo   Dribbling ball fwd/back 1/4 track x 6  laps ea total t      Bounce pass fwd/back walking   1/4 track x 6 laps each       Self ball pass from cone to cone   Walking fwd, multiple attempts and demonstrations    Ball pass from cone to cone with PT  U/l hand fwd/bck walking 1/4 track x 3 laps each  Solo step ups attempted /challenge     Stairs         Boxing          Posture         STS    With UE x 10   W/o UE attempted but unable      Blazepods         Trunk rotation   Standing with use of clips   Red and yellow  Cross body rotation with varying heights  X 20 ea direction       Ther Ex        Bike        Treadmill  SOLO B/L UE for endurance 1.0mph  X5 mins x 2  SOLO B/L UE for endurance 1.2mph  X 10 min  Solo B/L UE for endurance 1.3mph     X10min                                                      Ther Activity        Bed mobility                 Gait Training        Treadmill         Level surface      W/AD   SPC for pattern   With VC's and demos        Modalities

## 2024-08-19 ENCOUNTER — OFFICE VISIT (OUTPATIENT)
Dept: PHYSICAL THERAPY | Facility: CLINIC | Age: 84
End: 2024-08-19
Payer: COMMERCIAL

## 2024-08-19 ENCOUNTER — OFFICE VISIT (OUTPATIENT)
Dept: SPEECH THERAPY | Facility: CLINIC | Age: 84
End: 2024-08-19
Payer: COMMERCIAL

## 2024-08-19 DIAGNOSIS — R26.89 BALANCE DISORDER: Primary | ICD-10-CM

## 2024-08-19 DIAGNOSIS — F02.B0 MODERATE DEMENTIA DUE TO PARKINSON'S DISEASE, UNSPECIFIED WHETHER BEHAVIORAL, PSYCHOTIC, OR MOOD DISTURBANCE OR ANXIETY (HCC): ICD-10-CM

## 2024-08-19 DIAGNOSIS — G20.A1 PARKINSON'S DISEASE, UNSPECIFIED WHETHER DYSKINESIA PRESENT, UNSPECIFIED WHETHER MANIFESTATIONS FLUCTUATE: ICD-10-CM

## 2024-08-19 DIAGNOSIS — R48.8 OTHER SYMBOLIC DYSFUNCTIONS: Primary | ICD-10-CM

## 2024-08-19 DIAGNOSIS — G20.A1 MODERATE DEMENTIA DUE TO PARKINSON'S DISEASE, UNSPECIFIED WHETHER BEHAVIORAL, PSYCHOTIC, OR MOOD DISTURBANCE OR ANXIETY (HCC): ICD-10-CM

## 2024-08-19 PROCEDURE — 97110 THERAPEUTIC EXERCISES: CPT | Performed by: PHYSICAL THERAPIST

## 2024-08-19 PROCEDURE — 97112 NEUROMUSCULAR REEDUCATION: CPT | Performed by: PHYSICAL THERAPIST

## 2024-08-19 PROCEDURE — 92507 TX SP LANG VOICE COMM INDIV: CPT

## 2024-08-19 NOTE — PROGRESS NOTES
Daily Speech Treatment Note    Today's date: 2024   Patient’s name: Rboi Bello  : 1940  MRN: [id number]  Safety measures: PD, Fall Risk  Referring provider: Joaquim Dickerson MD    Encounter Diagnosis     ICD-10-CM    1. Other symbolic dysfunctions  R48.8       2. Moderate dementia due to Parkinson's disease, unspecified whether behavioral, psychotic, or mood disturbance or anxiety ([facility])  G20.A1     F02.B0         Visit Tracking:  Re-eval Due POC Expires Auth Expiration Date ST Visit Limit   2024 BOMN   2024 BOMN                     Visit/Unit Tracking  AUTH Status:  Date 2024      BOMN Used 15 16 17 18       Remaining  BOMN BOMN BOMN BOMN          Subjective/Behavioral:  -Patient appeared to be more engaged during session today compared to previous sessions!    Objective/Assessment:  -No homework was returned on this date of service.    Short Term Goals  1. Patient will complete word generation tasks (e.g., categorical naming, sentence/phrase completion, word deduction, definitions, etc.) at 75% accuracy with min semantic and phonemic cues from clinician as needed to target the maintenance of his/her expressive vocabulary, as well as promote socialization and safety. --ONGOING    To target word generation: Patient was presented with a clue (e.g. a [place]ropolis, a primary color, not quite warm, currency) and was instructed to answer using the last letter from the previous answer as the first letter of the next (e.g. city, yellow, warm, money). He completed this task in 10/20 (50%) opportunities independently, increasing to 20/20 (100%) opportunities given mod semantic cues.     To target word generation: Patient was presented with a category and a letter (e.g. body part starting with E) and was instructed to answer.  He completed this task in 16/20 (80%) opportunities independently, increasing to   (100%) opportunities given mod semantic cues.    2. Patient will complete cognitive-linguistic activities (e.g., verbal and visual problem solving scenarios, sequencing, reasoning, etc.) at 75% accuracy with min semantic cues from clinician as needed to target the maintenance of his/her cognitive-linguistic functioning, as well as promote safety and overall QOL. --ONGOING      Plan:  -Patient was provided with home exercises/activities to target goals in plan of care at the end of today's session.  -Continue with current plan of care.

## 2024-08-19 NOTE — PROGRESS NOTES
Daily Note     Today's date: 2024  Patient name: Robi Bello  : 1940  MRN: [id number]  Referring provider: Joaquim Dickerson MD  Dx:   Encounter Diagnosis     ICD-10-CM    1. Balance disorder  R26.89       2. Parkinson's disease, unspecified whether dyskinesia present, unspecified whether manifestations fluctuate  G20.A1           Start Time: 1505  Stop Time: 1545  Total time in clinic (min): 40 minutes    Subjective: received from speech today no issues reported pre pT     Objective: See treatment diary below      Assessment: Tolerated treatment well. Patient demonstrated fatigue post treatment and would benefit from continued PT  Appears fatigued at start of session and with increased confusion. Slow to process throughout session requires cues to attend to tasks and for proper technique especially with focus on posture.     Plan: Continue per plan of care.      Plan of Care beginning date: 07/15/2024  Plan of Care expiration date: 2024    Visit count: 1 of 10; PN due 2024    POC expires Unit limit Auth Expiration date PT/OT/ST + Visit Limit?   2024 N/A N/A BOMN                           Visit/Unit Tracking  AUTH Status:  Date 03/25 03/28 04/02 04/04 04/09 04/11 04/16 04/18 04/25  PN    BOMN Used 1 2 3 4 5 6 7 8 9 1 2 3 4    Remaining  9 8 7 6 5 4 3 2 1 9 8 7 6     AUTH Status:  Date 05/14 05/16 05/21 05/23            BOMN Used 5 6 7 1             Remaining  5 4 3 9              AUTH Status:  Date 7/30 08/05 08/08 08/13 08/15 08/19          BOMN Used 6 7 8 9 1 2           Remaining  4 3 2 1 9 8                        PRECAUTIONS: PD, Fall Risk, Slow processing, Dementia, [place]    Manuals 08/19  08/08 08/13 08/15                                   Neuro Re-Ed         Hurdles  Solo   Low (x5)  1/4 track   Fwd x 4 laps   No AD  Lat x 3 laps     Cues for technique and sequencing        HKM    Solo   1/4 track x 4 laps     With 5# DB   Arm swing x 4 laps     OH  yellow tbar   With marching x 30     Dynamic balance  Walking no AD x 150ft   Solo   Dribbling ball fwd/back 1/4 track x 6  laps ea total t      Bounce pass fwd/back walking   1/4 track x 6 laps each       Self ball pass from cone to cone   Walking fwd, multiple attempts and demonstrations    Ball pass from cone to cone with PT  U/l hand fwd/bck walking 1/4 track x 3 laps each  Solo step ups attempted /challenge     Stairs         Boxing          Posture         STS    With UE x 10   W/o UE attempted but unable      Blazepods         Trunk rotation         Ther Ex        Bike        Treadmill  Solo B/L UE 1.1 5.0% x 10 min   Solo B/L UE for endurance 1.3mph     X10min                                                      Ther Activity        Bed mobility                 Gait Training        Treadmill         Level surface      W/AD   SPC for pattern   With VC's and demos        Modalities

## 2024-08-22 ENCOUNTER — OFFICE VISIT (OUTPATIENT)
Dept: PHYSICAL THERAPY | Facility: CLINIC | Age: 84
End: 2024-08-22
Payer: COMMERCIAL

## 2024-08-22 DIAGNOSIS — R26.89 BALANCE DISORDER: Primary | ICD-10-CM

## 2024-08-22 DIAGNOSIS — G20.A1 PARKINSON'S DISEASE, UNSPECIFIED WHETHER DYSKINESIA PRESENT, UNSPECIFIED WHETHER MANIFESTATIONS FLUCTUATE: ICD-10-CM

## 2024-08-22 PROCEDURE — 97112 NEUROMUSCULAR REEDUCATION: CPT | Performed by: PHYSICAL THERAPIST

## 2024-08-22 PROCEDURE — 97110 THERAPEUTIC EXERCISES: CPT | Performed by: PHYSICAL THERAPIST

## 2024-08-22 NOTE — PROGRESS NOTES
Daily Note     Today's date: 2024  Patient name: Robi Bello  : 1940  MRN: [id number]  Referring provider: Joaquim Dickerson MD  Dx:   Encounter Diagnosis     ICD-10-CM    1. Balance disorder  R26.89       2. Parkinson's disease, unspecified whether dyskinesia present, unspecified whether manifestations fluctuate  G20.A1                      Subjective: reports increased pain on right side of stomach up to right arm. Yesterday, improved today.       Objective: See treatment diary below  BP post 5 min treadmill 160/80    Assessment: Tolerated treatment well. Patient demonstrated fatigue post treatment and would benefit from continued PT  No issues with blood pressure.    Plan: Continue per plan of care.      Plan of Care beginning date: 07/15/2024  Plan of Care expiration date: 2024    Visit count: 1 of 10; PN due 2024    POC expires Unit limit Auth Expiration date PT/OT/ST + Visit Limit?   2024 N/A N/A BOMN                           Visit/Unit Tracking  AUTH Status:  Date   PN    BOMN Used 1 2 3 4 5 6 7 8 9 1 2 3 4    Remaining  9 8 7 6 5 4 3 2 1 9 8 7 6     AUTH Status:  Date             BOMN Used 5 6 7 1             Remaining  5 4 3 9              AUTH Status:  Date 7/30 08/05 08/08 08/13 08/15 08/19 08/22         BOMN Used 6 7 8 9 1 2 3          Remaining  4 3 2 1 9 8 7                       PRECAUTIONS: PD, Fall Risk, Slow processing, Dementia, [place]    Manuals 08/19 08/22 08/08 08/13 08/15                                   Neuro Re-Ed         Hurdles  Solo   Low (x5)  1/4 track   Fwd x 4 laps   No AD  Lat x 3 laps     Cues for technique and sequencing  Solo   mid (x6)  B/L UE  15ft // bars   Fwd x 4 laps   Lat x 4 laps     Cues for technique and sequencing       HKM    Solo   1/4 track x 4 laps     With 5# DB   Arm swing x 4 laps     OH yellow tbar   With marching x 30     Dynamic  balance  Walking no AD x 150ft  Walking HT/HN  B/L UE  15ft 4 laps ea    Solo   Dribbling ball fwd/back 1/4 track x 6  laps ea total t      Bounce pass fwd/back walking   1/4 track x 6 laps each       Self ball pass from cone to cone   Walking fwd, multiple attempts and demonstrations    Ball pass from cone to cone with PT  U/l hand fwd/bck walking 1/4 track x 3 laps each  Solo step ups attempted /challenge     Stairs         Boxing          Posture         STS   20x no UE  With UE x 10   W/o UE attempted but unable      Blazepods         Trunk rotation         Ther Ex        Bike        Treadmill  Solo B/L UE 1.1 5.0% x 10 min  Solo B/L UE 1.1 5.0% x 10 min  Solo B/L UE for endurance 1.3mph     X10min                                                      Ther Activity        Bed mobility                 Gait Training        Treadmill         Level surface      W/AD   SPC for pattern   With VC's and demos        Modalities

## 2024-08-26 ENCOUNTER — TELEPHONE (OUTPATIENT)
Dept: SPEECH THERAPY | Facility: CLINIC | Age: 84
End: 2024-08-26

## 2024-08-26 ENCOUNTER — APPOINTMENT (OUTPATIENT)
Dept: PHYSICAL THERAPY | Facility: CLINIC | Age: 84
End: 2024-08-26
Payer: COMMERCIAL

## 2024-08-26 DIAGNOSIS — R26.89 BALANCE DISORDER: Primary | ICD-10-CM

## 2024-08-26 DIAGNOSIS — G20.A1 PARKINSON'S DISEASE, UNSPECIFIED WHETHER DYSKINESIA PRESENT, UNSPECIFIED WHETHER MANIFESTATIONS FLUCTUATE: ICD-10-CM

## 2024-08-26 NOTE — PROGRESS NOTES
Daily Note     Today's date: 2024  Patient name: Robi Bello  : 1940  MRN: [id number]  Referring provider: Joaquim Dickerson MD  Dx:   Encounter Diagnosis     ICD-10-CM    1. Balance disorder  R26.89       2. Parkinson's disease, unspecified whether dyskinesia present, unspecified whether manifestations fluctuate  G20.A1                      Subjective: ***      Objective: See treatment diary below      Assessment: Tolerated treatment {Tolerated treatment :[id number]}. Patient {assessment:[id number]}      Plan: {PLAN:1939301619}     Plan of Care beginning date: 07/15/2024  Plan of Care expiration date: 2024    Visit count:  10; PN due 2024    POC expires Unit limit Auth Expiration date PT/OT/ST + Visit Limit?   2024 N/A N/A BOMN                           Visit/Unit Tracking  AUTH Status:  Date 03/25 03/28 04/02 04/04 04/09 04/11 04/16 04/18 04/25  PN    BOMN Used 1 2 3 4 5 6 7 8 9 1 2 3 4    Remaining  9 8 7 6 5 4 3 2 1 9 8 7 6     AUTH Status:  Date 05/14 05/16 05/21 05/23            BOMN Used 5 6 7 1             Remaining  5 4 3 9              AUTH Status:  Date 7/30 08/05 08/08 08/13 08/15 08/19 08/22         BOMN Used 6 7 8 9 1 2 3          Remaining  4 3 2 1 9 8 7                       PRECAUTIONS: PD, Fall Risk, Slow processing, Dementia, [place]    Manuals 08/19 08/22 08/08 08/13 08/15                                   Neuro Re-Ed         Hurdles  Solo   Low (x5)  1/4 track   Fwd x 4 laps   No AD  Lat x 3 laps     Cues for technique and sequencing  Solo   mid (x6)  B/L UE  15ft // bars   Fwd x 4 laps   Lat x 4 laps     Cues for technique and sequencing       HKM    Solo   1/4 track x 4 laps     With 5# DB   Arm swing x 4 laps     OH yellow tbar   With marching x 30     Dynamic balance  Walking no AD x 150ft  Walking HT/HN  B/L UE  15ft 4 laps ea    Solo   Dribbling ball fwd/back  track x 6  laps ea total t      Bounce pass fwd/back walking     track x 6 laps each       Self ball pass from cone to cone   Walking fwd, multiple attempts and demonstrations    Ball pass from cone to cone with PT  U/l hand fwd/bck walking 1/4 track x 3 laps each  Solo step ups attempted /challenge     Stairs         Boxing          Posture         STS   20x no UE  With UE x 10   W/o UE attempted but unable      Blazepods         Trunk rotation         Ther Ex        Bike        Treadmill  Solo B/L UE 1.1 5.0% x 10 min  Solo B/L UE 1.1 5.0% x 10 min  Solo B/L UE for endurance 1.3mph     X10min                                                      Ther Activity        Bed mobility                 Gait Training        Treadmill         Level surface      W/AD   SPC for pattern   With VC's and demos        Modalities

## 2024-08-26 NOTE — TELEPHONE ENCOUNTER
Therapist attempted to contact Patient 2X, but phone line was busy both times.  Attempted cell number as well, but did not connect.

## 2024-08-27 ENCOUNTER — OFFICE VISIT (OUTPATIENT)
Dept: PHYSICAL THERAPY | Facility: CLINIC | Age: 84
End: 2024-08-27
Payer: COMMERCIAL

## 2024-08-27 DIAGNOSIS — R26.89 BALANCE DISORDER: Primary | ICD-10-CM

## 2024-08-27 DIAGNOSIS — G20.A1 PARKINSON'S DISEASE, UNSPECIFIED WHETHER DYSKINESIA PRESENT, UNSPECIFIED WHETHER MANIFESTATIONS FLUCTUATE: ICD-10-CM

## 2024-08-27 PROCEDURE — 97110 THERAPEUTIC EXERCISES: CPT | Performed by: PHYSICAL THERAPIST

## 2024-08-27 PROCEDURE — 97112 NEUROMUSCULAR REEDUCATION: CPT | Performed by: PHYSICAL THERAPIST

## 2024-08-27 NOTE — PROGRESS NOTES
Daily Note     Today's date: 2024  Patient name: Robi Bello  : 1940  MRN: [id number]  Referring provider: Joaquim Dickerson MD  Dx:   Encounter Diagnosis     ICD-10-CM    1. Balance disorder  R26.89       2. Parkinson's disease, unspecified whether dyskinesia present, unspecified whether manifestations fluctuate  G20.A1                      Subjective: patient wife reports they told  last week they had appointment conflict during appointment time yesterday however PT did not receive the message.    Objective: See treatment diary below      Assessment: Tolerated treatment well. Patient demonstrated fatigue post treatment and would benefit from continued PT   Patient arrived for an unscheudled appointment today at 2pm able to accommodate for 20 min. Patient requires consistent cues to maintain intensity level for endurance training. With slow processing during dual tasks requires consistent cues for technique and speed. Hesitant to release UE from support.     Plan: Continue per plan of care.      Plan of Care beginning date: 07/15/2024  Plan of Care expiration date: 2024    Visit count: 1 of 10; PN due 2024    POC expires Unit limit Auth Expiration date PT/OT/ST + Visit Limit?   2024 N/A N/A BOMN                           Visit/Unit Tracking  AUTH Status:  Date 03/25 03/28 04/02 04/04 04/09 04/11 04/16 04/18 04/25  PN    BOMN Used 1 2 3 4 5 6 7 8 9 1 2 3 4    Remaining  9 8 7 6 5 4 3 2 1 9 8 7 6     AUTH Status:  Date 05/14 05/16 05/21 05/23            BOMN Used 5 6 7 1             Remaining  5 4 3 9              AUTH Status:  Date 7/30 08/05 08/08 08/13 08/15 08/19 08/22 08/27        BOMN Used 6 7 8 9 1 2 3 4         Remaining  4 3 2 1 9 8 7 6                      PRECAUTIONS: PD, Fall Risk, Slow processing, Dementia, [place]    Manuals 08/19 08/22 08/27  08/15                                   Neuro Re-Ed         Hurdles  Solo   Low (x5)  1/4 track    Fwd x 4 laps   No AD  Lat x 3 laps     Cues for technique and sequencing  Solo   mid (x6)  B/L UE  15ft // bars   Fwd x 4 laps   Lat x 4 laps     Cues for technique and sequencing  15ft// bars   No UE   Fwd reciprocally   Attempted  X 4 laps     Lat x 4 laps        HKM        Dynamic balance  Walking no AD x 150ft  Walking HT/HN  B/L UE  15ft 4 laps ea    Walking HT/HN  No UE  15ft 4 laps ea      Stairs         Boxing          Posture         STS   20x no UE       Blazepods         Trunk rotation         Ther Ex        Bike   Nu step >70SPM attempts   X 10 min with consistent cueing     HR 67  O2 95%     Treadmill  Solo B/L UE 1.1 5.0% x 10 min  Solo B/L UE 1.1 5.0% x 10 min                                                       Ther Activity        Bed mobility                 Gait Training        Treadmill         Level surface      W/AD   SPC for pattern   With VC's and demos        Modalities

## 2024-08-29 ENCOUNTER — OFFICE VISIT (OUTPATIENT)
Dept: PHYSICAL THERAPY | Facility: CLINIC | Age: 84
End: 2024-08-29
Payer: COMMERCIAL

## 2024-08-29 DIAGNOSIS — R26.89 BALANCE DISORDER: Primary | ICD-10-CM

## 2024-08-29 DIAGNOSIS — G20.A1 PARKINSON'S DISEASE, UNSPECIFIED WHETHER DYSKINESIA PRESENT, UNSPECIFIED WHETHER MANIFESTATIONS FLUCTUATE: ICD-10-CM

## 2024-08-29 PROCEDURE — 97110 THERAPEUTIC EXERCISES: CPT | Performed by: PHYSICAL THERAPIST

## 2024-08-29 PROCEDURE — 97112 NEUROMUSCULAR REEDUCATION: CPT | Performed by: PHYSICAL THERAPIST

## 2024-08-29 NOTE — PROGRESS NOTES
Daily Note     Today's date: 2024  Patient name: Robi Bello  : 1940  MRN: [id number]  Referring provider: Joaquim Dickerson MD  Dx:   Encounter Diagnosis     ICD-10-CM    1. Balance disorder  R26.89       2. Parkinson's disease, unspecified whether dyskinesia present, unspecified whether manifestations fluctuate  G20.A1                      Subjective: NO change arrived with SPC today       Objective: See treatment diary below      Assessment: Tolerated treatment well. Patient demonstrated fatigue post treatment and would benefit from continued PT. With good stepping strategy fwd and backward with loss of balance on foam beam      Plan: Continue per plan of care.      Plan of Care beginning date: 07/15/2024  Plan of Care expiration date: 2024    Visit count: 1 of 10; PN due 2024    POC expires Unit limit Auth Expiration date PT/OT/ST + Visit Limit?   2024 N/A N/A BOMN                           Visit/Unit Tracking  AUTH Status:  Date 03/25 03/28 04/02 04/04 04/09 04/11 04/16 04/18 04/25  PN    BOMN Used 1 2 3 4 5 6 7 8 9 1 2 3 4    Remaining  9 8 7 6 5 4 3 2 1 9 8 7 6     AUTH Status:  Date 05/14 05/16 05/21 05/23            BOMN Used 5 6 7 1             Remaining  5 4 3 9              AUTH Status:  Date 7/30 08/05 08/08 08/13 08/15 08/19 08/22 08/27 08/20 08/29      BOMN Used 6 7 8 9 1 2 3 4 5 6       Remaining  4 3 2 1 9 8 7 6 5 4                    PRECAUTIONS: PD, Fall Risk, Slow processing, Dementia, [place]    Manuals 08/19 08/22 08/27 08/29 08/15                                   Neuro Re-Ed         Hurdles  Solo   Low (x5)  1/4 track   Fwd x 4 laps   No AD  Lat x 3 laps     Cues for technique and sequencing  Solo   mid (x6)  B/L UE  15ft // bars   Fwd x 4 laps   Lat x 4 laps     Cues for technique and sequencing  15ft// bars   No UE   Fwd reciprocally   Attempted  X 4 laps     Lat x 4 laps        HKM        Dynamic balance  Walking no AD x 150ft  Walking  HT/HN  B/L UE  15ft 4 laps ea    Walking HT/HN  No UE  15ft 4 laps ea  Solo   Fwd /back walking 10# TT 1/4 track x 4 laps  ea    Foam beams   Lateral stepping   With AD 2 laps each     Fwd walking NBOS x 2 laps     Stairs         Boxing          Posture         STS   20x no UE       Blazepods         Trunk rotation         Ther Ex        Bike   Nu step >70SPM attempts   X 10 min with consistent cueing     HR 67  O2 95%     Treadmill  Solo B/L UE 1.1 5.0% x 10 min  Solo B/L UE 1.1 5.0% x 10 min   1.2 mph 2.5% incline  B/L UE  HR  O2                                                     Ther Activity        Bed mobility                 Gait Training        Treadmill         Level surface      W/AD   SPC for pattern   With VC's and demos        Modalities

## 2024-09-05 ENCOUNTER — OFFICE VISIT (OUTPATIENT)
Dept: PHYSICAL THERAPY | Facility: CLINIC | Age: 84
End: 2024-09-05
Payer: COMMERCIAL

## 2024-09-05 DIAGNOSIS — G20.A1 PARKINSON'S DISEASE, UNSPECIFIED WHETHER DYSKINESIA PRESENT, UNSPECIFIED WHETHER MANIFESTATIONS FLUCTUATE: ICD-10-CM

## 2024-09-05 DIAGNOSIS — R26.89 BALANCE DISORDER: Primary | ICD-10-CM

## 2024-09-05 PROCEDURE — 97112 NEUROMUSCULAR REEDUCATION: CPT

## 2024-09-05 PROCEDURE — 97110 THERAPEUTIC EXERCISES: CPT

## 2024-09-05 NOTE — PROGRESS NOTES
Daily Note     Today's date: 2024  Patient name: Robi Bello  : 1940  MRN: [id number]  Referring provider: Joaquim Dickerson MD  Dx:   Encounter Diagnosis     ICD-10-CM    1. Balance disorder  R26.89       2. Parkinson's disease, unspecified whether dyskinesia present, unspecified whether manifestations fluctuate  G20.A1           Start Time: 1145  Stop Time: 1230  Total time in clinic (min): 45 minutes    Subjective: pt reports hs is doing well today with no significant change since previous visit      Objective: See treatment diary below      Assessment: Tolerated treatment well. Patient would benefit from continued PT pt demonstrated dec step length in the posterior direction that he is able to correct with verbal cues for approx 1-2 steps before retuning to previous form. Initial speed 1.2 mph however pt reports he feels this is too fast today slowed to 1.1 mph with reports of appropriate speed. Pt initially demonstrated a bout of festination when approaching hurdles howevr after initial bout, pt was able to clear ll hurdles w/o festinating or freezing. Pt unable to stand from chair w/o use of UE in todays session      Plan: Continue per plan of care.      Plan of Care beginning date: 07/15/2024  Plan of Care expiration date: 2024    Visit count: 1 of 10; PN due 2024    POC expires Unit limit Auth Expiration date PT/OT/ST + Visit Limit?   2024 N/A N/A BOMN                           Visit/Unit Tracking  AUTH Status:  Date 03/25 03/28 04/02 04/04 04/09 04/11 04/16 04/18 04/25  PN    BOMN Used 1 2 3 4 5 6 7 8 9 1 2 3 4    Remaining  9 8 7 6 5 4 3 2 1 9 8 7 6     AUTH Status:  Date 05/14 05/16 05/21 05/23            BOMN Used 5 6 7 1             Remaining  5 4 3 9              AUTH Status:  Date 7/30 08/05 08/08 08/13 08/15 08/19 08/22 08/27 08/20 08/29 9/     BOMN Used 6 7 8 9 1 2 3 4 5 6 7      Remaining  4 3 2 1 9 8 7 6 5 4 3                    PRECAUTIONS: PD, Fall Risk, Slow processing, Dementia, [place]    Manuals 08/19 08/22 08/27 08/29 9/5                                   Neuro Re-Ed         Hurdles  Solo   Low (x5)  1/4 track   Fwd x 4 laps   No AD  Lat x 3 laps     Cues for technique and sequencing  Solo   mid (x6)  B/L UE  15ft // bars   Fwd x 4 laps   Lat x 4 laps     Cues for technique and sequencing  15ft// bars   No UE   Fwd reciprocally   Attempted  X 4 laps     Lat x 4 laps        HKM        Dynamic balance  Walking no AD x 150ft  Walking HT/HN  B/L UE  15ft 4 laps ea    Walking HT/HN  No UE  15ft 4 laps ea  Solo   Fwd /back walking 10# TT 1/4 track x 4 laps  ea    Foam beams   Lateral stepping   With AD 2 laps each     Fwd walking NBOS x 2 laps  Solo   Fwd /back walking 10# TT 1/4 track x 3 laps  ea    Walking HT/HN  No UE  15ft 4 laps ea    hurdles     SOLO    6'' (6)  X3 laps fwd   Stairs         Boxing          Posture         STS   20x no UE    X20 UE use required    Blazepods         Trunk rotation         Ther Ex        Bike   Nu step >70SPM attempts   X 10 min with consistent cueing     HR 67  O2 95%     Treadmill  Solo B/L UE 1.1 5.0% x 10 min  Solo B/L UE 1.1 5.0% x 10 min   1.2 mph 2.5% incline  B/L UE  HR  O2  1.1 mph    BL UE use  [id number]%  HR 75 bpm                                                   Ther Activity        Bed mobility                 Gait Training        Treadmill         Level surface         Modalities

## 2024-09-09 ENCOUNTER — APPOINTMENT (OUTPATIENT)
Dept: SPEECH THERAPY | Facility: CLINIC | Age: 84
End: 2024-09-09
Payer: COMMERCIAL

## 2024-09-10 ENCOUNTER — EVALUATION (OUTPATIENT)
Dept: PHYSICAL THERAPY | Facility: CLINIC | Age: 84
End: 2024-09-10
Payer: COMMERCIAL

## 2024-09-10 ENCOUNTER — OFFICE VISIT (OUTPATIENT)
Dept: SPEECH THERAPY | Facility: CLINIC | Age: 84
End: 2024-09-10
Payer: COMMERCIAL

## 2024-09-10 DIAGNOSIS — G20.A1 PARKINSON'S DISEASE, UNSPECIFIED WHETHER DYSKINESIA PRESENT, UNSPECIFIED WHETHER MANIFESTATIONS FLUCTUATE: ICD-10-CM

## 2024-09-10 DIAGNOSIS — G20.A1 MODERATE DEMENTIA DUE TO PARKINSON'S DISEASE, UNSPECIFIED WHETHER BEHAVIORAL, PSYCHOTIC, OR MOOD DISTURBANCE OR ANXIETY (HCC): ICD-10-CM

## 2024-09-10 DIAGNOSIS — R26.89 BALANCE DISORDER: Primary | ICD-10-CM

## 2024-09-10 DIAGNOSIS — R48.8 OTHER SYMBOLIC DYSFUNCTIONS: Primary | ICD-10-CM

## 2024-09-10 DIAGNOSIS — F02.B0 MODERATE DEMENTIA DUE TO PARKINSON'S DISEASE, UNSPECIFIED WHETHER BEHAVIORAL, PSYCHOTIC, OR MOOD DISTURBANCE OR ANXIETY (HCC): ICD-10-CM

## 2024-09-10 PROCEDURE — 97112 NEUROMUSCULAR REEDUCATION: CPT | Performed by: PHYSICAL THERAPIST

## 2024-09-10 PROCEDURE — 92507 TX SP LANG VOICE COMM INDIV: CPT

## 2024-09-10 PROCEDURE — 92523 SPEECH SOUND LANG COMPREHEN: CPT

## 2024-09-10 NOTE — LETTER
2024    Joaquim Dickerson MD  [address] [place] [id number]    Patient: Robi Bello   YOB: 1940   Date of Visit: 9/10/2024     Encounter Diagnosis     ICD-10-CM    1. Other symbolic dysfunctions  R48.8       2. Moderate dementia due to Parkinson's disease, unspecified whether behavioral, psychotic, or mood disturbance or anxiety (HCC)  G20.A1     F02.B0           Dear Dr. Dickerson:    Thank you for your recent referral of Robi Bello. Please review the attached evaluation summary from Robi's recent visit.     Please verify that you agree with the plan of care by signing the attached order.     If you have any questions or concerns, please do not hesitate to call.     I sincerely appreciate the opportunity to share in the care of one of your patients and hope to have another opportunity to work with you in the near future.     Sincerely,    Nori Johnson, SLP      Referring Provider:     Based upon review of the patient's progress and continued therapy plan, it is my medical opinion that Robi Bello should continue speech therapy treatment at the Physical Therapy at [address]:                    Joaquim Dickerson MD  [address] [place] [id number]  Via Fax: [phone]        Speech-Language Pathology Re-Evaluation    Today's date: 9/10/2024   Patient’s name: Robi Bello  : 1940  MRN: [id number]  Safety measures: PD, Fall Risk  Referring provider: oJaquim Dickerson MD    Encounter Diagnosis     ICD-10-CM    1. Other symbolic dysfunctions  R48.8       2. Moderate dementia due to Parkinson's disease, unspecified whether behavioral, psychotic, or mood disturbance or anxiety (HCC)  G20.A1     F02.B0           Assessment:   -Patient continues to present with moderate to severe (but stable) cognitive-linguistic deficits characterized by decreased auditory immediate memory, auditory and visual attention, and word finding secondary to  Dementia from PD.  During testing today, Patient demonstrated strengths with personal information (e.g. demographics), spatial orientation, and confrontational naming.  Patient demonstrated weaknesses with temporal orientation, auditory attention (e.g. letter list, mental control, digits forward and backward), executive functions (e.g. trail making, math), and immediate/delayed auditory memory.  Based on testing results today and nature of Patient's diagnosis, it is recommended that he continue with a maintenance program.  Patient will be seen 1X a week to target the goals mentioned below to maintain his current level of functioning and attempt to prevent rapid regression.        Short Term Goals  1. Patient will complete word generation tasks (e.g., categorical naming, sentence/phrase completion, word deduction, definitions, etc.) at 75% accuracy with min semantic and phonemic cues from clinician as needed to target the maintenance of his/her expressive vocabulary, as well as promote socialization and safety. --ONGOING    2. Patient will complete cognitive-linguistic activities (e.g., verbal and visual problem solving scenarios, sequencing, reasoning, etc.) at 75% accuracy with min semantic cues from clinician as needed to target the maintenance of his/her cognitive-linguistic functioning, as well as promote safety and overall QOL. --ONGOING    Long Term Goals  1. Patient will maintain his/her highest level of independence with her current cognitive-linguistic functioning to meet her needs with the implementation of compensatory strategies to promote positive communication interactions and socialization, participation in meaningful activities, safety, and quality of life (to be achieved by discharge). --ONGOING    2. Patient will complete cognitive-linguistic therapy that addresses patient’s specific deficits in processing speed, short-term working memory, attention to detail, monitoring, sequencing, and  "organization skills, with instruction, to alleviate effects of executive functioning disorder deficits by discharge. --ONGOING      Plan:  Patient would benefit from outpatient skilled Speech Therapy services: Cognitive-linguistic therapy    Frequency: 1x weekly  Duration:  8-10 Weeks    Intervention certification from: 9/10/2024  Intervention certification to: 11/19/2024      Subjective:  -Patient was engaged in activities today.     Patient's goal(s): \"I don't know.\"      Objective (testing):  The Brief Cognitive Assessment Test (BCAT) is a multi-domain cognitive tool that assesses a patient’s orientation, verbal recall, visual recognition, visual recall, attention, abstraction, language, executive functions, and visuospatial processing. BCAT is sensitive to the full spectrum of cognitive functioning (normal, MCI, mild dementia, moderate-severe dementia) and can predict basic and instrumental activities of daily living (ADL, IADL).     During today’s administration of the test, patient achieved a total score of 16/50 points.     Using the BCAT Crosswalk Table as a reference, patient’s score falls within the range and may be suggestive of \"Moderate to Severe Dementia\". The Crosswalk Table suggests the following:      Cognitive Stage: Moderate to Severe Dementia (BCAT Range: 0-25 // MMSE: 0-18 // GDS: 5-7)  Cognitive & Functional Issues: Moderate (upper end of range) - Pervasive functional deficits (IADLs), but ADLs generally intact; marked deficits in memory and executive functions; behavioral and psychological symptoms are common; requires significant residential support. // Severe (lower end of range) - Needs assistance in ADLs/IADLs; pervasive cognitive deficits; requires complex care.      Treatment:  -To target reading comprehension and following directions: Patient read statements (e.g. cross out the seven items with legs) and looked at an array of 20 words to complete task.  He completed this task in 14/20 " (70%) opportunities independently, increasing to 20/20 (100%) opportunities given mod verbal and visual cues.      Visit Tracking:  Re-eval Due Auth Expiration Date ST Visit Limit   06/11/2024 12/31/2024 BOMN   09/03/2024 12/31/2024 BOMN   11/19/2024 12/31/2024 BOMN             Visit/Unit Tracking  AUTH Status:  Date 07/30/2024 08/05/2024 08/12/2024 08/19/2024 09/10/2024     BOMN Used 15 16 17 18 19      Remaining  BOMN BOMN BOMN BOMN BOMN         Intervention comments:  30 minutes test administration; 15 minutes therapy activities

## 2024-09-10 NOTE — PROGRESS NOTES
Maintenance progress update     Today's date: 9/10/2024  Patient name: Robi Bello  : 1940  MRN: [id number]  Referring provider: Joaquim Dickerson MD  Dx:   Encounter Diagnosis     ICD-10-CM    1. Balance disorder  R26.89       2. Parkinson's disease, unspecified whether dyskinesia present, unspecified whether manifestations fluctuate  G20.A1           Start Time: 0930  Stop Time: 1015  Total time in clinic (min): 45 minutes      Assessment: Progress update performed today to assess patients progress towards maintenance goals. He demonstrates a 100ft improvement in 6MWT without jessica of AD today possibly due to increased walking required I new living situation. Slight improvement in time with STS without use of hands. Overall demonstrates good endurance and strength, but remains with cognitive impairments, only able to follow simple 1 step instructions and commands. With tendency to veer left, stoop and festinate with fatigue during endurance testing, requires coaching from PT for safety, to stop and reset several times. With episodes of freezing with familiar tasks today requires cues to break freezing pattern as patient challenged to self cue. Continues to present as a fall risk per FGA, most challenged with dual tasks. Patient will continue to benefit from maintenance program to continue to slow the progression, due to progressive nature of PD along with cognitive declined and slow processing putting him at increased risk for falls with increased intensity exercise program needed for neuro re-education.       Impairments: abnormal coordination, abnormal gait, abnormal movement, activity intolerance, impaired balance, impaired physical strength, lacks appropriate home exercise program, safety issue and poor posture   Barriers to therapy: Limited transportation, history of migraines and Meniere disease  Understanding of Dx/Px/POC: good   Prognosis: good         NEW GOALS AS Of 07/15/2024: and to be  "continued   Patient will maintain endurance demonstrating 6 MWT between 600-700ft within 8 weeks - MET  Patient will maintain 5x STS between 13-15s within 8 weeks - MET   Patient will maintain TUG Without AD as 15s within 8 weeks - not met in progress     NEW Goals as of 09/10  Patient will maintain endurance demonstrating 6 MWT between 600-700ft within 8 weeks -   Patient will maintain 5x STS between 13-15s within 8 weeks -  Patient will maintain TUG Without AD as 16-17s within 8 weeks -     Plan  Patient would benefit from: skilled physical therapy  Referral necessary: No  Planned therapy interventions: home exercise program, graded motor, graded exercise, graded activity, gait training, flexibility, functional ROM exercises, community reintegration, ADL training, balance, motor coordination training, neuromuscular re-education, patient education, postural training, sensory integrative techniques, strengthening, stretching, therapeutic activities, therapeutic exercise, therapeutic training and transfer training  Frequency: 2x/week  Duration in weeks: 8  Plan of Care beginning date: 09/10/2024  Plan of Care expiration date: 0  Treatment plan discussed with: patient and family    Subjective: some increased movement at home as he has to walk to the cafeteria for meals now.   Goal: \"I don't know\"   Pain:  LBP, depends on how he is lying 4/10 at worst, current 2/10      Objective: See treatment diary below    Gait Assessment  05/23 07/15 08/15 09/09   6 Minute Walk Test (ft): 700ft  w/o SPC    HR 76  O2 97%  615ft  W/SPC  RPE 5/10  HR 78  O2: 96%   600ft  W/SPC  RPE: 5/10  HR: 78  O2 98%   700ft  W/O SPC  RPE 6/10  HR 87 BPM  02 98%     2 standing breaks to re-set   2 Minute Walk Test (ft):       Gait Speed (ft/s):  20ft/8.3s w/SPC = 2.4ft/s  0.73 m/s   20ft/7.3s w/o SPC=2.7ft/s  0.83 m/s 20ft/9.05s =2.2ft/s  W/SPC  .67 m/s  20ft/ 8.3 = 2.4ft/s W/o SPC   0.73 m/s   20ft/10.9s = 1.89ft/s  0.55 m/s  w/SPC 20ft/7.25s = " 2.7ft/s  0.84m/s  W/o SPC   TUG 17.7s W/AD  18.0s W/O AD  W/cog task: 20.8s with inconsistent count and passes target  20.6s W/AD  14.9s w/0 AD  19.9s w/o AD and cog task, unable to maintain cog task 19.72 w/AD  16 w/o AD   W/o AD: 17.06s   FGA (<22/30 increased fall risk) 16/30 12/6   Sit to Stand Assessment  5xSTS score (time): 14.5s w/UE 13.5s w/UE  Unable w/o UE wUE: 13.09s  W/o UE 19.3s No UE:   Trial 1 ; confusion during testing    Trial 2: 17.4s       Balance:       Modified Clinical Test of Sensory Interaction on Balance                  Plan of Care beginning date: 10/08/2024  Plan of Care expiration date: 11/05/2024    Visit count: 1 of 10; PN due 10/08/2024  POC expires Unit limit Auth Expiration date PT/OT/ST + Visit Limit?   05/20/2024 N/A N/A BOMN                           Visit/Unit Tracking  AUTH Status:  Date 03/25 03/28 04/02 04/04 04/09 04/11 04/16 04/18 04/25  PN 04/30 05/01 05/07 05/09   BOMN Used 1 2 3 4 5 6 7 8 9 1 2 3 4    Remaining  9 8 7 6 5 4 3 2 1 9 8 7 6     AUTH Status:  Date 05/14 05/16 05/21 05/23            BOMN Used 5 6 7 1             Remaining  5 4 3 9              AUTH Status:  Date 7/30 08/05 08/08 08/13 08/15 08/19 08/22 08/27 08/20 08/29 9/5 09/10    BOMN Used 6 7 8 9 1 2 3 4 5 6 7 8     Remaining  4 3 2 1 9 8 7 6 5 4 3 2              PRECAUTIONS: PD, Fall Risk, Slow processing, Dementia, [place]    Manuals 09/10  08/08 08/13 08/15                                   Neuro Re-Ed         Hurdles  6inc 10ft // bars B/L UE   With external cues for foot placement     Non reciprocally fwd x 4 lap  Reciprocally fwd x 2 laps        HKM    Solo   1/4 track x 4 laps     With 5# DB   Arm swing x 4 laps     OH yellow tbar   With marching x 30     Dynamic balance    Solo   Dribbling ball fwd/back 1/4 track x 6  laps ea total t      Bounce pass fwd/back walking   1/4 track x 6 laps each       Self ball pass from cone to cone   Walking fwd, multiple attempts and demonstrations    Ball pass from  cone to cone with PT  U/l hand fwd/bck walking 1/4 track x 3 laps each  Solo step ups attempted /challenge     Stairs         Boxing          Posture         STS    With UE x 10   W/o UE attempted but unable      Blazepods         Trunk rotation         Ther Ex        Bike        Treadmill    Solo B/L UE for endurance 1.3mph     X10min                                                      Ther Activity        Bed mobility                 Gait Training        Treadmill         Level surface      W/AD   SPC for pattern   With VC's and demos        Modalities

## 2024-09-10 NOTE — PROGRESS NOTES
Speech-Language Pathology Re-Evaluation    Today's date: 9/10/2024   Patient’s name: Robi Bello  : 1940  MRN: [id number]  Safety measures: PD, Fall Risk  Referring provider: Joaquim Dickerson MD    Encounter Diagnosis     ICD-10-CM    1. Other symbolic dysfunctions  R48.8       2. Moderate dementia due to Parkinson's disease, unspecified whether behavioral, psychotic, or mood disturbance or anxiety ([facility])  G20.A1     F02.B0           Assessment:   -Patient continues to present with moderate to severe (but stable) cognitive-linguistic deficits characterized by decreased auditory immediate memory, auditory and visual attention, and word finding secondary to Dementia from PD.  During testing today, Patient demonstrated strengths with personal information (e.g. demographics), spatial orientation, and confrontational naming.  Patient demonstrated weaknesses with temporal orientation, auditory attention (e.g. letter list, mental control, digits forward and backward), executive functions (e.g. trail making, math), and immediate/delayed auditory memory.  Based on testing results today and nature of Patient's diagnosis, it is recommended that he continue with a maintenance program.  Patient will be seen 1X a week to target the goals mentioned below to maintain his current level of functioning and attempt to prevent rapid regression.        Short Term Goals  1. Patient will complete word generation tasks (e.g., categorical naming, sentence/phrase completion, word deduction, definitions, etc.) at 75% accuracy with min semantic and phonemic cues from clinician as needed to target the maintenance of his/her expressive vocabulary, as well as promote socialization and safety. --ONGOING    2. Patient will complete cognitive-linguistic activities (e.g., verbal and visual problem solving scenarios, sequencing, reasoning, etc.) at 75% accuracy with min semantic cues from clinician as needed to target the maintenance of  "his/her cognitive-linguistic functioning, as well as promote safety and overall QOL. --ONGOING    Long Term Goals  1. Patient will maintain his/her highest level of independence with her current cognitive-linguistic functioning to meet her needs with the implementation of compensatory strategies to promote positive communication interactions and socialization, participation in meaningful activities, safety, and quality of life (to be achieved by discharge). --ONGOING    2. Patient will complete cognitive-linguistic therapy that addresses patient’s specific deficits in processing speed, short-term working memory, attention to detail, monitoring, sequencing, and organization skills, with instruction, to alleviate effects of executive functioning disorder deficits by discharge. --ONGOING      Plan:  Patient would benefit from outpatient skilled Speech Therapy services: Cognitive-linguistic therapy    Frequency: 1x weekly  Duration:  8-10 Weeks    Intervention certification from: 9/10/2024  Intervention certification to: 11/19/2024      Subjective:  -Patient was engaged in activities today.     Patient's goal(s): \"I don't know.\"      Objective (testing):  The Brief Cognitive Assessment Test (BCAT) is a multi-domain cognitive tool that assesses a patient’s orientation, verbal recall, visual recognition, visual recall, attention, abstraction, language, executive functions, and visuospatial processing. BCAT is sensitive to the full spectrum of cognitive functioning (normal, MCI, mild dementia, moderate-severe dementia) and can predict basic and instrumental activities of daily living (ADL, IADL).     During today’s administration of the test, patient achieved a total score of 16/50 points.     Using the BCAT Crosswalk Table as a reference, patient’s score falls within the range and may be suggestive of \"Moderate to Severe Dementia\". The Crosswalk Table suggests the following:      Cognitive Stage: Moderate to Severe " Dementia (BCAT Range: 0-25 // MMSE: 0-18 // GDS: 5-7)  Cognitive & Functional Issues: Moderate (upper end of range) - Pervasive functional deficits (IADLs), but ADLs generally intact; marked deficits in memory and executive functions; behavioral and psychological symptoms are common; requires significant residential support. // Severe (lower end of range) - Needs assistance in ADLs/IADLs; pervasive cognitive deficits; requires complex care.      Treatment:  -To target reading comprehension and following directions: Patient read statements (e.g. cross out the seven items with legs) and looked at an array of 20 words to complete task.  He completed this task in 14/20 (70%) opportunities independently, increasing to 20/20 (100%) opportunities given mod verbal and visual cues.      Visit Tracking:  Re-eval Due Auth Expiration Date ST Visit Limit   06/11/2024 12/31/2024 BOMN   09/03/2024 12/31/2024 BOMN   11/19/2024 12/31/2024 BOMN             Visit/Unit Tracking  AUTH Status:  Date 07/30/2024 08/05/2024 08/12/2024 08/19/2024 09/10/2024     BOMN Used 15 16 17 18 19      Remaining  BOMN BOMN BOMN BOMN BOMN         Intervention comments:  30 minutes test administration; 15 minutes therapy activities

## 2024-09-18 ENCOUNTER — APPOINTMENT (OUTPATIENT)
Dept: SPEECH THERAPY | Facility: CLINIC | Age: 84
End: 2024-09-18
Payer: COMMERCIAL

## 2024-09-18 ENCOUNTER — APPOINTMENT (OUTPATIENT)
Dept: PHYSICAL THERAPY | Facility: CLINIC | Age: 84
End: 2024-09-18
Payer: COMMERCIAL

## 2024-09-24 ENCOUNTER — APPOINTMENT (OUTPATIENT)
Dept: SPEECH THERAPY | Facility: CLINIC | Age: 84
End: 2024-09-24
Payer: COMMERCIAL

## 2024-09-24 ENCOUNTER — APPOINTMENT (OUTPATIENT)
Dept: PHYSICAL THERAPY | Facility: CLINIC | Age: 84
End: 2024-09-24
Payer: COMMERCIAL

## 2024-10-14 ENCOUNTER — TELEPHONE (OUTPATIENT)
Dept: PHYSICAL THERAPY | Facility: CLINIC | Age: 84
End: 2024-10-14

## 2024-10-14 NOTE — TELEPHONE ENCOUNTER
Busy signal will attempt cell phone number listed.     Left a message on mobile number requesting a call back to discuss patient plan for therapy as we have not seen him in a few weeks after illness. Requested a phone call back at [phone].